# Patient Record
Sex: MALE | Race: WHITE | NOT HISPANIC OR LATINO | Employment: UNEMPLOYED | RURAL
[De-identification: names, ages, dates, MRNs, and addresses within clinical notes are randomized per-mention and may not be internally consistent; named-entity substitution may affect disease eponyms.]

---

## 2017-02-24 ENCOUNTER — TRANSCRIBE ORDERS (OUTPATIENT)
Dept: PHYSICAL THERAPY | Facility: HOSPITAL | Age: 72
End: 2017-02-24

## 2017-02-24 DIAGNOSIS — R27.0 ATAXIA: Primary | ICD-10-CM

## 2017-03-06 ENCOUNTER — HOSPITAL ENCOUNTER (OUTPATIENT)
Dept: PHYSICAL THERAPY | Facility: HOSPITAL | Age: 72
Setting detail: THERAPIES SERIES
Discharge: HOME | End: 2017-03-06

## 2017-03-06 DIAGNOSIS — R27.0 ATAXIA: Primary | ICD-10-CM

## 2017-03-06 PROCEDURE — G8978 MOBILITY CURRENT STATUS: HCPCS | Performed by: PHYSICAL THERAPIST

## 2017-03-06 PROCEDURE — 97162 PT EVAL MOD COMPLEX 30 MIN: CPT | Performed by: PHYSICAL THERAPIST

## 2017-03-06 PROCEDURE — 97110 THERAPEUTIC EXERCISES: CPT | Performed by: PHYSICAL THERAPIST

## 2017-03-06 PROCEDURE — G8979 MOBILITY GOAL STATUS: HCPCS | Performed by: PHYSICAL THERAPIST

## 2017-03-07 NOTE — PROGRESS NOTES
Outpatient Physical Therapy Ortho Initial Evaluation  [facility]  Marylou Malcolm, PT  17       Patient Name: Keshav Elias  : 1945  MRN: [id number]  Today's Date: 3/6/2017      Visit Date: 2017     Subjective Improvement: N/A       Attendance:   Approved: Medicare Guidlines MD follow up: Ask next visit          RC date: 3/27/17           There is no problem list on file for this patient.       Past Medical History   Diagnosis Date   • Benign tumor      Bottom R foot   • Carpal tunnel syndrome      Left   • Cataract    • Sleep apnea         Past Surgical History   Procedure Laterality Date   • Tonsillectomy     • Gastric bypass     • Coronary artery bypass graft     • Vena cava filter insertion     • Joint replacement       Knee   • Hernia repair     • Angioplasty     • Cardiac surgery       Stent placement x2   • Back surgery  2016     radical facetectomy L5-S1     Medications:  Famotidine tab  Ferrous Gluconate  Coumadin  Vitamin B12  Multi-vitamin  Prozac/Fluoxetine  Metoprolol/Toprol XL  Plavix  Calcium 600 +D  Crestor      Visit Dx:     ICD-10-CM ICD-9-CM   1. Ataxia R27.0 781.3             Patient History       17 1400          History    Chief Complaint Difficulty Walking;Balance Problems  -KG      Date Current Problem(s) Began --   Last spring; getting worse over time  -KG      Brief Description of Current Complaint Pt presents with c/o decrased balance. Pt states he also has back pain which he has had for many years. Pt states he gets lightheaded at times. Reports his lightheadedness comes and goes. States he has had vertigo before & this isn't it. Reports his main issue is his balance. States he walks with his hands on walls/furniture to keep balance. States he doesn't use an AD. Reports he has 3 steps to get into the house with no handrails; 11 steps leading downstairs in his home with a handrail. States he has fallen 3 times in the past  year due to his decreased balance.  -KG      Occupation/sports/leisure activities Pt retired. Enjoys fishing, woodworking/carving, painting  -KG      Pain     Pain Location Back  -KG      Pain at Present 4  -KG      Fall Risk Assessment    Any falls in the past year: Yes  -KG      Number of falls reported in the last 12 months 3  -KG      Factors that contributed to the fall: Lost balance   Dizziness  -KG        User Key  (r) = Recorded By, (t) = Taken By, (c) = Cosigned By    Initials Name Provider Type    KG Marylou Malcolm, PT Physical Therapist                PT Ortho       03/06/17 1400    Subjective Comments    Subjective Comments Pt states decreased balance is his main issue and it's not caused by dizziness/lightheadedness  -KG    Subjective Pain    Able to rate subjective pain? yes  -KG    Pre-Treatment Pain Level 4  -KG    Post-Treatment Pain Level 4  -KG    Posture/Observations    Posture- WNL Posture is WNL  -KG    Posture/Observations Comments Pt shows no signs of acute distress. Ambulates with a mild antalgic gait demonstrating decreased step length bilaterally, decreased heel strike. Pt holds onto the wall walking to the treatment room. Slow/guarded with sit<>stand transfers due to pain in low back. Decreased balance noted during turns with ambulation  -KG    Sensation    Sensation WNL? WNL  -KG    Light Touch No apparent deficits  -KG    ROM (Range of Motion)    General ROM no range of motion deficits identified  -KG    MMT (Manual Muscle Testing)    General MMT Assessment lower extremity strength deficits identified  -KG    General MMT Assessment Detail R side weaker than L, hip flex 4/8, knee flex 3+/5, ext 3+/5  -KG    Left Hip    Hip Flexion Gross Movement (4/5) good  -KG    Hip ABduction Gross Movement (4/5) good  -KG    Hip ADduction Gross Movement (4/5) good  -KG    Right Hip    Hip Flexion Gross Movement (4-/5) good minus  -KG    Hip ABduction Gross Movement (4/5) good  -KG    Hip ADduction  "Gross Movement (4/5) good  -KG    Lower Extremity    Lower Ext Manual Muscle Testing left hip strength deficit;right hip strength deficit;left knee strength deficit;right knee strength deficit;left ankle strength deficit;right ankle strength deficit  -KG    Left Knee    Knee Extension Gross Movement (3+/5) fair plus  -KG    Knee Flexion Gross Movement (3+/5) fair plus  -KG    Right Knee    Knee Extension Gross Movement (4+/5) good plus  -KG    Knee Flexion Gross Movement (4+/5) good plus  -KG    Left Ankle/Foot    Ankle PF Gross Movement (4+/5) good plus  -KG    Ankle Dorsiflexion Gross Movement (4+/5) good plus  -KG    Right Ankle/Foot    Ankle PF Gross Movement (4/5) good  -KG    Ankle Dorsiflexion Gross Movement (4/5) good  -KG    Balance Skills Training    Training Strategies (Balance Skills) EO/FA 45\", EO/FT 45\" with increased sway vs FA, Tandem R lead 17\", Tandem L lead 22\", EC/FA 45\", EC/FT 5\" & 20\"  -KG    Gait Assessment/Treatment    Gait, Comment TUG: 15.7\", 16.2\"  -KG      User Key  (r) = Recorded By, (t) = Taken By, (c) = Cosigned By    Initials Name Provider Type    RAFA Malcolm, PT Physical Therapist                            Therapy Education       03/06/17 1818          Therapy Education    Given HEP;Symptoms/condition management   HEP: EC & EO/FT, modified tandem standing balance, standing CR/TR, standing hip abd/ext  -KG      Program New  -KG      How Provided Verbal;Demonstration;Written  -KG      Provided to Patient  -KG      Level of Understanding Teach back education performed;Verbalized;Demonstrated  -KG        User Key  (r) = Recorded By, (t) = Taken By, (c) = Cosigned By    Initials Name Provider Type    RAFA Malcolm, PT Physical Therapist                PT OP Goals       03/06/17 1400       PT Short Term Goals    STG Date to Achieve 03/20/17  -KG     STG 1 Indep with HEP  -KG     STG 1 Progress New  -KG     STG 2 Perform standing balance eyes closed, feet together for 30\" or " "greater  -KG     STG 2 Progress New  -KG     STG 3 Demonstrate Tandem stance R/L leading to 30\" or greater  -KG     STG 3 Progress New  -KG     STG 4 Complete TUG test in 13 seconds or less.  -KG     STG 4 Progress New  -KG     Long Term Goals    LTG Date to Achieve 03/27/17  -KG     LTG 1 Subjectively report 60% improvement or greater  -KG     LTG 1 Progress New  -KG     LTG 2 Complete TUG test in 10 seconds or less  -KG     LTG 2 Progress New  -KG     LTG 3 Demonstrate bilateral hip flex strength to 4+/5 or greater  -KG     LTG 3 Progress New  -KG     LTG 4 Demonstrate bilateral hip abd strength to 4+/5 or greater  -KG     LTG 4 Progress New  -KG     LTG 5 Demonstrate R knee flex/ext to 4+/5 or greater  -KG     LTG 5 Progress New  -KG     LTG 6 Perform standing balance eyes closed, feet together for 45\" or greater  -KG     LTG 6 Progress New  -KG     LTG 7 Ascend/descend 4 steps or greater with reciprocal patter, no compensaton, no LOB  -KG     LTG 7 Progress New  -KG     Time Calculation    PT Goal Re-Cert Due Date 03/27/17   Visit 1/1, MD: ask next visit, Improvement N/A  -KG       User Key  (r) = Recorded By, (t) = Taken By, (c) = Cosigned By    Initials Name Provider Type    KG Marylou Malcolm, PT Physical Therapist                PT Assessment/Plan       03/06/17 1400       PT Assessment    Functional Limitations Decreased safety during functional activities;Impaired locomotion;Limitation in home management;Limitations in community activities;Limitations in functional capacity and performance;Performance in self-care ADL;Performance in leisure activities  -KG     Impairments Balance;Endurance;Locomotion;Motor function;Muscle strength;Pain  -KG     Assessment Comments Pt presents with decreased static/dynamic balance, decreased lower extremity hip strength/stability, & decreased core stability. Pt will benefit from skilled PT to improve overall balance & functional mobility  -KG     Rehab Potential Good  -KG  "    Patient/caregiver participated in establishment of treatment plan and goals Yes  -KG     Patient would benefit from skilled therapy intervention Yes  -KG     PT Plan    PT Frequency 2x/week  -KG     Predicted Duration of Therapy Intervention (days/wks) 4 weeks  -KG     Physical Therapy Interventions (Optional Details) balance training;gait training;home exercise program;lumbar stabilization;modalities;manual therapy techniques;neuromuscular re-education;stair training;strengthening;stretching  -KG     PT Plan Comments HEP, balance activities, CKC hip/knee stability, core stablization, modalities PRN for pain, gait training  -KG       User Key  (r) = Recorded By, (t) = Taken By, (c) = Cosigned By    Initials Name Provider Type    KG Marylou Malcolm, PT Physical Therapist                  Exercises       03/06/17 1400          Subjective Comments    Subjective Comments Pt states decreased balance is his main issue and it's not caused by dizziness/lightheadedness  -KG      Subjective Pain    Able to rate subjective pain? yes  -KG      Pre-Treatment Pain Level 4  -KG      Post-Treatment Pain Level 4  -KG      Aquatics    Aquatics performed? No  -KG      Exercise 1    Exercise Name 1 Standing CR/TR  -KG      Sets 1 2  -KG      Reps 1 10  -KG      Exercise 2    Exercise Name 2 Standing Hip abd/ext eladia  -KG      Sets 2 2  -KG      Reps 2 10  -KG      Exercise 3    Exercise Name 3 Standing balance: EC/FA  -KG      Reps 3 2  -KG      Time (Seconds) 3 30  -KG        User Key  (r) = Recorded By, (t) = Taken By, (c) = Cosigned By    Initials Name Provider Type    KG Marylou Malcolm, PT Physical Therapist                              Outcome Measures       03/06/17 1400          Timed Up and Go (TUG)    TUG Test 1 15.7 seconds  -KG      TUG Test 2 16.2 seconds  -KG      Functional Assessment    Outcome Measure Options Timed Up and Go (TUG)  -KG        User Key  (r) = Recorded By, (t) = Taken By, (c) = Cosigned By     Initials Name Provider Type    KG Marylou Malcolm, PT Physical Therapist            Time Calculation:   Start Time: 1402  Stop Time: 1438  Time Calculation (min): 36 min  Total Timed Code Minutes- PT: 20 minute(s)     Therapy Charges for Today     Code Description Service Date Service Provider Modifiers Qty    65292867174 HC PT MOBILITY CURRENT 3/6/2017 Marylou Malcolm, PT GP, CJ 1    53791839553 HC PT MOBILITY PROJECTED 3/6/2017 Marylou Malcolm, PT GP, CI 1    74802985814 HC PT EVAL MOD COMPLEXITY 1 3/6/2017 Marylou Malcolm, PT GP 1    91040159630 HC PT THER PROC EA 15 MIN 3/6/2017 Marylou Malcolm, PT GP 1          PT G-Codes  PT Professional Judgement Used?: Yes  Outcome Measure Options: Timed Up and Go (TUG)  Score: 15.7  Functional Limitation: Mobility: Walking and moving around  Mobility: Walking and Moving Around Current Status (): At least 20 percent but less than 40 percent impaired, limited or restricted  Mobility: Walking and Moving Around Goal Status (): At least 1 percent but less than 20 percent impaired, limited or restricted         Marylou Malcolm, PT  3/6/2017

## 2017-03-14 ENCOUNTER — HOSPITAL ENCOUNTER (OUTPATIENT)
Dept: PHYSICAL THERAPY | Facility: HOSPITAL | Age: 72
Setting detail: THERAPIES SERIES
Discharge: HOME | End: 2017-03-14

## 2017-03-14 DIAGNOSIS — R27.0 ATAXIA: Primary | ICD-10-CM

## 2017-03-14 PROCEDURE — 97110 THERAPEUTIC EXERCISES: CPT

## 2017-03-14 NOTE — PROGRESS NOTES
"    Outpatient Physical Therapy Ortho Treatment Note  [facility]  Macy Kendrick PTA  17  11:20 AM       Patient Name: Keshav lEias  : 1945  MRN: [id number]  Today's Date: 3/14/2017      Visit Date: 2017  Subjective Improvement:  \"none\"     Attendance:   Approved:    Medicare guidelines       MD follow up:         prn  RC date:  3/27/17       Visit Dx:    ICD-10-CM ICD-9-CM   1. Ataxia R27.0 781.3       There is no problem list on file for this patient.       Past Medical History   Diagnosis Date   • Benign tumor      Bottom R foot   • Carpal tunnel syndrome      Left   • Cataract    • Sleep apnea         Past Surgical History   Procedure Laterality Date   • Tonsillectomy     • Gastric bypass     • Coronary artery bypass graft     • Vena cava filter insertion     • Joint replacement       Knee   • Hernia repair     • Angioplasty     • Cardiac surgery       Stent placement x2   • Back surgery  2016     radical facetectomy L5-S1             PT Ortho       17 1000    Subjective Comments    Subjective Comments \"No particular dizziness this a.m. but no big change.\"  -PM    Subjective Pain    Able to rate subjective pain? yes  -PM    Pre-Treatment Pain Level 4  -PM    Post-Treatment Pain Level 5  -PM      User Key  (r) = Recorded By, (t) = Taken By, (c) = Cosigned By    Initials Name Provider Type    PM Macy Kendrick PTA Physical Therapy Assistant                            PT Assessment/Plan       17 1000       PT Assessment    Functional Limitations Decreased safety during functional activities;Impaired locomotion;Limitation in home management;Limitations in community activities;Limitations in functional capacity and performance;Performance in self-care ADL;Performance in leisure activities  -PM     Impairments Balance;Endurance;Locomotion;Motor function;Muscle strength;Pain  -PM     Assessment Comments Pt did very well with static balance exc " "although works him; he was quite challenged with dynamic balance activity; might benefit from use of cane.  -PM     Rehab Potential Good  -PM     Patient/caregiver participated in establishment of treatment plan and goals Yes  -PM     Patient would benefit from skilled therapy intervention Yes  -PM     PT Plan    PT Frequency 2x/week  -PM     Predicted Duration of Therapy Intervention (days/wks) 4 weeks  -PM     PT Plan Comments AirEx march/CR/TR/MS  -PM       User Key  (r) = Recorded By, (t) = Taken By, (c) = Cosigned By    Initials Name Provider Type    PM Macy Kendrick PTA Physical Therapy Assistant                    Exercises       03/14/17 1000          Subjective Comments    Subjective Comments \"No particular dizziness this a.m. but no big change.\"  -PM      Subjective Pain    Able to rate subjective pain? yes  -PM      Pre-Treatment Pain Level 4  -PM      Post-Treatment Pain Level 5  -PM      Aquatics    Aquatics performed? No  -PM      Exercise 1    Exercise Name 1 Standing CR/TR  -PM      Sets 1 2  -PM      Reps 1 10  -PM      Exercise 2    Exercise Name 2 Standing Hip abd/ext eladia with TA  -PM      Sets 2 2  -PM      Reps 2 10  -PM      Exercise 3    Exercise Name 3 Standing balance: EC/FA;FT  -PM      Reps 3 3  -PM      Time (Seconds) 3 30  -PM      Exercise 4    Exercise Name 4 Tandem stance R/L leads  -PM      Cueing 4 Verbal;Tactile  -PM      Reps 4 2  -PM      Time (Seconds) 4 30  -PM      Exercise 5    Exercise Name 5 standing march with TA  -PM      Cueing 5 Verbal  -PM      Sets 5 2  -PM      Reps 5 10  -PM      Exercise 6    Exercise Name 6 sidestepping  -PM      Cueing 6 Tactile  -PM      Equipment 6 --   handrail  -PM      Reps 6 3  -PM      Exercise 7    Exercise Name 7 F/B gait at HR  -PM      Cueing 7 Tactile  -PM      Reps 7 2  -PM      Exercise 8    Exercise Name 8 gait train   intermittent unsteadiness; decr heelstrike; step ht; decr UE  -PM      Cueing 8 Verbal  -PM      Time " "(Minutes) 8 --   hallway 1 trip  -PM        User Key  (r) = Recorded By, (t) = Taken By, (c) = Cosigned By    Initials Name Provider Type    PM Macy Kendrick PTA Physical Therapy Assistant                               PT OP Goals       03/14/17 1000       PT Short Term Goals    STG Date to Achieve 03/20/17  -PM     STG 1 Indep with HEP  -PM     STG 1 Progress Progressing  -PM     STG 2 Perform standing balance eyes closed, feet together for 30\" or greater  -PM     STG 2 Progress Progressing  -PM     STG 3 Demonstrate Tandem stance R/L leading to 30\" or greater  -PM     STG 3 Progress Progressing   had to touch 1 x; mild sway  -PM     STG 4 Complete TUG test in 13 seconds or less.  -PM     STG 4 Progress New  -PM     Long Term Goals    LTG Date to Achieve 03/27/17  -PM     LTG 1 Subjectively report 60% improvement or greater  -PM     LTG 1 Progress New  -PM     LTG 2 Complete TUG test in 10 seconds or less  -PM     LTG 2 Progress New  -PM     LTG 3 Demonstrate bilateral hip flex strength to 4+/5 or greater  -PM     LTG 3 Progress New  -PM     LTG 4 Demonstrate bilateral hip abd strength to 4+/5 or greater  -PM     LTG 4 Progress New  -PM     LTG 5 Demonstrate R knee flex/ext to 4+/5 or greater  -PM     LTG 5 Progress New  -PM     LTG 6 Perform standing balance eyes closed, feet together for 45\" or greater  -PM     LTG 6 Progress New  -PM     LTG 7 Ascend/descend 4 steps or greater with reciprocal patter, no compensaton, no LOB  -PM     LTG 7 Progress New  -PM     Time Calculation    PT Goal Re-Cert Due Date 03/27/17 2/2; no impr yet; prn;  guidelines  -PM       User Key  (r) = Recorded By, (t) = Taken By, (c) = Cosigned By    Initials Name Provider Type    PM Macy Kendrick PTA Physical Therapy Assistant                Therapy Education       03/14/17 1000          Therapy Education    Given HEP;Symptoms/condition management   HEP: EC & EO/FT, modified tandem standing balance, standing CR/TR, standing " hip abd/ext  -PM      Program Reinforced  -PM      How Provided Verbal;Demonstration;Written  -PM      Provided to Patient  -PM      Level of Understanding Teach back education performed;Verbalized;Demonstrated  -PM        User Key  (r) = Recorded By, (t) = Taken By, (c) = Cosigned By    Initials Name Provider Type    PM Macy Kendrick PTA Physical Therapy Assistant                Time Calculation:   Start Time: 1018  Stop Time: 1100  Time Calculation (min): 42 min  Total Timed Code Minutes- PT: 42 minute(s)    Therapy Charges for Today     Code Description Service Date Service Provider Modifiers Qty    26296912964 HC PT THER PROC EA 15 MIN 3/14/2017 Macy Kendrick PTA GP 3                    Macy Kendrick PTA  3/14/2017

## 2017-03-16 ENCOUNTER — HOSPITAL ENCOUNTER (OUTPATIENT)
Dept: PHYSICAL THERAPY | Facility: HOSPITAL | Age: 72
Setting detail: THERAPIES SERIES
Discharge: HOME | End: 2017-03-16

## 2017-03-16 DIAGNOSIS — R27.0 ATAXIA: Primary | ICD-10-CM

## 2017-03-16 PROCEDURE — 97110 THERAPEUTIC EXERCISES: CPT

## 2017-03-21 ENCOUNTER — APPOINTMENT (OUTPATIENT)
Dept: PHYSICAL THERAPY | Facility: HOSPITAL | Age: 72
End: 2017-03-21

## 2017-03-23 ENCOUNTER — HOSPITAL ENCOUNTER (OUTPATIENT)
Dept: PHYSICAL THERAPY | Facility: HOSPITAL | Age: 72
Setting detail: THERAPIES SERIES
Discharge: HOME | End: 2017-03-23

## 2017-03-23 DIAGNOSIS — R27.0 ATAXIA: Primary | ICD-10-CM

## 2017-03-23 PROCEDURE — 97110 THERAPEUTIC EXERCISES: CPT | Performed by: PHYSICAL THERAPIST

## 2017-03-23 NOTE — PROGRESS NOTES
Outpatient Physical Therapy Ortho Treatment Note  [facility]  Marylou Malcolm, PT  17       Patient Name: Keshav Elias  : 1945  MRN: [id number]  Today's Date: 3/23/2017      Visit Date: 2017     Subjective Improvement: 0%       Attendance:   Approved: Medicare guidelines          MD follow up:  TBD          date:  3/27/17         Visit Dx:    ICD-10-CM ICD-9-CM   1. Ataxia R27.0 781.3       There is no problem list on file for this patient.       Past Medical History:   Diagnosis Date   • Benign tumor     Bottom R foot   • Carpal tunnel syndrome     Left   • Cataract    • Sleep apnea         Past Surgical History:   Procedure Laterality Date   • ANGIOPLASTY     • BACK SURGERY  2016    radical facetectomy L5-S1   • CARDIAC SURGERY      Stent placement x2   • CORONARY ARTERY BYPASS GRAFT     • GASTRIC BYPASS     • HERNIA REPAIR     • JOINT REPLACEMENT      Knee   • TONSILLECTOMY     • VENA CAVA FILTER INSERTION               PT Ortho       17 1000    Subjective Comments    Subjective Comments Pt states he's having some back pain today. States he continues to have issues with his balance & is unable to tell much of a difference  -KG    Subjective Pain    Pre-Treatment Pain Level 3  -KG    Post-Treatment Pain Level 2  -KG    Subjective Pain Comment Lower back pain  -KG      User Key  (r) = Recorded By, (t) = Taken By, (c) = Cosigned By    Initials Name Provider Type    RAFA Malcolm, PT Physical Therapist                            PT Assessment/Plan       17 1000       PT Assessment    Functional Limitations Decreased safety during functional activities;Impaired locomotion;Limitation in home management;Limitations in community activities;Limitations in functional capacity and performance;Performance in self-care ADL;Performance in leisure activities  -KG     Impairments Balance;Endurance;Locomotion;Motor function;Muscle strength;Pain  -KG      "Assessment Comments Pt challenged with airex sidestepping without UE support but no LOB. Overall more challenged with dynamic balance activities this date.  -KG     Rehab Potential Good  -KG     Patient/caregiver participated in establishment of treatment plan and goals Yes  -KG     Patient would benefit from skilled therapy intervention Yes  -KG     PT Plan    PT Frequency 2x/week  -KG     Predicted Duration of Therapy Intervention (days/wks) 4 weeks  -KG     PT Plan Comments  Recheck next week; cont balance activities, hip/knee stability, core stability  -KG       User Key  (r) = Recorded By, (t) = Taken By, (c) = Cosigned By    Initials Name Provider Type    KG Marylou Malcolm, PT Physical Therapist                    Exercises       03/23/17 1000          Subjective Comments    Subjective Comments Pt states he's having some back pain today. States he continues to have issues with his balance & is unable to tell much of a difference  -KG      Subjective Pain    Able to rate subjective pain? yes  -KG      Pre-Treatment Pain Level 3  -KG      Post-Treatment Pain Level 2  -KG      Subjective Pain Comment Lower back pain  -KG      Aquatics    Aquatics performed? No  -KG      Exercise 1    Exercise Name 1 PRO II for ROM/end  -KG      Resistance 1 --   L3.0  -KG      Time (Minutes) 1 10 min  -KG      Exercise 2    Exercise Name 2 Airex: CR/TR  -KG      Sets 2 2  -KG      Reps 2 10  -KG      Exercise 3    Exercise Name 3 Airex: March  -KG      Sets 3 2  -KG      Reps 3 10  -KG      Exercise 4    Exercise Name 4 Hooklying clamshells  -KG      Equipment 4 Theraband  -KG      Resistance 4 Red  -KG      Sets 4 2  -KG      Reps 4 10  -KG      Exercise 5    Exercise Name 5 Sit<>stands   Used BUE first set; No BUE support second set  -KG      Sets 5 2  -KG      Reps 5 10  -KG      Exercise 6    Exercise Name 6 Fwd Step-Ups 4\"  -KG      Sets 6 2  -KG      Reps 6 10  -KG      Exercise 7    Exercise Name 7 Airexbeam: Lat " "Sidestepping  -KG      Sets 7 1  -KG      Reps 7 5  -KG      Exercise 8    Exercise Name 8 Airexbeam: Tandem Walk  -KG      Sets 8 1  -KG      Reps 8 5  -KG      Exercise 9    Exercise Name 9 Fwd/Bwd Gait at HR  -KG      Sets 9 1  -KG      Reps 9 5  -KG        User Key  (r) = Recorded By, (t) = Taken By, (c) = Cosigned By    Initials Name Provider Type    RAFA Malcolm, PT Physical Therapist                               PT OP Goals       03/23/17 1000       PT Short Term Goals    STG Date to Achieve 03/20/17  -KG     STG 1 Indep with HEP  -KG     STG 1 Progress Progressing  -KG     STG 2 Perform standing balance eyes closed, feet together for 30\" or greater  -KG     STG 2 Progress Met  -KG     STG 3 Demonstrate Tandem stance R/L leading to 30\" or greater  -KG     STG 3 Progress Met  -KG     STG 4 Complete TUG test in 13 seconds or less.  -KG     STG 4 Progress Progressing  -KG     Long Term Goals    LTG Date to Achieve 03/27/17  -KG     LTG 1 Subjectively report 60% improvement or greater  -KG     LTG 1 Progress Progressing  -KG     LTG 2 Complete TUG test in 10 seconds or less  -KG     LTG 2 Progress Progressing  -KG     LTG 3 Demonstrate bilateral hip flex strength to 4+/5 or greater  -KG     LTG 3 Progress Progressing  -KG     LTG 4 Demonstrate bilateral hip abd strength to 4+/5 or greater  -KG     LTG 4 Progress Progressing  -KG     LTG 5 Demonstrate R knee flex/ext to 4+/5 or greater  -KG     LTG 5 Progress Progressing  -KG     LTG 6 Perform standing balance eyes closed, feet together for 45\" or greater  -KG     LTG 6 Progress Progressing  -KG     LTG 7 Ascend/descend 4 steps or greater with reciprocal patter, no compensaton, no LOB  -KG     LTG 7 Progress Progressing  -KG     Time Calculation    PT Goal Re-Cert Due Date 03/27/17   Visit 4/4, MD MONTES, 0% improvement  -KG       User Key  (r) = Recorded By, (t) = Taken By, (c) = Cosigned By    Initials Name Provider Type    RAFA Malcolm PT Physical " Therapist                Therapy Education       03/23/17 1000          Therapy Education    Given HEP;Symptoms/condition management  -KG      Program Reinforced  -KG      How Provided Verbal  -KG      Provided to Patient  -KG      Level of Understanding Verbalized;Demonstrated  -KG        User Key  (r) = Recorded By, (t) = Taken By, (c) = Cosigned By    Initials Name Provider Type    KG Marylou Malcolm, PT Physical Therapist                Time Calculation:   Start Time: 1013  Stop Time: 1059  Time Calculation (min): 46 min  Total Timed Code Minutes- PT: 46 minute(s)    Therapy Charges for Today     Code Description Service Date Service Provider Modifiers Qty    14769897222  PT THER PROC EA 15 MIN 3/23/2017 Marylou Malcolm, PT GP 3                    Marylou Malcolm, PT  3/23/2017

## 2017-03-28 ENCOUNTER — HOSPITAL ENCOUNTER (OUTPATIENT)
Dept: PHYSICAL THERAPY | Facility: HOSPITAL | Age: 72
Setting detail: THERAPIES SERIES
Discharge: HOME | End: 2017-03-28

## 2017-03-28 DIAGNOSIS — R27.0 ATAXIA: Primary | ICD-10-CM

## 2017-03-28 PROCEDURE — 97110 THERAPEUTIC EXERCISES: CPT

## 2017-03-28 NOTE — PROGRESS NOTES
Outpatient Physical Therapy Ortho Treatment Note  [facility]  Abebe Alberts PTA  17  11:38 AM       Patient Name: Keshav Elias  : 1945  MRN: [id number]  Today's Date: 3/28/2017      Visit Date: 2017     Subjective Improvement: 0%      Attendance:    Approved: Medicare Guidelines          MD follow up: PRN          RC date: 3/27/17           Visit Dx:    ICD-10-CM ICD-9-CM   1. Ataxia R27.0 781.3       There is no problem list on file for this patient.       Past Medical History:   Diagnosis Date   • Benign tumor     Bottom R foot   • Carpal tunnel syndrome     Left   • Cataract    • Sleep apnea         Past Surgical History:   Procedure Laterality Date   • ANGIOPLASTY     • BACK SURGERY  2016    radical facetectomy L5-S1   • CARDIAC SURGERY      Stent placement x2   • CORONARY ARTERY BYPASS GRAFT     • GASTRIC BYPASS     • HERNIA REPAIR     • JOINT REPLACEMENT      Knee   • TONSILLECTOMY     • VENA CAVA FILTER INSERTION               PT Ortho       17 1000    Subjective Comments    Subjective Comments Pt reports he is having a pain in his low back today.   -MB    Subjective Pain    Able to rate subjective pain? yes  -MB    Pre-Treatment Pain Level 6  -MB    Subjective Pain Comment Low Back  -MB      User Key  (r) = Recorded By, (t) = Taken By, (c) = Cosigned By    Initials Name Provider Type    ENRIKE Alberts PTA Physical Therapy Assistant                            PT Assessment/Plan       17 1000       PT Assessment    Functional Limitations Decreased safety during functional activities;Impaired locomotion;Limitation in home management;Limitations in community activities;Limitations in functional capacity and performance;Performance in self-care ADL;Performance in leisure activities  -MB     Impairments Balance;Endurance;Locomotion;Motor function;Muscle strength;Pain  -MB     Assessment Comments Pt displays good tandum balance today, with  "no sway noted. Pt still has a little trouble with Lat airex beam stepping, and has one minor LOB, but is able to self correct.  -MB     Rehab Potential Good  -MB     Patient/caregiver participated in establishment of treatment plan and goals Yes  -MB     Patient would benefit from skilled therapy intervention Yes  -MB     PT Plan    PT Frequency 2x/week  -MB     Predicted Duration of Therapy Intervention (days/wks) 4 weeks  -MB     PT Plan Comments Recheck next visit.  -MB       User Key  (r) = Recorded By, (t) = Taken By, (c) = Cosigned By    Initials Name Provider Type    ENRIKE Alberts, PTA Physical Therapy Assistant                    Exercises       03/28/17 1000          Subjective Comments    Subjective Comments Pt reports he is having a pain in his low back today.   -MB      Subjective Pain    Able to rate subjective pain? yes  -MB      Pre-Treatment Pain Level 6  -MB      Subjective Pain Comment Low Back  -MB      Aquatics    Aquatics performed? No  -MB      Exercise 1    Exercise Name 1 PRO II for ROM/end  -MB      Resistance 1 --   3.0  -MB      Time (Minutes) 1 10  -MB      Exercise 2    Exercise Name 2 Step ups Fwd/Lat  -MB      Equipment 2 --   6\" step  -MB      Sets 2 2  -MB      Reps 2 10  -MB      Exercise 3    Exercise Name 3 Airex Marching  -MB      Sets 3 2  -MB      Reps 3 10  -MB      Exercise 4    Exercise Name 4 Airex CR/TR  -MB      Sets 4 2  -MB      Reps 4 10  -MB      Exercise 5    Exercise Name 5 Airex Minisquats  -MB      Sets 5 2  -MB      Reps 5 10  -MB      Exercise 6    Exercise Name 6 Airex beam Lat stepping  -MB      Sets 6 1  -MB      Reps 6 5  -MB      Exercise 7    Exercise Name 7 Airex beam Heel/toe forward  -MB      Sets 7 1  -MB      Reps 7 5  -MB      Exercise 8    Exercise Name 8 Airex FT EO  -MB      Sets 8 2  -MB      Time (Seconds) 8 30  -MB      Exercise 9    Exercise Name 9 Airex FT EC  -MB      Sets 9 2  -MB      Time (Seconds) 9 30  -MB      Exercise 10    " "Exercise Name 10 Tandum R/L  -MB      Sets 10 1  -MB      Time (Seconds) 10 30 sec each  -MB        User Key  (r) = Recorded By, (t) = Taken By, (c) = Cosigned By    Initials Name Provider Type    ENRIKE Alberts PTA Physical Therapy Assistant                               PT OP Goals       03/28/17 1000       PT Short Term Goals    STG Date to Achieve 03/20/17  -MB     STG 1 Indep with HEP  -MB     STG 1 Progress Progressing  -MB     STG 2 Perform standing balance eyes closed, feet together for 30\" or greater  -MB     STG 2 Progress Met  -MB     STG 3 Demonstrate Tandem stance R/L leading to 30\" or greater  -MB     STG 3 Progress Met  -MB     STG 4 Complete TUG test in 13 seconds or less.  -MB     STG 4 Progress Progressing  -MB     Long Term Goals    LTG Date to Achieve 03/27/17  -MB     LTG 1 Subjectively report 60% improvement or greater  -MB     LTG 1 Progress Progressing  -MB     LTG 2 Complete TUG test in 10 seconds or less  -MB     LTG 2 Progress Progressing  -MB     LTG 3 Demonstrate bilateral hip flex strength to 4+/5 or greater  -MB     LTG 3 Progress Progressing  -MB     LTG 4 Demonstrate bilateral hip abd strength to 4+/5 or greater  -MB     LTG 4 Progress Progressing  -MB     LTG 5 Demonstrate R knee flex/ext to 4+/5 or greater  -MB     LTG 5 Progress Progressing  -MB     LTG 6 Perform standing balance eyes closed, feet together for 45\" or greater  -MB     LTG 6 Progress Progressing  -MB     LTG 7 Ascend/descend 4 steps or greater with reciprocal patter, no compensaton, no LOB  -MB     LTG 7 Progress Progressing  -MB     Time Calculation    PT Goal Re-Cert Due Date 03/27/17   5/5, MD PRN, Improvement 0%  -MB       User Key  (r) = Recorded By, (t) = Taken By, (c) = Cosigned By    Initials Name Provider Type    ENRIKE Alberts PTA Physical Therapy Assistant                Therapy Education       03/28/17 1000          Therapy Education    Given HEP;Symptoms/condition management  -MB      " Program Reinforced  -MB      How Provided Verbal  -MB      Provided to Patient  -MB      Level of Understanding Verbalized;Demonstrated  -MB        User Key  (r) = Recorded By, (t) = Taken By, (c) = Cosigned By    Initials Name Provider Type    ENRIKE Alberts PTA Physical Therapy Assistant                Time Calculation:   Start Time: 1013  Stop Time: 1057  Time Calculation (min): 44 min  Total Timed Code Minutes- PT: 44 minute(s)    Therapy Charges for Today     Code Description Service Date Service Provider Modifiers Qty    43912269770 HC PT THER PROC EA 15 MIN 3/28/2017 Abebe Alberts PTA GP 3                    Abebe Alberts PTA  3/28/2017

## 2017-03-30 ENCOUNTER — HOSPITAL ENCOUNTER (OUTPATIENT)
Dept: PHYSICAL THERAPY | Facility: HOSPITAL | Age: 72
Setting detail: THERAPIES SERIES
Discharge: HOME | End: 2017-03-30

## 2017-03-30 DIAGNOSIS — R27.0 ATAXIA: Primary | ICD-10-CM

## 2017-03-30 PROCEDURE — G8980 MOBILITY D/C STATUS: HCPCS

## 2017-03-30 PROCEDURE — G8979 MOBILITY GOAL STATUS: HCPCS

## 2017-03-30 PROCEDURE — 97110 THERAPEUTIC EXERCISES: CPT

## 2017-03-30 NOTE — THERAPY DISCHARGE NOTE
"     Outpatient Physical Therapy Ortho Treatment Note/Discharge Summary  [facility]  Abebe CLEMENTS Jonny, PT  17  11:17 AM       Patient Name: Keshav Elias  : 1945  MRN: [id number]  Today's Date: 3/30/2017      Visit Date: 2017     Subjective Improvement: \"some\"      Attendance:   Approved: Medicare Cap           MD follow up: PRN           RC date: 17           Visit Dx:    ICD-10-CM ICD-9-CM   1. Ataxia R27.0 781.3       There is no problem list on file for this patient.       Past Medical History:   Diagnosis Date   • Benign tumor     Bottom R foot   • Carpal tunnel syndrome     Left   • Cataract    • Sleep apnea         Past Surgical History:   Procedure Laterality Date   • ANGIOPLASTY     • BACK SURGERY      radical facetectomy L5-S1   • CARDIAC SURGERY      Stent placement x2   • CORONARY ARTERY BYPASS GRAFT     • GASTRIC BYPASS     • HERNIA REPAIR     • JOINT REPLACEMENT      Knee   • TONSILLECTOMY     • VENA CAVA FILTER INSERTION               PT Ortho       17 0900    Left Hip    Hip Flexion Gross Movement (4+/5) good plus  -MD    Hip ABduction Gross Movement (4+/5) good plus  -MD    Right Hip    Hip Flexion Gross Movement (4+/5) good plus  -MD    Hip ABduction Gross Movement (4+/5) good plus  -MD    Left Knee    Knee Extension Gross Movement (4+/5) good plus  -MD    Knee Flexion Gross Movement (4+/5) good plus  -MD    Right Knee    Knee Extension Gross Movement (4+/5) good plus  -MD    Knee Flexion Gross Movement (4+/5) good plus  -MD    Left Ankle/Foot    Ankle PF Gross Movement (4+/5) good plus  -MD    Ankle Dorsiflexion Gross Movement (4+/5) good plus  -MD    Right Ankle/Foot    Ankle PF Gross Movement (4+/5) good plus  -MD    Ankle Dorsiflexion Gross Movement (4+/5) good plus  -MD    Balance Skills Training    Training Strategies (Balance Skills) Tandem B 1 min, EC/ft apart 1 min, EC/ft tog 1 min  -MD      17 1000    Subjective " Comments    Subjective Comments Pt reports he is having a pain in his low back today.   -MB    Subjective Pain    Able to rate subjective pain? yes  -MB    Pre-Treatment Pain Level 6  -MB    Subjective Pain Comment Low Back  -MB      User Key  (r) = Recorded By, (t) = Taken By, (c) = Cosigned By    Initials Name Provider Type    ENRIKE Alberts, PTA Physical Therapy Assistant    MD Abebe Fuller, PT Physical Therapist                            PT Assessment/Plan       03/30/17 1000       PT Assessment    Functional Limitations Decreased safety during functional activities;Impaired gait;Limitation in home management;Limitations in community activities;Performance in leisure activities;Limitations in functional capacity and performance;Performance in self-care ADL  -MD     Impairments Balance;Endurance;Gait;Range of motion;Pain;Muscle strength  -MD     Assessment Comments Pt I with HEP at this time, no complaints on this date.   -MD     Rehab Potential Good  -MD     Patient/caregiver participated in establishment of treatment plan and goals Yes  -MD     Patient would benefit from skilled therapy intervention No  -MD     PT Plan    Predicted Duration of Therapy Intervention (days/wks) DC to I management.  -MD     PT Plan Comments DC at this time per patient request.   -MD       User Key  (r) = Recorded By, (t) = Taken By, (c) = Cosigned By    Initials Name Provider Type    MD Abebe Fuller, PT Physical Therapist                    Exercises       03/30/17 0900          Subjective Comments    Subjective Comments Reports that his back is hurting and is always hurting. Reports that he thinks that he can do the therapy at home at this time.   -MD      Subjective Pain    Able to rate subjective pain? yes  -MD      Pre-Treatment Pain Level 6  -MD      Post-Treatment Pain Level 6  -MD      Subjective Pain Comment Low back pain  -MD      Aquatics    Aquatics performed? No  -MD      Exercise 1    Exercise Name 1 PRO  "II for ROM/Endurance  -MD      Resistance 1 --   L3.5  -MD      Time (Minutes) 1 10  -MD      Exercise 2    Exercise Name 2 Step Ups FWD/LAT 6 in  -MD      Sets 2 2  -MD      Reps 2 10  -MD      Exercise 3    Exercise Name 3 Airex CR/TR  -MD      Sets 3 2  -MD      Reps 3 15  -MD      Exercise 4    Exercise Name 4 Airex MS  -MD      Sets 4 2  -MD      Reps 4 10  -MD      Exercise 5    Exercise Name 5 CC: Resisted Gt/4 way  -MD      Weights/Plates 5 3 Plates  -MD      Reps 5 5  -MD      Exercise 6    Exercise Name 6 Tandem Stance  -MD      Reps 6 --   3 trials Each Leg Leading  -MD      Exercise 7    Exercise Name 7 Ft Apart/EC  -MD      Reps 7 --   3 trials  -MD      Exercise 8    Exercise Name 8 Ft tog/EC  -MD      Reps 8 --   3 trials  -MD        User Key  (r) = Recorded By, (t) = Taken By, (c) = Cosigned By    Initials Name Provider Type    MD Abebe Fuller, PT Physical Therapist                               PT OP Goals       03/30/17 0900       PT Short Term Goals    STG Date to Achieve 03/20/17  -MD     STG 1 Indep with HEP  -MD     STG 1 Progress Met  -MD     STG 2 Perform standing balance eyes closed, feet together for 30\" or greater  -MD     STG 2 Progress Met  -MD     STG 3 Demonstrate Tandem stance R/L leading to 30\" or greater  -MD     STG 3 Progress Met  -MD     STG 4 Complete TUG test in 13 seconds or less.  -MD     STG 4 Progress Not Met  -MD     Long Term Goals    LTG Date to Achieve 03/27/17  -MD     LTG 1 Subjectively report 60% improvement or greater  -MD     LTG 1 Progress Not Met  -MD     LTG 2 Complete TUG test in 10 seconds or less  -MD     LTG 2 Progress Not Met  -MD     LTG 3 Demonstrate bilateral hip flex strength to 4+/5 or greater  -MD     LTG 3 Progress Met  -MD     LTG 4 Demonstrate bilateral hip abd strength to 4+/5 or greater  -MD     LTG 4 Progress Met  -MD     LTG 5 Demonstrate R knee flex/ext to 4+/5 or greater  -MD     LTG 5 Progress Met  -MD     LTG 6 Perform standing balance " "eyes closed, feet together for 45\" or greater  -MD     LTG 6 Progress Met  -MD     LTG 7 Ascend/descend 4 steps or greater with reciprocal patter, no compensaton, no LOB  -MD     LTG 7 Progress Not Met  -MD     Time Calculation    PT Goal Re-Cert Due Date 04/20/17   Visits 6/6, MD COOKN, \"some\"  -MD       User Key  (r) = Recorded By, (t) = Taken By, (c) = Cosigned By    Initials Name Provider Type    MD Abebe Fuller, PT Physical Therapist                Therapy Education       03/30/17 1000          Therapy Education    Given HEP;Symptoms/condition management;Pain management;Posture/body mechanics  -MD      Program Reinforced  -MD      How Provided Verbal;Demonstration  -MD      Provided to Patient  -MD      Level of Understanding Teach back education performed;Verbalized;Demonstrated  -MD        User Key  (r) = Recorded By, (t) = Taken By, (c) = Cosigned By    Initials Name Provider Type    MD Abebe Fuller, PT Physical Therapist                Time Calculation:   Start Time: 0930  Stop Time: 1013  Time Calculation (min): 43 min    Therapy Charges for Today     Code Description Service Date Service Provider Modifiers Qty    72882806395 HC PT MOBILITY PROJECTED 3/30/2017 Abebe Fuller, PT GP, CI 1    59611992938 HC PT MOBILITY DISCHARGE 3/30/2017 Abebe Fuller, PT GP, CI 1    24520903237 HC PT THER PROC EA 15 MIN 3/30/2017 Abebe Fuller, PT GP 3          PT G-Codes  Functional Limitation: Mobility: Walking and moving around  Mobility: Walking and Moving Around Goal Status (): At least 1 percent but less than 20 percent impaired, limited or restricted  Mobility: Walking and Moving Around Discharge Status (): At least 1 percent but less than 20 percent impaired, limited or restricted     OP PT Discharge Summary  Date of Discharge: 03/30/17  Reason for Discharge: Maximum functional potential achieved  Outcomes Achieved: Patient able to partially acheive established goals  Discharge Destination: " Home with home program  Discharge Instructions: Patient ready to be DC'ed today, believes he can work on the exercises at home on his own.       Abebe Fuller, PT  3/30/2017

## 2017-04-04 ENCOUNTER — APPOINTMENT (OUTPATIENT)
Dept: PHYSICAL THERAPY | Facility: HOSPITAL | Age: 72
End: 2017-04-04

## 2017-04-06 ENCOUNTER — APPOINTMENT (OUTPATIENT)
Dept: PHYSICAL THERAPY | Facility: HOSPITAL | Age: 72
End: 2017-04-06

## 2017-12-11 ENCOUNTER — OFFICE VISIT (OUTPATIENT)
Dept: NEUROLOGY | Age: 72
End: 2017-12-11
Payer: MEDICARE

## 2017-12-11 VITALS
WEIGHT: 276 LBS | SYSTOLIC BLOOD PRESSURE: 116 MMHG | HEIGHT: 68 IN | DIASTOLIC BLOOD PRESSURE: 68 MMHG | BODY MASS INDEX: 41.83 KG/M2

## 2017-12-11 DIAGNOSIS — R41.3 MEMORY LOSS: ICD-10-CM

## 2017-12-11 DIAGNOSIS — F41.9 ANXIETY: ICD-10-CM

## 2017-12-11 DIAGNOSIS — R41.3 MEMORY LOSS: Primary | ICD-10-CM

## 2017-12-11 LAB
T4 FREE: 0.9 NG/DL (ref 0.9–1.7)
TSH SERPL DL<=0.05 MIU/L-ACNC: 2.67 UIU/ML (ref 0.27–4.2)
VITAMIN B-12: 1271 PG/ML (ref 211–946)

## 2017-12-11 PROCEDURE — G8484 FLU IMMUNIZE NO ADMIN: HCPCS | Performed by: PSYCHIATRY & NEUROLOGY

## 2017-12-11 PROCEDURE — 99204 OFFICE O/P NEW MOD 45 MIN: CPT | Performed by: PSYCHIATRY & NEUROLOGY

## 2017-12-11 PROCEDURE — 1036F TOBACCO NON-USER: CPT | Performed by: PSYCHIATRY & NEUROLOGY

## 2017-12-11 PROCEDURE — 96111 PR DEVELOPMENTAL TESTING W/INTERP & REPORT: CPT | Performed by: PSYCHIATRY & NEUROLOGY

## 2017-12-11 PROCEDURE — 1123F ACP DISCUSS/DSCN MKR DOCD: CPT | Performed by: PSYCHIATRY & NEUROLOGY

## 2017-12-11 PROCEDURE — 3017F COLORECTAL CA SCREEN DOC REV: CPT | Performed by: PSYCHIATRY & NEUROLOGY

## 2017-12-11 PROCEDURE — 4040F PNEUMOC VAC/ADMIN/RCVD: CPT | Performed by: PSYCHIATRY & NEUROLOGY

## 2017-12-11 PROCEDURE — G8427 DOCREV CUR MEDS BY ELIG CLIN: HCPCS | Performed by: PSYCHIATRY & NEUROLOGY

## 2017-12-11 PROCEDURE — G8417 CALC BMI ABV UP PARAM F/U: HCPCS | Performed by: PSYCHIATRY & NEUROLOGY

## 2017-12-11 RX ORDER — QUINIDINE GLUCONATE 324 MG
240 TABLET, EXTENDED RELEASE ORAL DAILY
COMMUNITY
End: 2019-02-27 | Stop reason: ALTCHOICE

## 2017-12-11 RX ORDER — WARFARIN SODIUM 5 MG/1
5 TABLET ORAL DAILY
COMMUNITY
End: 2019-01-07

## 2017-12-11 NOTE — PROGRESS NOTES
WRIST SURGERY         History reviewed. No pertinent family history. Allergies   Allergen Reactions    Iodine     Morphine     Shellfish-Derived Products        Social History     Social History    Marital status:      Spouse name: N/A    Number of children: N/A    Years of education: N/A     Occupational History    Not on file. Social History Main Topics    Smoking status: Never Smoker    Smokeless tobacco: Never Used    Alcohol use Yes      Comment: rarely    Drug use: No    Sexual activity: Not on file     Other Topics Concern    Not on file     Social History Narrative    No narrative on file       Review of Systems     Constitutional  No fever or chills. No diaphoresis or significant fatigue. HENT   No tinnitus or significant hearing loss. Eyes  no sudden vision change or eye pain  Respiratory  no significant shortness of breath or cough  Cardiovascular  no chest pain No palpitations or significant leg swelling  Gastrointestinal  no abdominal swelling or pain. Genitourinary  No difficulty urinating, dysuria  Musculoskeletal  no back pain or myalgia. Skin  no color change or rash  Neurologic  No seizures. No lateralizing weakness. Hematologic  no easy bruising or excessive bleeding. Psychiatric  no severe anxiety or nervousness. All other review of systems are negative. Current Outpatient Prescriptions   Medication Sig Dispense Refill    ferrous gluconate (FERGON) 240 (27 Fe) MG tablet Take 240 mg by mouth daily      warfarin (COUMADIN) 5 MG tablet Take 5 mg by mouth daily      Multiple Vitamins-Minerals (MULTIVITAMIN ADULT PO) Take by mouth      famotidine (PEPCID) 20 MG tablet Take 1 tablet by mouth 2 times daily. 180 tablet 3    FLUoxetine (PROZAC) 20 MG capsule Take 3 capsules by mouth daily. (Patient taking differently: Take 20 mg by mouth daily ) 270 capsule 3    clopidogrel (PLAVIX) 75 MG tablet Take 1 tablet by mouth daily.  90 tablet 3    (around 1/8/2018).

## 2017-12-12 ENCOUNTER — TELEPHONE (OUTPATIENT)
Dept: NEUROSURGERY | Age: 72
End: 2017-12-12

## 2017-12-13 LAB — METHYLMALONIC ACID: 0.19 UMOL/L (ref 0–0.4)

## 2017-12-29 ENCOUNTER — HOSPITAL ENCOUNTER (OUTPATIENT)
Dept: MRI IMAGING | Age: 72
Discharge: HOME | End: 2017-12-29
Payer: MEDICARE

## 2017-12-29 DIAGNOSIS — R41.3 MEMORY LOSS: ICD-10-CM

## 2017-12-29 DIAGNOSIS — F41.9 ANXIETY: ICD-10-CM

## 2017-12-29 PROCEDURE — 70551 MRI BRAIN STEM W/O DYE: CPT

## 2018-01-09 ENCOUNTER — TELEPHONE (OUTPATIENT)
Dept: NEUROSURGERY | Age: 73
End: 2018-01-09

## 2018-01-10 ENCOUNTER — TELEPHONE (OUTPATIENT)
Dept: NEUROLOGY | Age: 73
End: 2018-01-10

## 2018-01-19 ENCOUNTER — OFFICE VISIT (OUTPATIENT)
Dept: NEUROLOGY | Age: 73
End: 2018-01-19
Payer: MEDICARE

## 2018-01-19 VITALS
WEIGHT: 275.13 LBS | HEIGHT: 68 IN | HEART RATE: 62 BPM | SYSTOLIC BLOOD PRESSURE: 122 MMHG | BODY MASS INDEX: 41.7 KG/M2 | DIASTOLIC BLOOD PRESSURE: 71 MMHG

## 2018-01-19 DIAGNOSIS — F41.9 ANXIETY: ICD-10-CM

## 2018-01-19 DIAGNOSIS — F32.A ANXIETY AND DEPRESSION: ICD-10-CM

## 2018-01-19 DIAGNOSIS — R41.3 MEMORY LOSS: Primary | ICD-10-CM

## 2018-01-19 DIAGNOSIS — F41.9 ANXIETY AND DEPRESSION: ICD-10-CM

## 2018-01-19 PROCEDURE — 3017F COLORECTAL CA SCREEN DOC REV: CPT | Performed by: PSYCHIATRY & NEUROLOGY

## 2018-01-19 PROCEDURE — G8484 FLU IMMUNIZE NO ADMIN: HCPCS | Performed by: PSYCHIATRY & NEUROLOGY

## 2018-01-19 PROCEDURE — 1036F TOBACCO NON-USER: CPT | Performed by: PSYCHIATRY & NEUROLOGY

## 2018-01-19 PROCEDURE — 4040F PNEUMOC VAC/ADMIN/RCVD: CPT | Performed by: PSYCHIATRY & NEUROLOGY

## 2018-01-19 PROCEDURE — 99214 OFFICE O/P EST MOD 30 MIN: CPT | Performed by: PSYCHIATRY & NEUROLOGY

## 2018-01-19 PROCEDURE — 1123F ACP DISCUSS/DSCN MKR DOCD: CPT | Performed by: PSYCHIATRY & NEUROLOGY

## 2018-01-19 PROCEDURE — G8427 DOCREV CUR MEDS BY ELIG CLIN: HCPCS | Performed by: PSYCHIATRY & NEUROLOGY

## 2018-01-19 PROCEDURE — G8417 CALC BMI ABV UP PARAM F/U: HCPCS | Performed by: PSYCHIATRY & NEUROLOGY

## 2018-01-19 NOTE — PROGRESS NOTES
Review of Systems    Constitutional  No fever or chills. yes diaphoresis or significant fatigue. HENT   No tinnitus or significant hearing loss. Eyes  no sudden vision change or eye pain  Respiratory  no significant shortness of breath or cough  Cardiovascular  no chest pain No palpitations or significant leg swelling  Gastrointestinal  no abdominal swelling or pain. Genitourinary  No difficulty urinating, dysuria  Musculoskeletal  yes back pain or myalgia. Skin  no color change or rash  Neurologic  No seizures. No lateralizing weakness. Hematologic  yes easy bruising or excessive bleeding. Psychiatric  no severe anxiety or nervousness. All other review of systems are negative.

## 2018-01-19 NOTE — PROGRESS NOTES
Chief Complaint   Patient presents with    Memory Loss     1 month follow up, test results        Alfred Cotto is a 81y.o. year old male who is seen for evaluation of Memory loss over the last 1 year or so. The patient is unsure if this memory issues are normal or they are pathological. He has had problems sleeping since the summer of 2017. He could not remember the name of one of his grandkids for 2 weeks. He is able to do all of his activities of daily living. He has had some issues with paying the bills. He may get disoriented when driving at times according to his wife. He denies any hallucinations. He has had irritability at times. There is no family history of dementia. He may get some occasional dizziness. He has some chronic back issues following back surgery. He has a blood clotting disorder for which he is on chronic Coumadin. No new issues.     Active Ambulatory Problems     Diagnosis Date Noted    Osteoarthritis of lumbar spine 06/15/2012    B12 deficiency 06/15/2012    Sleep apnea 08/30/2012    Vertigo 02/18/2013    Memory loss 12/11/2017    Anxiety 12/11/2017    Anxiety and depression 01/19/2018     Resolved Ambulatory Problems     Diagnosis Date Noted    No Resolved Ambulatory Problems     Past Medical History:   Diagnosis Date    Anxiety 12/11/2017    CAD (coronary artery disease)     Diverticular disease     Erectile dysfunction     GERD (gastroesophageal reflux disease)     Hyperlipidemia     Hypertension     Obesity     Unspecified sleep apnea        Past Surgical History:   Procedure Laterality Date    BYPASS GRAFT      CARDIAC SURGERY  2001 and 2004    stents    CARPAL TUNNEL RELEASE      CATARACT REMOVAL  2005    CORONARY ANGIOPLASTY WITH STENT PLACEMENT      EYE SURGERY      GASTRIC BYPASS SURGERY  2002    HERNIA REPAIR      JOINT REPLACEMENT  2006    left total knee    PROSTATE SURGERY      [id number] and 1997    lumbar x 2   [facility] neck ?    TONSILLECTOMY      WRIST SURGERY         History reviewed. No pertinent family history. Allergies   Allergen Reactions    Iodine     Morphine     Shellfish-Derived Products        Social History     Social History    Marital status:      Spouse name: N/A    Number of children: N/A    Years of education: N/A     Occupational History    Not on file. Social History Main Topics    Smoking status: Never Smoker    Smokeless tobacco: Never Used    Alcohol use Yes      Comment: rarely    Drug use: No    Sexual activity: Not on file     Other Topics Concern    Not on file     Social History Narrative    No narrative on file       Review of Systems     Constitutional  No fever or chills. yes diaphoresis or significant fatigue. HENT   No tinnitus or significant hearing loss. Eyes  no sudden vision change or eye pain  Respiratory  no significant shortness of breath or cough  Cardiovascular  no chest pain No palpitations or significant leg swelling  Gastrointestinal  no abdominal swelling or pain. Genitourinary  No difficulty urinating, dysuria  Musculoskeletal  yes back pain or myalgia. Skin  no color change or rash  Neurologic  No seizures. No lateralizing weakness. Hematologic  yes easy bruising or excessive bleeding. Psychiatric  no severe anxiety or nervousness. All other review of systems are negative. Current Outpatient Prescriptions   Medication Sig Dispense Refill    ferrous gluconate (FERGON) 240 (27 Fe) MG tablet Take 240 mg by mouth daily      warfarin (COUMADIN) 5 MG tablet Take 5 mg by mouth daily      Multiple Vitamins-Minerals (MULTIVITAMIN ADULT PO) Take by mouth      famotidine (PEPCID) 20 MG tablet Take 1 tablet by mouth 2 times daily. 180 tablet 3    FLUoxetine (PROZAC) 20 MG capsule Take 3 capsules by mouth daily.  (Patient taking differently: Take 20 mg by mouth daily ) 270 capsule 3    clopidogrel (PLAVIX) 75 MG tablet Take 1 tablet by CREATININE, GLUCOSE, CALCIUM, PROT, LABALBU, BILITOT, ALKPHOS, AST, ALT, LABGLOM, GFRAA, AGRATIO, GLOB    Impression   No acute intracranial abnormality, no evidence of   hydrocephalus. There is diffuse atrophy and mild/moderate chronic   small vessel white matter ischemic change. Signed by Dr Avis Harper. Kacey on 12/29/2017 10:52 AM         Assessment    ICD-10-CM ICD-9-CM    1. Memory loss R41.3 780.93    2. Anxiety F41.9 300.00    3. Anxiety and depression F41.8 300.00      311        His neurological examination today revealed some mild cognitive impairment. He scored a 28 out of 30 on his Mini-Mental status examination losing 1 point for delayed recall 1 point for facial spatial task. He had some mild problems with similarities. Based upon his history and examination, it is unclear if he has an early dementia or simply mild cognitive impairment related to aging and mood issues. His MRI of the brain had some chronic changes of aging. His blood work was unremarkable. His neurocognitive testing revealed some non specific mild cognitive impairment. The patient indicated understanding of the management plan. He is to follow-up with me on a PRN basis and call with any further problems. Plan    No orders of the defined types were placed in this encounter. No orders of the defined types were placed in this encounter. Return if symptoms worsen or fail to improve.

## 2018-05-24 ENCOUNTER — ANTI-COAG VISIT (OUTPATIENT)
Dept: CARDIOLOGY | Age: 73
End: 2018-05-24

## 2018-06-21 ENCOUNTER — ANTI-COAG VISIT (OUTPATIENT)
Dept: CARDIOLOGY | Age: 73
End: 2018-06-21

## 2018-06-21 LAB — INR BLD: 1.6

## 2018-06-22 ENCOUNTER — HOSPITAL ENCOUNTER (OUTPATIENT)
Dept: CT IMAGING | Age: 73
Discharge: HOME | End: 2018-06-22
Payer: MEDICARE

## 2018-06-22 ENCOUNTER — HOSPITAL ENCOUNTER (OUTPATIENT)
Dept: MRI IMAGING | Age: 73
Discharge: HOME | End: 2018-06-22
Payer: MEDICARE

## 2018-06-22 DIAGNOSIS — M54.16 LUMBAR RADICULOPATHY: ICD-10-CM

## 2018-06-22 DIAGNOSIS — M54.16 RADICULOPATHY, LUMBAR REGION: ICD-10-CM

## 2018-06-22 DIAGNOSIS — Z98.1 ARTHRODESIS STATUS: ICD-10-CM

## 2018-06-22 PROCEDURE — 72148 MRI LUMBAR SPINE W/O DYE: CPT

## 2018-06-22 PROCEDURE — 72131 CT LUMBAR SPINE W/O DYE: CPT

## 2018-07-04 LAB — INR BLD: 1.7

## 2018-07-05 ENCOUNTER — ANTI-COAG VISIT (OUTPATIENT)
Dept: CARDIOLOGY | Age: 73
End: 2018-07-05

## 2018-07-24 LAB — INR BLD: 1.7

## 2018-07-26 ENCOUNTER — ANTI-COAG VISIT (OUTPATIENT)
Dept: CARDIOLOGY | Age: 73
End: 2018-07-26

## 2018-07-27 ENCOUNTER — ANTI-COAG VISIT (OUTPATIENT)
Dept: CARDIOLOGY | Age: 73
End: 2018-07-27

## 2018-08-24 ENCOUNTER — ANTI-COAG VISIT (OUTPATIENT)
Dept: CARDIOLOGY | Age: 73
End: 2018-08-24

## 2018-08-24 LAB — INR BLD: 1.8

## 2018-09-20 ENCOUNTER — ANTI-COAG VISIT (OUTPATIENT)
Dept: CARDIOLOGY | Age: 73
End: 2018-09-20

## 2018-09-20 LAB — INR BLD: 2.4

## 2018-09-20 PROCEDURE — 99999 PR OFFICE/OUTPT VISIT,PROCEDURE ONLY: CPT | Performed by: CLINICAL NURSE SPECIALIST

## 2018-09-24 ENCOUNTER — TELEPHONE (OUTPATIENT)
Dept: CARDIOLOGY | Age: 73
End: 2018-09-24

## 2018-09-25 ENCOUNTER — TELEPHONE (OUTPATIENT)
Dept: CARDIOLOGY | Age: 73
End: 2018-09-25

## 2018-09-25 NOTE — TELEPHONE ENCOUNTER
Patient called to say he is having a procedure with Dr Jasper Soriano on Monday and wanted to be sure we did a clearance for him. I let him know we have not received a request for clearance or med hold from Dr Jasper Soriano office. He states they had him stop his coumadin today and that they told him they had permission from Dr Pedro Hayes to do so. I do not see any communication r/t this in the chart. They wanted it known that they wanted to see Dr Pedro Hayes in Sept. But they were told there were no openings until later and that Dr Pedro Hayes wouldn't like that. I explained that he only has so many visits on his schedule and we can not control it being full but will do the first available. I was sorry but that is really all we can do.

## 2018-11-02 ENCOUNTER — TELEPHONE (OUTPATIENT)
Dept: CARDIOLOGY | Age: 73
End: 2018-11-02

## 2018-11-06 ENCOUNTER — ANTI-COAG VISIT (OUTPATIENT)
Dept: CARDIOLOGY | Age: 73
End: 2018-11-06

## 2018-11-06 LAB — INR BLD: 1.2

## 2018-11-09 ENCOUNTER — ANTI-COAG VISIT (OUTPATIENT)
Dept: CARDIOLOGY | Age: 73
End: 2018-11-09

## 2018-11-09 LAB — INR BLD: 2.4

## 2018-12-07 ENCOUNTER — ANTI-COAG VISIT (OUTPATIENT)
Dept: CARDIOLOGY | Age: 73
End: 2018-12-07

## 2018-12-07 LAB — INR BLD: 2.9

## 2018-12-21 LAB — INR BLD: 2.7

## 2018-12-26 ENCOUNTER — ANTI-COAG VISIT (OUTPATIENT)
Dept: CARDIOLOGY | Age: 73
End: 2018-12-26

## 2018-12-26 RX ORDER — WARFARIN SODIUM 7.5 MG/1
7.5 TABLET ORAL DAILY
Qty: 90 TABLET | Refills: 3 | Status: ON HOLD | OUTPATIENT
Start: 2018-12-26 | End: 2019-04-03 | Stop reason: HOSPADM

## 2019-01-07 ENCOUNTER — HOSPITAL ENCOUNTER (OUTPATIENT)
Age: 74
Setting detail: OBSERVATION
Discharge: HOME | End: 2019-01-09
Attending: EMERGENCY MEDICINE | Admitting: HOSPITALIST
Payer: MEDICARE

## 2019-01-07 ENCOUNTER — APPOINTMENT (OUTPATIENT)
Dept: GENERAL RADIOLOGY | Age: 74
End: 2019-01-07
Payer: MEDICARE

## 2019-01-07 DIAGNOSIS — R00.2 PALPITATIONS: Primary | ICD-10-CM

## 2019-01-07 DIAGNOSIS — I50.9 CONGESTIVE HEART FAILURE, UNSPECIFIED HF CHRONICITY, UNSPECIFIED HEART FAILURE TYPE (HCC): ICD-10-CM

## 2019-01-07 PROBLEM — R07.9 CHEST PAIN: Status: ACTIVE | Noted: 2019-01-07

## 2019-01-07 LAB
ALBUMIN SERPL-MCNC: 3.9 G/DL (ref 3.5–5.2)
ALP BLD-CCNC: 68 U/L (ref 40–130)
ALT SERPL-CCNC: 30 U/L (ref 5–41)
ANION GAP SERPL CALCULATED.3IONS-SCNC: 9 MMOL/L (ref 7–19)
APTT: 25.7 SEC (ref 26–36.2)
AST SERPL-CCNC: 31 U/L (ref 5–40)
BASOPHILS ABSOLUTE: 0 K/UL (ref 0–0.2)
BASOPHILS RELATIVE PERCENT: 0.6 % (ref 0–1)
BILIRUB SERPL-MCNC: 0.5 MG/DL (ref 0.2–1.2)
BUN BLDV-MCNC: 17 MG/DL (ref 8–23)
CALCIUM SERPL-MCNC: 8.7 MG/DL (ref 8.8–10.2)
CHLORIDE BLD-SCNC: 105 MMOL/L (ref 98–111)
CO2: 27 MMOL/L (ref 22–29)
CREAT SERPL-MCNC: 0.9 MG/DL (ref 0.5–1.2)
D DIMER: 0.37 UG/ML FEU (ref 0–0.48)
EOSINOPHILS ABSOLUTE: 0.1 K/UL (ref 0–0.6)
EOSINOPHILS RELATIVE PERCENT: 1.7 % (ref 0–5)
GFR NON-AFRICAN AMERICAN: >60
GLUCOSE BLD-MCNC: 109 MG/DL (ref 74–109)
HCT VFR BLD CALC: 39.2 % (ref 42–52)
HEMOGLOBIN: 12.9 G/DL (ref 14–18)
INR BLD: 1.13 (ref 0.88–1.18)
LYMPHOCYTES ABSOLUTE: 1.6 K/UL (ref 1.1–4.5)
LYMPHOCYTES RELATIVE PERCENT: 25.6 % (ref 20–40)
MAGNESIUM: 2 MG/DL (ref 1.6–2.4)
MCH RBC QN AUTO: 32.8 PG (ref 27–31)
MCHC RBC AUTO-ENTMCNC: 32.9 G/DL (ref 33–37)
MCV RBC AUTO: 99.7 FL (ref 80–94)
MONOCYTES ABSOLUTE: 0.9 K/UL (ref 0–0.9)
MONOCYTES RELATIVE PERCENT: 14.4 % (ref 0–10)
NEUTROPHILS ABSOLUTE: 3.7 K/UL (ref 1.5–7.5)
NEUTROPHILS RELATIVE PERCENT: 57.4 % (ref 50–65)
PDW BLD-RTO: 12.1 % (ref 11.5–14.5)
PLATELET # BLD: 146 K/UL (ref 130–400)
PMV BLD AUTO: 9.2 FL (ref 9.4–12.4)
POTASSIUM SERPL-SCNC: 4.2 MMOL/L (ref 3.5–5)
PRO-BNP: 1120 PG/ML (ref 0–900)
PROTHROMBIN TIME: 13.9 SEC (ref 12–14.6)
RBC # BLD: 3.93 M/UL (ref 4.7–6.1)
SODIUM BLD-SCNC: 141 MMOL/L (ref 136–145)
TOTAL PROTEIN: 6.6 G/DL (ref 6.6–8.7)
TROPONIN: <0.01 NG/ML (ref 0–0.03)
TSH SERPL DL<=0.05 MIU/L-ACNC: 2.28 UIU/ML (ref 0.27–4.2)
WBC # BLD: 6.4 K/UL (ref 4.8–10.8)

## 2019-01-07 PROCEDURE — 99219 PR INITIAL OBSERVATION CARE/DAY 50 MINUTES: CPT | Performed by: INTERNAL MEDICINE

## 2019-01-07 PROCEDURE — 85025 COMPLETE CBC W/AUTO DIFF WBC: CPT

## 2019-01-07 PROCEDURE — 85730 THROMBOPLASTIN TIME PARTIAL: CPT

## 2019-01-07 PROCEDURE — 96372 THER/PROPH/DIAG INJ SC/IM: CPT

## 2019-01-07 PROCEDURE — 85379 FIBRIN DEGRADATION QUANT: CPT

## 2019-01-07 PROCEDURE — 93005 ELECTROCARDIOGRAM TRACING: CPT

## 2019-01-07 PROCEDURE — 84443 ASSAY THYROID STIM HORMONE: CPT

## 2019-01-07 PROCEDURE — 83880 ASSAY OF NATRIURETIC PEPTIDE: CPT

## 2019-01-07 PROCEDURE — G0378 HOSPITAL OBSERVATION PER HR: HCPCS

## 2019-01-07 PROCEDURE — 99285 EMERGENCY DEPT VISIT HI MDM: CPT

## 2019-01-07 PROCEDURE — 99285 EMERGENCY DEPT VISIT HI MDM: CPT | Performed by: EMERGENCY MEDICINE

## 2019-01-07 PROCEDURE — 83735 ASSAY OF MAGNESIUM: CPT

## 2019-01-07 PROCEDURE — 71045 X-RAY EXAM CHEST 1 VIEW: CPT

## 2019-01-07 PROCEDURE — 2580000003 HC RX 258: Performed by: INTERNAL MEDICINE

## 2019-01-07 PROCEDURE — 80053 COMPREHEN METABOLIC PANEL: CPT

## 2019-01-07 PROCEDURE — 85610 PROTHROMBIN TIME: CPT

## 2019-01-07 PROCEDURE — 36415 COLL VENOUS BLD VENIPUNCTURE: CPT

## 2019-01-07 PROCEDURE — 84484 ASSAY OF TROPONIN QUANT: CPT

## 2019-01-07 PROCEDURE — 6360000002 HC RX W HCPCS: Performed by: INTERNAL MEDICINE

## 2019-01-07 RX ORDER — FLUOXETINE HYDROCHLORIDE 20 MG/1
60 CAPSULE ORAL DAILY
Status: DISCONTINUED | OUTPATIENT
Start: 2019-01-08 | End: 2019-01-09 | Stop reason: HOSPADM

## 2019-01-07 RX ORDER — ATORVASTATIN CALCIUM 40 MG/1
40 TABLET, FILM COATED ORAL NIGHTLY
Status: DISCONTINUED | OUTPATIENT
Start: 2019-01-07 | End: 2019-01-07 | Stop reason: SDUPTHER

## 2019-01-07 RX ORDER — FAMOTIDINE 20 MG/1
20 TABLET, FILM COATED ORAL 2 TIMES DAILY
Status: DISCONTINUED | OUTPATIENT
Start: 2019-01-07 | End: 2019-01-09 | Stop reason: HOSPADM

## 2019-01-07 RX ORDER — SODIUM CHLORIDE 0.9 % (FLUSH) 0.9 %
10 SYRINGE (ML) INJECTION PRN
Status: DISCONTINUED | OUTPATIENT
Start: 2019-01-07 | End: 2019-01-09 | Stop reason: HOSPADM

## 2019-01-07 RX ORDER — FLUOXETINE HYDROCHLORIDE 20 MG/1
20 CAPSULE ORAL DAILY
Status: DISCONTINUED | OUTPATIENT
Start: 2019-01-07 | End: 2019-01-07

## 2019-01-07 RX ORDER — METOPROLOL SUCCINATE 25 MG/1
25 TABLET, EXTENDED RELEASE ORAL DAILY
COMMUNITY

## 2019-01-07 RX ORDER — ASPIRIN 81 MG/1
81 TABLET, CHEWABLE ORAL DAILY
Status: DISCONTINUED | OUTPATIENT
Start: 2019-01-08 | End: 2019-01-09 | Stop reason: HOSPADM

## 2019-01-07 RX ORDER — ROSUVASTATIN CALCIUM 10 MG/1
10 TABLET, COATED ORAL DAILY
Status: DISCONTINUED | OUTPATIENT
Start: 2019-01-07 | End: 2019-01-09 | Stop reason: HOSPADM

## 2019-01-07 RX ORDER — SODIUM CHLORIDE 0.9 % (FLUSH) 0.9 %
10 SYRINGE (ML) INJECTION EVERY 12 HOURS SCHEDULED
Status: DISCONTINUED | OUTPATIENT
Start: 2019-01-07 | End: 2019-01-09 | Stop reason: HOSPADM

## 2019-01-07 RX ORDER — ONDANSETRON 2 MG/ML
4 INJECTION INTRAMUSCULAR; INTRAVENOUS EVERY 6 HOURS PRN
Status: DISCONTINUED | OUTPATIENT
Start: 2019-01-07 | End: 2019-01-09 | Stop reason: HOSPADM

## 2019-01-07 RX ORDER — METOPROLOL SUCCINATE 25 MG/1
25 TABLET, EXTENDED RELEASE ORAL DAILY
Status: DISCONTINUED | OUTPATIENT
Start: 2019-01-07 | End: 2019-01-07

## 2019-01-07 RX ADMIN — Medication 10 ML: at 23:17

## 2019-01-07 RX ADMIN — ENOXAPARIN SODIUM 120 MG: 120 INJECTION SUBCUTANEOUS at 23:17

## 2019-01-07 ASSESSMENT — ENCOUNTER SYMPTOMS
COUGH: 0
NAUSEA: 0
VOMITING: 0
RHINORRHEA: 0
ABDOMINAL PAIN: 0
DIARRHEA: 0
SORE THROAT: 0
BACK PAIN: 0
SHORTNESS OF BREATH: 1

## 2019-01-08 LAB
ANION GAP SERPL CALCULATED.3IONS-SCNC: 12 MMOL/L (ref 7–19)
BUN BLDV-MCNC: 16 MG/DL (ref 8–23)
CALCIUM SERPL-MCNC: 8.6 MG/DL (ref 8.8–10.2)
CHLORIDE BLD-SCNC: 105 MMOL/L (ref 98–111)
CHOLESTEROL, TOTAL: 125 MG/DL (ref 160–199)
CO2: 25 MMOL/L (ref 22–29)
CREAT SERPL-MCNC: 0.9 MG/DL (ref 0.5–1.2)
EKG P AXIS: 33 DEGREES
EKG P AXIS: 58 DEGREES
EKG P AXIS: 66 DEGREES
EKG P-R INTERVAL: 132 MS
EKG P-R INTERVAL: 194 MS
EKG P-R INTERVAL: 198 MS
EKG Q-T INTERVAL: 456 MS
EKG Q-T INTERVAL: 458 MS
EKG Q-T INTERVAL: 474 MS
EKG QRS DURATION: 96 MS
EKG QTC CALCULATION (BAZETT): 448 MS
EKG QTC CALCULATION (BAZETT): 453 MS
EKG QTC CALCULATION (BAZETT): 462 MS
EKG T AXIS: 40 DEGREES
EKG T AXIS: 44 DEGREES
EKG T AXIS: 60 DEGREES
GFR NON-AFRICAN AMERICAN: >60
GLUCOSE BLD-MCNC: 98 MG/DL (ref 74–109)
HCT VFR BLD CALC: 38.8 % (ref 42–52)
HDLC SERPL-MCNC: 38 MG/DL (ref 55–121)
HEMOGLOBIN: 12.5 G/DL (ref 14–18)
LDL CHOLESTEROL CALCULATED: 67 MG/DL
LV EF: 58 %
LVEF MODALITY: NORMAL
MAGNESIUM: 2.1 MG/DL (ref 1.6–2.4)
MCH RBC QN AUTO: 32.6 PG (ref 27–31)
MCHC RBC AUTO-ENTMCNC: 32.2 G/DL (ref 33–37)
MCV RBC AUTO: 101 FL (ref 80–94)
PDW BLD-RTO: 12.2 % (ref 11.5–14.5)
PLATELET # BLD: 143 K/UL (ref 130–400)
PMV BLD AUTO: 9.5 FL (ref 9.4–12.4)
POTASSIUM REFLEX MAGNESIUM: 4.5 MMOL/L (ref 3.5–5)
RBC # BLD: 3.84 M/UL (ref 4.7–6.1)
SODIUM BLD-SCNC: 142 MMOL/L (ref 136–145)
TRIGL SERPL-MCNC: 101 MG/DL (ref 0–149)
WBC # BLD: 7 K/UL (ref 4.8–10.8)

## 2019-01-08 PROCEDURE — 80048 BASIC METABOLIC PNL TOTAL CA: CPT

## 2019-01-08 PROCEDURE — 2580000003 HC RX 258: Performed by: INTERNAL MEDICINE

## 2019-01-08 PROCEDURE — G0378 HOSPITAL OBSERVATION PER HR: HCPCS

## 2019-01-08 PROCEDURE — 83735 ASSAY OF MAGNESIUM: CPT

## 2019-01-08 PROCEDURE — 99220 PR INITIAL OBSERVATION CARE/DAY 70 MINUTES: CPT | Performed by: HOSPITALIST

## 2019-01-08 PROCEDURE — 6360000002 HC RX W HCPCS: Performed by: INTERNAL MEDICINE

## 2019-01-08 PROCEDURE — 6360000004 HC RX CONTRAST MEDICATION: Performed by: INTERNAL MEDICINE

## 2019-01-08 PROCEDURE — 96372 THER/PROPH/DIAG INJ SC/IM: CPT

## 2019-01-08 PROCEDURE — 6370000000 HC RX 637 (ALT 250 FOR IP): Performed by: INTERNAL MEDICINE

## 2019-01-08 PROCEDURE — 85027 COMPLETE CBC AUTOMATED: CPT

## 2019-01-08 PROCEDURE — 99223 1ST HOSP IP/OBS HIGH 75: CPT | Performed by: INTERNAL MEDICINE

## 2019-01-08 PROCEDURE — 93005 ELECTROCARDIOGRAM TRACING: CPT

## 2019-01-08 PROCEDURE — C8929 TTE W OR WO FOL WCON,DOPPLER: HCPCS

## 2019-01-08 PROCEDURE — 80061 LIPID PANEL: CPT

## 2019-01-08 PROCEDURE — 36415 COLL VENOUS BLD VENIPUNCTURE: CPT

## 2019-01-08 RX ADMIN — PERFLUTREN 2.2 MG: 6.52 INJECTION, SUSPENSION INTRAVENOUS at 16:45

## 2019-01-08 RX ADMIN — ROSUVASTATIN CALCIUM 10 MG: 10 TABLET, COATED ORAL at 21:44

## 2019-01-08 RX ADMIN — FAMOTIDINE 20 MG: 20 TABLET, FILM COATED ORAL at 21:35

## 2019-01-08 RX ADMIN — FAMOTIDINE 20 MG: 20 TABLET, FILM COATED ORAL at 09:36

## 2019-01-08 RX ADMIN — FLUOXETINE HYDROCHLORIDE 60 MG: 20 CAPSULE ORAL at 09:36

## 2019-01-08 RX ADMIN — ASPIRIN 81 MG CHEWABLE TABLET 81 MG: 81 TABLET CHEWABLE at 09:47

## 2019-01-08 RX ADMIN — Medication 10 ML: at 09:37

## 2019-01-08 RX ADMIN — Medication 10 ML: at 21:35

## 2019-01-08 RX ADMIN — ENOXAPARIN SODIUM 120 MG: 120 INJECTION SUBCUTANEOUS at 21:35

## 2019-01-08 ASSESSMENT — ENCOUNTER SYMPTOMS
SHORTNESS OF BREATH: 0
VOMITING: 0
EYES NEGATIVE: 1
NAUSEA: 0
GASTROINTESTINAL NEGATIVE: 1
RESPIRATORY NEGATIVE: 1
DIARRHEA: 0

## 2019-01-08 ASSESSMENT — PAIN SCALES - GENERAL
PAINLEVEL_OUTOF10: 0

## 2019-01-09 ENCOUNTER — APPOINTMENT (OUTPATIENT)
Dept: NUCLEAR MEDICINE | Age: 74
End: 2019-01-09
Payer: MEDICARE

## 2019-01-09 VITALS
RESPIRATION RATE: 16 BRPM | DIASTOLIC BLOOD PRESSURE: 74 MMHG | WEIGHT: 261.8 LBS | HEIGHT: 68 IN | TEMPERATURE: 96.8 F | OXYGEN SATURATION: 92 % | HEART RATE: 58 BPM | SYSTOLIC BLOOD PRESSURE: 123 MMHG | BODY MASS INDEX: 39.68 KG/M2

## 2019-01-09 LAB
ANION GAP SERPL CALCULATED.3IONS-SCNC: 8 MMOL/L (ref 7–19)
BUN BLDV-MCNC: 15 MG/DL (ref 8–23)
CALCIUM SERPL-MCNC: 8.6 MG/DL (ref 8.8–10.2)
CHLORIDE BLD-SCNC: 105 MMOL/L (ref 98–111)
CO2: 29 MMOL/L (ref 22–29)
CREAT SERPL-MCNC: 0.9 MG/DL (ref 0.5–1.2)
GFR NON-AFRICAN AMERICAN: >60
GLUCOSE BLD-MCNC: 100 MG/DL (ref 74–109)
HCT VFR BLD CALC: 36 % (ref 42–52)
HEMOGLOBIN: 12.5 G/DL (ref 14–18)
INR BLD: 1.18 (ref 0.88–1.18)
MCH RBC QN AUTO: 34.1 PG (ref 27–31)
MCHC RBC AUTO-ENTMCNC: 34.7 G/DL (ref 33–37)
MCV RBC AUTO: 98.1 FL (ref 80–94)
PDW BLD-RTO: 12.1 % (ref 11.5–14.5)
PLATELET # BLD: 141 K/UL (ref 130–400)
PMV BLD AUTO: 9.5 FL (ref 9.4–12.4)
POTASSIUM SERPL-SCNC: 4.2 MMOL/L (ref 3.5–5)
PROTHROMBIN TIME: 14.4 SEC (ref 12–14.6)
RBC # BLD: 3.67 M/UL (ref 4.7–6.1)
SODIUM BLD-SCNC: 142 MMOL/L (ref 136–145)
WBC # BLD: 6.3 K/UL (ref 4.8–10.8)

## 2019-01-09 PROCEDURE — 6360000002 HC RX W HCPCS: Performed by: INTERNAL MEDICINE

## 2019-01-09 PROCEDURE — 6370000000 HC RX 637 (ALT 250 FOR IP): Performed by: INTERNAL MEDICINE

## 2019-01-09 PROCEDURE — G0378 HOSPITAL OBSERVATION PER HR: HCPCS

## 2019-01-09 PROCEDURE — 85027 COMPLETE CBC AUTOMATED: CPT

## 2019-01-09 PROCEDURE — 96372 THER/PROPH/DIAG INJ SC/IM: CPT

## 2019-01-09 PROCEDURE — 3430000000 HC RX DIAGNOSTIC RADIOPHARMACEUTICAL: Performed by: INTERNAL MEDICINE

## 2019-01-09 PROCEDURE — A9500 TC99M SESTAMIBI: HCPCS | Performed by: INTERNAL MEDICINE

## 2019-01-09 PROCEDURE — 93017 CV STRESS TEST TRACING ONLY: CPT

## 2019-01-09 PROCEDURE — 93225 XTRNL ECG REC<48 HRS REC: CPT

## 2019-01-09 PROCEDURE — 78452 HT MUSCLE IMAGE SPECT MULT: CPT

## 2019-01-09 PROCEDURE — 36415 COLL VENOUS BLD VENIPUNCTURE: CPT

## 2019-01-09 PROCEDURE — 2580000003 HC RX 258: Performed by: INTERNAL MEDICINE

## 2019-01-09 PROCEDURE — 80048 BASIC METABOLIC PNL TOTAL CA: CPT

## 2019-01-09 PROCEDURE — 85610 PROTHROMBIN TIME: CPT

## 2019-01-09 PROCEDURE — 93226 XTRNL ECG REC<48 HR SCAN A/R: CPT

## 2019-01-09 PROCEDURE — 99213 OFFICE O/P EST LOW 20 MIN: CPT | Performed by: INTERNAL MEDICINE

## 2019-01-09 RX ADMIN — FAMOTIDINE 20 MG: 20 TABLET, FILM COATED ORAL at 09:56

## 2019-01-09 RX ADMIN — ASPIRIN 81 MG CHEWABLE TABLET 81 MG: 81 TABLET CHEWABLE at 09:55

## 2019-01-09 RX ADMIN — TETRAKIS(2-METHOXYISOBUTYLISOCYANIDE)COPPER(I) TETRAFLUOROBORATE 10 MILLICURIE: 1 INJECTION, POWDER, LYOPHILIZED, FOR SOLUTION INTRAVENOUS at 09:16

## 2019-01-09 RX ADMIN — TETRAKIS(2-METHOXYISOBUTYLISOCYANIDE)COPPER(I) TETRAFLUOROBORATE 30 MILLICURIE: 1 INJECTION, POWDER, LYOPHILIZED, FOR SOLUTION INTRAVENOUS at 09:17

## 2019-01-09 RX ADMIN — REGADENOSON 0.4 MG: 0.08 INJECTION, SOLUTION INTRAVENOUS at 09:16

## 2019-01-09 RX ADMIN — ENOXAPARIN SODIUM 120 MG: 120 INJECTION SUBCUTANEOUS at 11:47

## 2019-01-09 RX ADMIN — FLUOXETINE HYDROCHLORIDE 60 MG: 20 CAPSULE ORAL at 09:56

## 2019-01-09 RX ADMIN — Medication 10 ML: at 09:56

## 2019-01-09 RX ADMIN — Medication 25 MG: at 09:56

## 2019-01-09 ASSESSMENT — PAIN SCALES - GENERAL
PAINLEVEL_OUTOF10: 0
PAINLEVEL_OUTOF10: 0

## 2019-01-10 LAB
LV EF: 52 %
LVEF MODALITY: NORMAL

## 2019-01-14 ENCOUNTER — ANTI-COAG VISIT (OUTPATIENT)
Dept: CARDIOLOGY | Age: 74
End: 2019-01-14

## 2019-01-14 LAB — INR BLD: 1.2

## 2019-01-17 ENCOUNTER — OFFICE VISIT (OUTPATIENT)
Dept: CARDIOLOGY | Age: 74
End: 2019-01-17
Payer: MEDICARE

## 2019-01-17 ENCOUNTER — ANTI-COAG VISIT (OUTPATIENT)
Dept: CARDIOLOGY | Age: 74
End: 2019-01-17

## 2019-01-17 VITALS
HEIGHT: 68 IN | BODY MASS INDEX: 40.62 KG/M2 | WEIGHT: 268 LBS | DIASTOLIC BLOOD PRESSURE: 62 MMHG | SYSTOLIC BLOOD PRESSURE: 104 MMHG | HEART RATE: 60 BPM

## 2019-01-17 DIAGNOSIS — Z79.01 CHRONIC ANTICOAGULATION: ICD-10-CM

## 2019-01-17 DIAGNOSIS — Z95.5 H/O HEART ARTERY STENT: ICD-10-CM

## 2019-01-17 DIAGNOSIS — R00.2 PALPITATIONS: Primary | ICD-10-CM

## 2019-01-17 DIAGNOSIS — I10 ESSENTIAL HYPERTENSION: ICD-10-CM

## 2019-01-17 DIAGNOSIS — R94.39 ABNORMAL STRESS TEST: ICD-10-CM

## 2019-01-17 DIAGNOSIS — I25.10 CORONARY ARTERY DISEASE INVOLVING NATIVE CORONARY ARTERY OF NATIVE HEART WITHOUT ANGINA PECTORIS: ICD-10-CM

## 2019-01-17 DIAGNOSIS — Z95.1 HX OF CABG: ICD-10-CM

## 2019-01-17 LAB — INR BLD: 1.8

## 2019-01-17 PROCEDURE — 1123F ACP DISCUSS/DSCN MKR DOCD: CPT | Performed by: INTERNAL MEDICINE

## 2019-01-17 PROCEDURE — G8484 FLU IMMUNIZE NO ADMIN: HCPCS | Performed by: INTERNAL MEDICINE

## 2019-01-17 PROCEDURE — 99214 OFFICE O/P EST MOD 30 MIN: CPT | Performed by: INTERNAL MEDICINE

## 2019-01-17 PROCEDURE — G8417 CALC BMI ABV UP PARAM F/U: HCPCS | Performed by: INTERNAL MEDICINE

## 2019-01-17 PROCEDURE — 1036F TOBACCO NON-USER: CPT | Performed by: INTERNAL MEDICINE

## 2019-01-17 PROCEDURE — 4040F PNEUMOC VAC/ADMIN/RCVD: CPT | Performed by: INTERNAL MEDICINE

## 2019-01-17 PROCEDURE — 1101F PT FALLS ASSESS-DOCD LE1/YR: CPT | Performed by: INTERNAL MEDICINE

## 2019-01-17 PROCEDURE — G8427 DOCREV CUR MEDS BY ELIG CLIN: HCPCS | Performed by: INTERNAL MEDICINE

## 2019-01-17 PROCEDURE — 3017F COLORECTAL CA SCREEN DOC REV: CPT | Performed by: INTERNAL MEDICINE

## 2019-01-17 PROCEDURE — G8598 ASA/ANTIPLAT THER USED: HCPCS | Performed by: INTERNAL MEDICINE

## 2019-01-17 ASSESSMENT — ENCOUNTER SYMPTOMS
GASTROINTESTINAL NEGATIVE: 1
NAUSEA: 0
SHORTNESS OF BREATH: 0
VOMITING: 0
RESPIRATORY NEGATIVE: 1
DIARRHEA: 0
EYES NEGATIVE: 1

## 2019-01-30 ENCOUNTER — ANTI-COAG VISIT (OUTPATIENT)
Dept: CARDIOLOGY | Age: 74
End: 2019-01-30

## 2019-01-30 LAB
INR BLD: 2
INR BLD: 2
INR BLD: 2.3

## 2019-02-05 ENCOUNTER — OFFICE VISIT (OUTPATIENT)
Dept: CARDIOLOGY | Age: 74
End: 2019-02-05
Payer: MEDICARE

## 2019-02-05 VITALS
SYSTOLIC BLOOD PRESSURE: 138 MMHG | HEART RATE: 54 BPM | DIASTOLIC BLOOD PRESSURE: 72 MMHG | HEIGHT: 68 IN | BODY MASS INDEX: 39.86 KG/M2 | WEIGHT: 263 LBS

## 2019-02-05 DIAGNOSIS — I10 ESSENTIAL HYPERTENSION: ICD-10-CM

## 2019-02-05 DIAGNOSIS — I25.10 CORONARY ARTERY DISEASE INVOLVING NATIVE CORONARY ARTERY OF NATIVE HEART WITHOUT ANGINA PECTORIS: Primary | ICD-10-CM

## 2019-02-05 DIAGNOSIS — Z79.01 CHRONIC ANTICOAGULATION: ICD-10-CM

## 2019-02-05 DIAGNOSIS — I49.3 PVC (PREMATURE VENTRICULAR CONTRACTION): ICD-10-CM

## 2019-02-05 DIAGNOSIS — R00.2 PALPITATIONS: ICD-10-CM

## 2019-02-05 DIAGNOSIS — Z95.1 HX OF CABG: ICD-10-CM

## 2019-02-05 DIAGNOSIS — G47.33 OBSTRUCTIVE SLEEP APNEA SYNDROME: ICD-10-CM

## 2019-02-05 PROCEDURE — G8598 ASA/ANTIPLAT THER USED: HCPCS | Performed by: NURSE PRACTITIONER

## 2019-02-05 PROCEDURE — 99214 OFFICE O/P EST MOD 30 MIN: CPT | Performed by: NURSE PRACTITIONER

## 2019-02-05 PROCEDURE — G8484 FLU IMMUNIZE NO ADMIN: HCPCS | Performed by: NURSE PRACTITIONER

## 2019-02-05 PROCEDURE — G8427 DOCREV CUR MEDS BY ELIG CLIN: HCPCS | Performed by: NURSE PRACTITIONER

## 2019-02-05 PROCEDURE — 1101F PT FALLS ASSESS-DOCD LE1/YR: CPT | Performed by: NURSE PRACTITIONER

## 2019-02-05 PROCEDURE — 1036F TOBACCO NON-USER: CPT | Performed by: NURSE PRACTITIONER

## 2019-02-05 PROCEDURE — 3017F COLORECTAL CA SCREEN DOC REV: CPT | Performed by: NURSE PRACTITIONER

## 2019-02-05 PROCEDURE — G8417 CALC BMI ABV UP PARAM F/U: HCPCS | Performed by: NURSE PRACTITIONER

## 2019-02-05 PROCEDURE — 1123F ACP DISCUSS/DSCN MKR DOCD: CPT | Performed by: NURSE PRACTITIONER

## 2019-02-05 PROCEDURE — 93000 ELECTROCARDIOGRAM COMPLETE: CPT | Performed by: NURSE PRACTITIONER

## 2019-02-05 PROCEDURE — 4040F PNEUMOC VAC/ADMIN/RCVD: CPT | Performed by: NURSE PRACTITIONER

## 2019-02-05 RX ORDER — HYDROCODONE BITARTRATE AND ACETAMINOPHEN 7.5; 325 MG/1; MG/1
1 TABLET ORAL EVERY 6 HOURS PRN
Status: ON HOLD | COMMUNITY
End: 2019-03-20 | Stop reason: HOSPADM

## 2019-02-05 RX ORDER — NITROGLYCERIN 0.4 MG/1
0.4 TABLET SUBLINGUAL EVERY 5 MIN PRN
Qty: 25 TABLET | Refills: 3 | Status: SHIPPED | OUTPATIENT
Start: 2019-02-05 | End: 2019-11-11 | Stop reason: SDUPTHER

## 2019-02-07 ENCOUNTER — TELEPHONE (OUTPATIENT)
Dept: CARDIOLOGY | Age: 74
End: 2019-02-07

## 2019-02-27 ENCOUNTER — HOSPITAL ENCOUNTER (OUTPATIENT)
Dept: PREADMISSION TESTING | Age: 74
Discharge: HOME | End: 2019-03-03
Payer: MEDICARE

## 2019-02-27 ENCOUNTER — HOSPITAL ENCOUNTER (OUTPATIENT)
Dept: GENERAL RADIOLOGY | Age: 74
Discharge: HOME | End: 2019-02-27
Payer: MEDICARE

## 2019-02-27 VITALS — WEIGHT: 263 LBS | HEIGHT: 68 IN | BODY MASS INDEX: 39.86 KG/M2

## 2019-02-27 LAB
ALBUMIN SERPL-MCNC: 3.9 G/DL (ref 3.5–5.2)
ALP BLD-CCNC: 71 U/L (ref 40–130)
ALT SERPL-CCNC: 26 U/L (ref 5–41)
ANION GAP SERPL CALCULATED.3IONS-SCNC: 12 MMOL/L (ref 7–19)
APTT: 51.8 SEC (ref 26–36.2)
AST SERPL-CCNC: 27 U/L (ref 5–40)
BACTERIA: NEGATIVE /HPF
BASOPHILS ABSOLUTE: 0 K/UL (ref 0–0.2)
BASOPHILS RELATIVE PERCENT: 0.4 % (ref 0–1)
BILIRUB SERPL-MCNC: 0.4 MG/DL (ref 0.2–1.2)
BILIRUBIN URINE: NEGATIVE
BLOOD, URINE: ABNORMAL
BUN BLDV-MCNC: 11 MG/DL (ref 8–23)
CALCIUM SERPL-MCNC: 8.8 MG/DL (ref 8.8–10.2)
CHLORIDE BLD-SCNC: 103 MMOL/L (ref 98–111)
CLARITY: CLEAR
CO2: 25 MMOL/L (ref 22–29)
COLOR: YELLOW
CREAT SERPL-MCNC: 1 MG/DL (ref 0.5–1.2)
EOSINOPHILS ABSOLUTE: 0.1 K/UL (ref 0–0.6)
EOSINOPHILS RELATIVE PERCENT: 1.6 % (ref 0–5)
EPITHELIAL CELLS, UA: 1 /HPF (ref 0–5)
GFR NON-AFRICAN AMERICAN: >60
GLUCOSE BLD-MCNC: 94 MG/DL (ref 74–109)
GLUCOSE URINE: NEGATIVE MG/DL
HCT VFR BLD CALC: 41.6 % (ref 42–52)
HEMOGLOBIN: 13.5 G/DL (ref 14–18)
HYALINE CASTS: 1 /HPF (ref 0–8)
INR BLD: 3.31 (ref 0.88–1.18)
KETONES, URINE: NEGATIVE MG/DL
LEUKOCYTE ESTERASE, URINE: NEGATIVE
LYMPHOCYTES ABSOLUTE: 1.1 K/UL (ref 1.1–4.5)
LYMPHOCYTES RELATIVE PERCENT: 15.9 % (ref 20–40)
MCH RBC QN AUTO: 32.8 PG (ref 27–31)
MCHC RBC AUTO-ENTMCNC: 32.5 G/DL (ref 33–37)
MCV RBC AUTO: 101 FL (ref 80–94)
MONOCYTES ABSOLUTE: 1.2 K/UL (ref 0–0.9)
MONOCYTES RELATIVE PERCENT: 17.9 % (ref 0–10)
NEUTROPHILS ABSOLUTE: 4.4 K/UL (ref 1.5–7.5)
NEUTROPHILS RELATIVE PERCENT: 63.9 % (ref 50–65)
NITRITE, URINE: NEGATIVE
PDW BLD-RTO: 12.3 % (ref 11.5–14.5)
PH UA: 6
PLATELET # BLD: 153 K/UL (ref 130–400)
PMV BLD AUTO: 9.3 FL (ref 9.4–12.4)
POTASSIUM SERPL-SCNC: 4.7 MMOL/L (ref 3.5–5)
PROTEIN UA: NEGATIVE MG/DL
PROTHROMBIN TIME: 32.8 SEC (ref 12–14.6)
RBC # BLD: 4.12 M/UL (ref 4.7–6.1)
RBC UA: 0 /HPF (ref 0–4)
SODIUM BLD-SCNC: 140 MMOL/L (ref 136–145)
SPECIFIC GRAVITY UA: 1.01
TOTAL PROTEIN: 6.8 G/DL (ref 6.6–8.7)
URINE REFLEX TO CULTURE: ABNORMAL
UROBILINOGEN, URINE: 0.2 E.U./DL
WBC # BLD: 6.9 K/UL (ref 4.8–10.8)
WBC UA: 0 /HPF (ref 0–5)

## 2019-02-27 PROCEDURE — 81001 URINALYSIS AUTO W/SCOPE: CPT

## 2019-02-27 PROCEDURE — 85025 COMPLETE CBC W/AUTO DIFF WBC: CPT

## 2019-02-27 PROCEDURE — 80053 COMPREHEN METABOLIC PANEL: CPT

## 2019-02-27 PROCEDURE — 85730 THROMBOPLASTIN TIME PARTIAL: CPT

## 2019-02-27 PROCEDURE — 85610 PROTHROMBIN TIME: CPT

## 2019-02-27 PROCEDURE — 71046 X-RAY EXAM CHEST 2 VIEWS: CPT

## 2019-02-27 RX ORDER — CETIRIZINE HYDROCHLORIDE 10 MG/1
10 TABLET ORAL DAILY
COMMUNITY

## 2019-03-01 ENCOUNTER — ANTI-COAG VISIT (OUTPATIENT)
Dept: CARDIOLOGY | Age: 74
End: 2019-03-01

## 2019-03-04 ENCOUNTER — TELEPHONE (OUTPATIENT)
Dept: CARDIOLOGY | Age: 74
End: 2019-03-04

## 2019-03-08 ENCOUNTER — ANTI-COAG VISIT (OUTPATIENT)
Dept: CARDIOLOGY | Age: 74
End: 2019-03-08

## 2019-03-08 LAB — INR BLD: 2.8

## 2019-03-19 ENCOUNTER — HOSPITAL ENCOUNTER (OUTPATIENT)
Age: 74
Setting detail: OUTPATIENT SURGERY
Discharge: HOME | End: 2019-03-19
Payer: MEDICARE

## 2019-03-19 ENCOUNTER — ANESTHESIA (OUTPATIENT)
Dept: OPERATING ROOM | Age: 74
End: 2019-03-19
Payer: MEDICARE

## 2019-03-19 ENCOUNTER — ANESTHESIA EVENT (OUTPATIENT)
Dept: OPERATING ROOM | Age: 74
End: 2019-03-19
Payer: MEDICARE

## 2019-03-19 VITALS
OXYGEN SATURATION: 94 % | HEIGHT: 68 IN | TEMPERATURE: 96.9 F | DIASTOLIC BLOOD PRESSURE: 79 MMHG | WEIGHT: 263 LBS | RESPIRATION RATE: 12 BRPM | HEART RATE: 55 BPM | BODY MASS INDEX: 39.86 KG/M2 | SYSTOLIC BLOOD PRESSURE: 154 MMHG

## 2019-03-19 LAB
ABO/RH: NORMAL
ANTIBODY SCREEN: NORMAL
APTT: 41.1 SEC (ref 26–36.2)
INR BLD: 1.11 (ref 0.88–1.18)
PROTHROMBIN TIME: 13.7 SEC (ref 12–14.6)

## 2019-03-19 PROCEDURE — 85610 PROTHROMBIN TIME: CPT

## 2019-03-19 PROCEDURE — 86850 RBC ANTIBODY SCREEN: CPT

## 2019-03-19 PROCEDURE — 2580000003 HC RX 258

## 2019-03-19 PROCEDURE — 86901 BLOOD TYPING SEROLOGIC RH(D): CPT

## 2019-03-19 PROCEDURE — 85730 THROMBOPLASTIN TIME PARTIAL: CPT

## 2019-03-19 PROCEDURE — 86900 BLOOD TYPING SEROLOGIC ABO: CPT

## 2019-03-19 PROCEDURE — 36415 COLL VENOUS BLD VENIPUNCTURE: CPT

## 2019-03-19 RX ORDER — LIDOCAINE HYDROCHLORIDE 10 MG/ML
1 INJECTION, SOLUTION EPIDURAL; INFILTRATION; INTRACAUDAL; PERINEURAL
Status: DISCONTINUED | OUTPATIENT
Start: 2019-03-19 | End: 2019-03-19 | Stop reason: HOSPADM

## 2019-03-19 RX ORDER — MIDAZOLAM HYDROCHLORIDE 1 MG/ML
2 INJECTION INTRAMUSCULAR; INTRAVENOUS
Status: DISCONTINUED | OUTPATIENT
Start: 2019-03-19 | End: 2019-03-19 | Stop reason: HOSPADM

## 2019-03-19 RX ORDER — LIDOCAINE HYDROCHLORIDE 10 MG/ML
1 INJECTION, SOLUTION EPIDURAL; INFILTRATION; INTRACAUDAL; PERINEURAL ONCE
Status: DISCONTINUED | OUTPATIENT
Start: 2019-03-19 | End: 2019-03-19 | Stop reason: HOSPADM

## 2019-03-19 RX ORDER — FENTANYL CITRATE 50 UG/ML
25 INJECTION, SOLUTION INTRAMUSCULAR; INTRAVENOUS
Status: DISCONTINUED | OUTPATIENT
Start: 2019-03-19 | End: 2019-03-19 | Stop reason: HOSPADM

## 2019-03-19 RX ORDER — SODIUM CHLORIDE, SODIUM LACTATE, POTASSIUM CHLORIDE, CALCIUM CHLORIDE 600; 310; 30; 20 MG/100ML; MG/100ML; MG/100ML; MG/100ML
INJECTION, SOLUTION INTRAVENOUS CONTINUOUS
Status: DISCONTINUED | OUTPATIENT
Start: 2019-03-19 | End: 2019-03-19 | Stop reason: HOSPADM

## 2019-03-19 RX ORDER — FENTANYL CITRATE 50 UG/ML
50 INJECTION, SOLUTION INTRAMUSCULAR; INTRAVENOUS
Status: DISCONTINUED | OUTPATIENT
Start: 2019-03-19 | End: 2019-03-19 | Stop reason: HOSPADM

## 2019-03-19 RX ORDER — SODIUM CHLORIDE 0.9 % (FLUSH) 0.9 %
10 SYRINGE (ML) INJECTION EVERY 12 HOURS SCHEDULED
Status: DISCONTINUED | OUTPATIENT
Start: 2019-03-19 | End: 2019-03-19 | Stop reason: HOSPADM

## 2019-03-19 RX ORDER — SODIUM CHLORIDE 0.9 % (FLUSH) 0.9 %
10 SYRINGE (ML) INJECTION PRN
Status: DISCONTINUED | OUTPATIENT
Start: 2019-03-19 | End: 2019-03-19 | Stop reason: HOSPADM

## 2019-03-19 RX ADMIN — SODIUM CHLORIDE, POTASSIUM CHLORIDE, SODIUM LACTATE AND CALCIUM CHLORIDE: 600; 310; 30; 20 INJECTION, SOLUTION INTRAVENOUS at 06:53

## 2019-03-19 ASSESSMENT — LIFESTYLE VARIABLES: SMOKING_STATUS: 0

## 2019-03-19 ASSESSMENT — PAIN - FUNCTIONAL ASSESSMENT: PAIN_FUNCTIONAL_ASSESSMENT: 0-10

## 2019-03-20 ENCOUNTER — HOSPITAL ENCOUNTER (OUTPATIENT)
Age: 74
Setting detail: OUTPATIENT SURGERY
Discharge: HOME | End: 2019-03-20
Attending: PAIN MEDICINE | Admitting: PAIN MEDICINE
Payer: MEDICARE

## 2019-03-20 ENCOUNTER — ANESTHESIA EVENT (OUTPATIENT)
Dept: OPERATING ROOM | Age: 74
End: 2019-03-20
Payer: MEDICARE

## 2019-03-20 ENCOUNTER — APPOINTMENT (OUTPATIENT)
Dept: GENERAL RADIOLOGY | Age: 74
End: 2019-03-20
Attending: PAIN MEDICINE
Payer: MEDICARE

## 2019-03-20 ENCOUNTER — ANESTHESIA (OUTPATIENT)
Dept: OPERATING ROOM | Age: 74
End: 2019-03-20
Payer: MEDICARE

## 2019-03-20 VITALS
TEMPERATURE: 96 F | OXYGEN SATURATION: 97 % | DIASTOLIC BLOOD PRESSURE: 74 MMHG | RESPIRATION RATE: 21 BRPM | SYSTOLIC BLOOD PRESSURE: 119 MMHG

## 2019-03-20 VITALS
DIASTOLIC BLOOD PRESSURE: 71 MMHG | OXYGEN SATURATION: 97 % | TEMPERATURE: 98.4 F | SYSTOLIC BLOOD PRESSURE: 152 MMHG | RESPIRATION RATE: 16 BRPM | HEART RATE: 64 BPM

## 2019-03-20 DIAGNOSIS — M54.50 CHRONIC BILATERAL LOW BACK PAIN WITHOUT SCIATICA: Primary | ICD-10-CM

## 2019-03-20 DIAGNOSIS — G89.29 CHRONIC BILATERAL LOW BACK PAIN WITHOUT SCIATICA: Primary | ICD-10-CM

## 2019-03-20 LAB — APTT: 26.2 SEC (ref 26–36.2)

## 2019-03-20 PROCEDURE — 6360000002 HC RX W HCPCS: Performed by: PAIN MEDICINE

## 2019-03-20 PROCEDURE — 3700000001 HC ADD 15 MINUTES (ANESTHESIA): Performed by: PAIN MEDICINE

## 2019-03-20 PROCEDURE — 2780000010 HC IMPLANT OTHER: Performed by: PAIN MEDICINE

## 2019-03-20 PROCEDURE — 2580000003 HC RX 258: Performed by: ANESTHESIOLOGY

## 2019-03-20 PROCEDURE — 36415 COLL VENOUS BLD VENIPUNCTURE: CPT

## 2019-03-20 PROCEDURE — 2580000003 HC RX 258: Performed by: PAIN MEDICINE

## 2019-03-20 PROCEDURE — C1820 GENERATOR NEURO RECHG BAT SY: HCPCS | Performed by: PAIN MEDICINE

## 2019-03-20 PROCEDURE — 7100000010 HC PHASE II RECOVERY - FIRST 15 MIN: Performed by: PAIN MEDICINE

## 2019-03-20 PROCEDURE — 2500000003 HC RX 250 WO HCPCS: Performed by: PAIN MEDICINE

## 2019-03-20 PROCEDURE — 3600000014 HC SURGERY LEVEL 4 ADDTL 15MIN: Performed by: PAIN MEDICINE

## 2019-03-20 PROCEDURE — 3700000000 HC ANESTHESIA ATTENDED CARE: Performed by: PAIN MEDICINE

## 2019-03-20 PROCEDURE — 2500000003 HC RX 250 WO HCPCS: Performed by: NURSE ANESTHETIST, CERTIFIED REGISTERED

## 2019-03-20 PROCEDURE — 7100000001 HC PACU RECOVERY - ADDTL 15 MIN: Performed by: PAIN MEDICINE

## 2019-03-20 PROCEDURE — 72100 X-RAY EXAM L-S SPINE 2/3 VWS: CPT

## 2019-03-20 PROCEDURE — 2500000003 HC RX 250 WO HCPCS: Performed by: ANESTHESIOLOGY

## 2019-03-20 PROCEDURE — 7100000011 HC PHASE II RECOVERY - ADDTL 15 MIN: Performed by: PAIN MEDICINE

## 2019-03-20 PROCEDURE — 3209999900 FLUORO FOR SURGICAL PROCEDURES

## 2019-03-20 PROCEDURE — 3600000004 HC SURGERY LEVEL 4 BASE: Performed by: PAIN MEDICINE

## 2019-03-20 PROCEDURE — 2709999900 HC NON-CHARGEABLE SUPPLY: Performed by: PAIN MEDICINE

## 2019-03-20 PROCEDURE — C1778 LEAD, NEUROSTIMULATOR: HCPCS | Performed by: PAIN MEDICINE

## 2019-03-20 PROCEDURE — 7100000000 HC PACU RECOVERY - FIRST 15 MIN: Performed by: PAIN MEDICINE

## 2019-03-20 PROCEDURE — C1787 PATIENT PROGR, NEUROSTIM: HCPCS | Performed by: PAIN MEDICINE

## 2019-03-20 PROCEDURE — 85730 THROMBOPLASTIN TIME PARTIAL: CPT

## 2019-03-20 PROCEDURE — 6360000002 HC RX W HCPCS: Performed by: ANESTHESIOLOGY

## 2019-03-20 PROCEDURE — 2720000010 HC SURG SUPPLY STERILE: Performed by: PAIN MEDICINE

## 2019-03-20 PROCEDURE — 6360000002 HC RX W HCPCS: Performed by: NURSE ANESTHETIST, CERTIFIED REGISTERED

## 2019-03-20 DEVICE — NERVE STIMULATOR KIT IMPL PULSE KT: Type: IMPLANTABLE DEVICE | Site: SPINE THORACIC | Status: FUNCTIONAL

## 2019-03-20 DEVICE — 50CM 8 CONTACT LEAD KIT
Type: IMPLANTABLE DEVICE | Site: SPINE THORACIC | Status: FUNCTIONAL
Brand: LINEAR™ ST

## 2019-03-20 DEVICE — ANCHOR
Type: IMPLANTABLE DEVICE | Site: SPINE THORACIC | Status: FUNCTIONAL
Brand: CLIK™ X

## 2019-03-20 RX ORDER — MEPERIDINE HYDROCHLORIDE 50 MG/ML
12.5 INJECTION INTRAMUSCULAR; INTRAVENOUS; SUBCUTANEOUS EVERY 5 MIN PRN
Status: DISCONTINUED | OUTPATIENT
Start: 2019-03-20 | End: 2019-03-20 | Stop reason: HOSPADM

## 2019-03-20 RX ORDER — BUPIVACAINE HYDROCHLORIDE AND EPINEPHRINE 2.5; 5 MG/ML; UG/ML
INJECTION, SOLUTION EPIDURAL; INFILTRATION; INTRACAUDAL; PERINEURAL PRN
Status: DISCONTINUED | OUTPATIENT
Start: 2019-03-20 | End: 2019-03-20 | Stop reason: ALTCHOICE

## 2019-03-20 RX ORDER — BACITRACIN 50000 [USP'U]/1
INJECTION, POWDER, LYOPHILIZED, FOR SOLUTION INTRAMUSCULAR PRN
Status: DISCONTINUED | OUTPATIENT
Start: 2019-03-20 | End: 2019-03-20 | Stop reason: ALTCHOICE

## 2019-03-20 RX ORDER — SODIUM CHLORIDE 0.9 % (FLUSH) 0.9 %
10 SYRINGE (ML) INJECTION PRN
Status: DISCONTINUED | OUTPATIENT
Start: 2019-03-20 | End: 2019-03-20 | Stop reason: HOSPADM

## 2019-03-20 RX ORDER — OXYCODONE AND ACETAMINOPHEN 7.5; 325 MG/1; MG/1
1 TABLET ORAL EVERY 6 HOURS PRN
Qty: 40 TABLET | Refills: 0 | Status: SHIPPED | OUTPATIENT
Start: 2019-03-20 | End: 2019-04-03

## 2019-03-20 RX ORDER — LABETALOL HYDROCHLORIDE 5 MG/ML
5 INJECTION, SOLUTION INTRAVENOUS EVERY 10 MIN PRN
Status: DISCONTINUED | OUTPATIENT
Start: 2019-03-20 | End: 2019-03-20 | Stop reason: HOSPADM

## 2019-03-20 RX ORDER — OXYCODONE HYDROCHLORIDE 5 MG/1
10 TABLET ORAL PRN
Status: DISCONTINUED | OUTPATIENT
Start: 2019-03-20 | End: 2019-03-20 | Stop reason: HOSPADM

## 2019-03-20 RX ORDER — MIDAZOLAM HYDROCHLORIDE 1 MG/ML
2 INJECTION INTRAMUSCULAR; INTRAVENOUS
Status: COMPLETED | OUTPATIENT
Start: 2019-03-20 | End: 2019-03-20

## 2019-03-20 RX ORDER — DIPHENHYDRAMINE HYDROCHLORIDE 50 MG/ML
12.5 INJECTION INTRAMUSCULAR; INTRAVENOUS
Status: DISCONTINUED | OUTPATIENT
Start: 2019-03-20 | End: 2019-03-20 | Stop reason: HOSPADM

## 2019-03-20 RX ORDER — SCOLOPAMINE TRANSDERMAL SYSTEM 1 MG/1
1 PATCH, EXTENDED RELEASE TRANSDERMAL ONCE
Status: DISCONTINUED | OUTPATIENT
Start: 2019-03-20 | End: 2019-03-20 | Stop reason: HOSPADM

## 2019-03-20 RX ORDER — METOCLOPRAMIDE HYDROCHLORIDE 5 MG/ML
10 INJECTION INTRAMUSCULAR; INTRAVENOUS
Status: DISCONTINUED | OUTPATIENT
Start: 2019-03-20 | End: 2019-03-20 | Stop reason: HOSPADM

## 2019-03-20 RX ORDER — SODIUM CHLORIDE 0.9 % (FLUSH) 0.9 %
10 SYRINGE (ML) INJECTION EVERY 12 HOURS SCHEDULED
Status: DISCONTINUED | OUTPATIENT
Start: 2019-03-20 | End: 2019-03-20 | Stop reason: HOSPADM

## 2019-03-20 RX ORDER — HYDRALAZINE HYDROCHLORIDE 20 MG/ML
5 INJECTION INTRAMUSCULAR; INTRAVENOUS EVERY 10 MIN PRN
Status: DISCONTINUED | OUTPATIENT
Start: 2019-03-20 | End: 2019-03-20 | Stop reason: HOSPADM

## 2019-03-20 RX ORDER — ROCURONIUM BROMIDE 10 MG/ML
INJECTION, SOLUTION INTRAVENOUS PRN
Status: DISCONTINUED | OUTPATIENT
Start: 2019-03-20 | End: 2019-03-20 | Stop reason: SDUPTHER

## 2019-03-20 RX ORDER — PROPOFOL 10 MG/ML
INJECTION, EMULSION INTRAVENOUS PRN
Status: DISCONTINUED | OUTPATIENT
Start: 2019-03-20 | End: 2019-03-20 | Stop reason: SDUPTHER

## 2019-03-20 RX ORDER — VANCOMYCIN HYDROCHLORIDE 1 G/20ML
INJECTION, POWDER, LYOPHILIZED, FOR SOLUTION INTRAVENOUS PRN
Status: DISCONTINUED | OUTPATIENT
Start: 2019-03-20 | End: 2019-03-20 | Stop reason: ALTCHOICE

## 2019-03-20 RX ORDER — FENTANYL CITRATE 50 UG/ML
50 INJECTION, SOLUTION INTRAMUSCULAR; INTRAVENOUS
Status: DISCONTINUED | OUTPATIENT
Start: 2019-03-20 | End: 2019-03-20 | Stop reason: HOSPADM

## 2019-03-20 RX ORDER — OXYCODONE HYDROCHLORIDE 5 MG/1
5 TABLET ORAL PRN
Status: DISCONTINUED | OUTPATIENT
Start: 2019-03-20 | End: 2019-03-20 | Stop reason: HOSPADM

## 2019-03-20 RX ORDER — DEXAMETHASONE SODIUM PHOSPHATE 10 MG/ML
INJECTION INTRAMUSCULAR; INTRAVENOUS PRN
Status: DISCONTINUED | OUTPATIENT
Start: 2019-03-20 | End: 2019-03-20 | Stop reason: SDUPTHER

## 2019-03-20 RX ORDER — FENTANYL CITRATE 50 UG/ML
INJECTION, SOLUTION INTRAMUSCULAR; INTRAVENOUS PRN
Status: DISCONTINUED | OUTPATIENT
Start: 2019-03-20 | End: 2019-03-20 | Stop reason: SDUPTHER

## 2019-03-20 RX ORDER — ONDANSETRON 2 MG/ML
INJECTION INTRAMUSCULAR; INTRAVENOUS PRN
Status: DISCONTINUED | OUTPATIENT
Start: 2019-03-20 | End: 2019-03-20 | Stop reason: SDUPTHER

## 2019-03-20 RX ORDER — SODIUM CHLORIDE, SODIUM LACTATE, POTASSIUM CHLORIDE, CALCIUM CHLORIDE 600; 310; 30; 20 MG/100ML; MG/100ML; MG/100ML; MG/100ML
INJECTION, SOLUTION INTRAVENOUS CONTINUOUS
Status: DISCONTINUED | OUTPATIENT
Start: 2019-03-20 | End: 2019-03-20 | Stop reason: HOSPADM

## 2019-03-20 RX ORDER — LIDOCAINE HYDROCHLORIDE 10 MG/ML
INJECTION, SOLUTION EPIDURAL; INFILTRATION; INTRACAUDAL; PERINEURAL PRN
Status: DISCONTINUED | OUTPATIENT
Start: 2019-03-20 | End: 2019-03-20 | Stop reason: ALTCHOICE

## 2019-03-20 RX ORDER — PROMETHAZINE HYDROCHLORIDE 25 MG/ML
6.25 INJECTION, SOLUTION INTRAMUSCULAR; INTRAVENOUS
Status: DISCONTINUED | OUTPATIENT
Start: 2019-03-20 | End: 2019-03-20 | Stop reason: HOSPADM

## 2019-03-20 RX ORDER — LIDOCAINE HYDROCHLORIDE 10 MG/ML
1 INJECTION, SOLUTION EPIDURAL; INFILTRATION; INTRACAUDAL; PERINEURAL
Status: COMPLETED | OUTPATIENT
Start: 2019-03-20 | End: 2019-03-20

## 2019-03-20 RX ORDER — ENALAPRILAT 2.5 MG/2ML
1.25 INJECTION INTRAVENOUS
Status: DISCONTINUED | OUTPATIENT
Start: 2019-03-20 | End: 2019-03-20 | Stop reason: HOSPADM

## 2019-03-20 RX ADMIN — SODIUM CHLORIDE, SODIUM LACTATE, POTASSIUM CHLORIDE, AND CALCIUM CHLORIDE: 600; 310; 30; 20 INJECTION, SOLUTION INTRAVENOUS at 16:52

## 2019-03-20 RX ADMIN — SODIUM CHLORIDE, SODIUM LACTATE, POTASSIUM CHLORIDE, AND CALCIUM CHLORIDE: 600; 310; 30; 20 INJECTION, SOLUTION INTRAVENOUS at 15:52

## 2019-03-20 RX ADMIN — ONDANSETRON HYDROCHLORIDE 4 MG: 2 INJECTION, SOLUTION INTRAMUSCULAR; INTRAVENOUS at 17:19

## 2019-03-20 RX ADMIN — SUGAMMADEX 220 MG: 100 INJECTION, SOLUTION INTRAVENOUS at 17:22

## 2019-03-20 RX ADMIN — ROCURONIUM BROMIDE 20 MG: 10 INJECTION INTRAVENOUS at 16:58

## 2019-03-20 RX ADMIN — LIDOCAINE HYDROCHLORIDE 5 ML: 10 INJECTION, SOLUTION EPIDURAL; INFILTRATION; INTRACAUDAL; PERINEURAL at 15:52

## 2019-03-20 RX ADMIN — MIDAZOLAM 2 MG: 1 INJECTION INTRAMUSCULAR; INTRAVENOUS at 14:55

## 2019-03-20 RX ADMIN — FENTANYL CITRATE 100 MCG: 50 INJECTION INTRAMUSCULAR; INTRAVENOUS at 15:54

## 2019-03-20 RX ADMIN — HYDROMORPHONE HYDROCHLORIDE 0.5 MG: 1 INJECTION, SOLUTION INTRAMUSCULAR; INTRAVENOUS; SUBCUTANEOUS at 17:25

## 2019-03-20 RX ADMIN — WATER 2 G: 1 INJECTION INTRAMUSCULAR; INTRAVENOUS; SUBCUTANEOUS at 16:07

## 2019-03-20 RX ADMIN — DEXAMETHASONE SODIUM PHOSPHATE 10 MG: 10 INJECTION INTRAMUSCULAR; INTRAVENOUS at 16:31

## 2019-03-20 RX ADMIN — ROCURONIUM BROMIDE 50 MG: 10 INJECTION INTRAVENOUS at 15:56

## 2019-03-20 RX ADMIN — PROPOFOL 160 MG: 10 INJECTION, EMULSION INTRAVENOUS at 15:54

## 2019-03-20 ASSESSMENT — PAIN SCALES - GENERAL
PAINLEVEL_OUTOF10: 0

## 2019-03-20 ASSESSMENT — LIFESTYLE VARIABLES: SMOKING_STATUS: 0

## 2019-03-22 ENCOUNTER — ANTI-COAG VISIT (OUTPATIENT)
Dept: CARDIOLOGY | Age: 74
End: 2019-03-22

## 2019-03-22 LAB — INR BLD: 1

## 2019-03-25 ENCOUNTER — ANTI-COAG VISIT (OUTPATIENT)
Dept: CARDIOLOGY | Age: 74
End: 2019-03-25

## 2019-03-25 LAB — INR BLD: 1.3

## 2019-03-28 ENCOUNTER — ANTI-COAG VISIT (OUTPATIENT)
Dept: CARDIOLOGY | Age: 74
End: 2019-03-28

## 2019-03-28 ENCOUNTER — HOSPITAL ENCOUNTER (INPATIENT)
Age: 74
LOS: 5 days | Discharge: HOME | DRG: 537 | End: 2019-04-03
Attending: EMERGENCY MEDICINE | Admitting: FAMILY MEDICINE
Payer: MEDICARE

## 2019-03-28 DIAGNOSIS — M79.604 ACUTE PAIN OF RIGHT LOWER EXTREMITY: ICD-10-CM

## 2019-03-28 DIAGNOSIS — Z79.01 CHRONIC ANTICOAGULATION: ICD-10-CM

## 2019-03-28 DIAGNOSIS — I48.91 ATRIAL FIBRILLATION WITH RVR (HCC): Primary | ICD-10-CM

## 2019-03-28 LAB
ALBUMIN SERPL-MCNC: 3.8 G/DL (ref 3.5–5.2)
ALP BLD-CCNC: 82 U/L (ref 40–130)
ALT SERPL-CCNC: 74 U/L (ref 5–41)
ANION GAP SERPL CALCULATED.3IONS-SCNC: 11 MMOL/L (ref 7–19)
APTT: 67.6 SEC (ref 26–36.2)
AST SERPL-CCNC: 42 U/L (ref 5–40)
BASOPHILS ABSOLUTE: 0 K/UL (ref 0–0.2)
BASOPHILS RELATIVE PERCENT: 0.2 % (ref 0–1)
BILIRUB SERPL-MCNC: 0.5 MG/DL (ref 0.2–1.2)
BUN BLDV-MCNC: 18 MG/DL (ref 8–23)
CALCIUM SERPL-MCNC: 8.7 MG/DL (ref 8.8–10.2)
CHLORIDE BLD-SCNC: 102 MMOL/L (ref 98–111)
CO2: 23 MMOL/L (ref 22–29)
CREAT SERPL-MCNC: 0.9 MG/DL (ref 0.5–1.2)
EOSINOPHILS ABSOLUTE: 0.1 K/UL (ref 0–0.6)
EOSINOPHILS RELATIVE PERCENT: 1.5 % (ref 0–5)
GFR NON-AFRICAN AMERICAN: >60
GLUCOSE BLD-MCNC: 128 MG/DL (ref 74–109)
HCT VFR BLD CALC: 37 % (ref 42–52)
HEMOGLOBIN: 11.9 G/DL (ref 14–18)
INR BLD: 2.4
INR BLD: 2.76 (ref 0.88–1.18)
LYMPHOCYTES ABSOLUTE: 1.6 K/UL (ref 1.1–4.5)
LYMPHOCYTES RELATIVE PERCENT: 16.2 % (ref 20–40)
MCH RBC QN AUTO: 32.7 PG (ref 27–31)
MCHC RBC AUTO-ENTMCNC: 32.2 G/DL (ref 33–37)
MCV RBC AUTO: 101.6 FL (ref 80–94)
MONOCYTES ABSOLUTE: 1.2 K/UL (ref 0–0.9)
MONOCYTES RELATIVE PERCENT: 12.8 % (ref 0–10)
NEUTROPHILS ABSOLUTE: 6.6 K/UL (ref 1.5–7.5)
NEUTROPHILS RELATIVE PERCENT: 68.7 % (ref 50–65)
PDW BLD-RTO: 12.7 % (ref 11.5–14.5)
PLATELET # BLD: 154 K/UL (ref 130–400)
PMV BLD AUTO: 9.3 FL (ref 9.4–12.4)
POTASSIUM SERPL-SCNC: 4.5 MMOL/L (ref 3.5–5)
PROTHROMBIN TIME: 28.4 SEC (ref 12–14.6)
RBC # BLD: 3.64 M/UL (ref 4.7–6.1)
SODIUM BLD-SCNC: 136 MMOL/L (ref 136–145)
TOTAL PROTEIN: 6.7 G/DL (ref 6.6–8.7)
WBC # BLD: 9.7 K/UL (ref 4.8–10.8)

## 2019-03-28 PROCEDURE — 85730 THROMBOPLASTIN TIME PARTIAL: CPT

## 2019-03-28 PROCEDURE — 6360000002 HC RX W HCPCS: Performed by: EMERGENCY MEDICINE

## 2019-03-28 PROCEDURE — 85610 PROTHROMBIN TIME: CPT

## 2019-03-28 PROCEDURE — 80053 COMPREHEN METABOLIC PANEL: CPT

## 2019-03-28 PROCEDURE — 84484 ASSAY OF TROPONIN QUANT: CPT

## 2019-03-28 PROCEDURE — 96374 THER/PROPH/DIAG INJ IV PUSH: CPT

## 2019-03-28 PROCEDURE — 99285 EMERGENCY DEPT VISIT HI MDM: CPT

## 2019-03-28 PROCEDURE — 36415 COLL VENOUS BLD VENIPUNCTURE: CPT

## 2019-03-28 PROCEDURE — 85025 COMPLETE CBC W/AUTO DIFF WBC: CPT

## 2019-03-28 PROCEDURE — 93005 ELECTROCARDIOGRAM TRACING: CPT

## 2019-03-28 PROCEDURE — 93971 EXTREMITY STUDY: CPT

## 2019-03-28 PROCEDURE — 82550 ASSAY OF CK (CPK): CPT

## 2019-03-28 RX ADMIN — HYDROMORPHONE HYDROCHLORIDE 1 MG: 1 INJECTION, SOLUTION INTRAMUSCULAR; INTRAVENOUS; SUBCUTANEOUS at 21:40

## 2019-03-28 ASSESSMENT — PAIN SCALES - GENERAL
PAINLEVEL_OUTOF10: 9
PAINLEVEL_OUTOF10: 8
PAINLEVEL_OUTOF10: 8

## 2019-03-28 NOTE — LETTER
Beneficiary Notification Letter     This [facility] Provider is Participating in an Innovative Payment and [address] from Macy Sofia:   JAVAD ALFONSO is participating in a Medicare initiative called the [facility] for [facility]. You are receiving this letter because your health care provider has identified you as a patient who is receiving care through this initiative. Health care providers participating in the [facility], including JAVAD ALFONSO, will work with Medicare to improve care for patients. Your Medicare rights have not been changed. You still have all the same Medicare rights and protections, including the right to choose which hospital, doctor, or other health care provider you see. However, because JAVAD ALFONSO chose to participate in the [address] [place] [address], all Medicare beneficiaries who meet the eligibility criteria of this initiative will receive care under the initiative. If you do not wish to receive care under the Bundled Payments [facility], you must choose a health care provider that does not participate in this initiative for your care. Regardless of which health care provider you see, Medicare will continue to cover all of your medically necessary services. Bundled Payments for Care Improvement Advanced aims to help improve your care     The Bundled Payments [facility] is an innovative Medicare initiative that encourages your doctors, hospitals, and other health care providers to work more closely together so you get better care during and following certain hospital stays.  In this initiative, doctors and hospitals may work closely with certain health care providers and suppliers that help patients recover after discharge from the hospital, including skilled nursing facilities, home health agencies, inpatient rehabilitation facilities, and long term care hospitals. [facility] is working closely with the doctors and other health care providers that care for you during and following your hospital stay and for a period of time after you leave the hospital. By working together, the health care providers are trying to more efficiently provide well-managed, high quality, patient-centered care as you undergo treatment. Hospitals, doctors, and other health care providers that care for you following a hospital stay may receive an additional payment for providing better, more coordinated health care. Medicare will monitor your care to make sure you and others get high quality care. Your feedback is important     Medicare may also ask you to answer a survey about the services and care you received from [address] will be mailed to you. Your feedback will improve care for all people with Medicare who receive care from [facility]. Completion of this survey is optional.     Get more information     For more information about the Bundled Payments for [facility], you can:    · Visit the CMS BPCI Advanced Website at http://lopez-rice.net/ initiatives/bpci-advanced   · Call the [facility] team at (698) 241-3929. · Call 1-800-MEDICARE (4-215.848.2466). TTY users can call 5-902.196.7930     If you have concerns or complaints about your care, talk to your health care provider, or contact your Beneficiary and Family Centered Quality Improvement Organization [facility]). To get your [facility]'s phone number, visit Medicare.gov/contacts or call 1-800-MEDICARE. · To find a different hospital, visit www. hospitalcompare.[facility].gov or call 1-800Workfolio MEDICARE (1-952.405.8376). TTY users should call 8-461.588.9268. · To find a different doctor, visit Medicare's Physician Compare website, HDTapes.co.nz, or call 1-800-MEDICARE (297 0060). TTY users should call 8-258.157.2095. · To find a different skilled nursing facility, visit [facility] [address] website, https://www.[employer]/, or call 1-800-MEDICARE (1- 313.409.8407). TTY users should call 1-278.864.7206. · To find a different long term care hospital, visit [facility] Compare website, SmFreshDigitalGrouplog6sicuro.it.be, or call 1-800- MEDICARE (667 7929). TTY users should call 7-518.990.7697. · To find a different inpatient rehabilitation facility, visit [facility] Compare website, www.medicare.gov/ inpatientrehabilitation facilitycompare, or call 1-800-MEDICARE (3-681.979.1644). TTY users should call 9- 593.243.5697. · To find a different home health agency, visit [facility] website, www.medicare.gov/homehealthcompare, or call 1-800-MEDICARE (5-454- 183-7549). TTY users should call 1-142.302.1542.

## 2019-03-29 ENCOUNTER — APPOINTMENT (OUTPATIENT)
Dept: CT IMAGING | Age: 74
DRG: 537 | End: 2019-03-29
Payer: MEDICARE

## 2019-03-29 PROBLEM — I48.91 ATRIAL FIBRILLATION WITH RVR (HCC): Status: ACTIVE | Noted: 2019-03-29

## 2019-03-29 LAB
ANION GAP SERPL CALCULATED.3IONS-SCNC: 15 MMOL/L (ref 7–19)
BUN BLDV-MCNC: 19 MG/DL (ref 8–23)
CALCIUM SERPL-MCNC: 8.2 MG/DL (ref 8.8–10.2)
CHLORIDE BLD-SCNC: 102 MMOL/L (ref 98–111)
CO2: 19 MMOL/L (ref 22–29)
CREAT SERPL-MCNC: 0.9 MG/DL (ref 0.5–1.2)
EKG P AXIS: NORMAL DEGREES
EKG P-R INTERVAL: NORMAL MS
EKG Q-T INTERVAL: 298 MS
EKG QRS DURATION: 100 MS
EKG QTC CALCULATION (BAZETT): 422 MS
EKG T AXIS: 139 DEGREES
GFR NON-AFRICAN AMERICAN: >60
GLUCOSE BLD-MCNC: 178 MG/DL (ref 74–109)
HCT VFR BLD CALC: 33 % (ref 42–52)
HEMOGLOBIN: 10.9 G/DL (ref 14–18)
INR BLD: 2.49 (ref 0.88–1.18)
MAGNESIUM: 2.2 MG/DL (ref 1.6–2.4)
MCH RBC QN AUTO: 33.6 PG (ref 27–31)
MCHC RBC AUTO-ENTMCNC: 33 G/DL (ref 33–37)
MCV RBC AUTO: 101.9 FL (ref 80–94)
PDW BLD-RTO: 12.8 % (ref 11.5–14.5)
PLATELET # BLD: 162 K/UL (ref 130–400)
PMV BLD AUTO: 9.3 FL (ref 9.4–12.4)
POTASSIUM SERPL-SCNC: 4.4 MMOL/L (ref 3.5–5)
PROTHROMBIN TIME: 26.1 SEC (ref 12–14.6)
RBC # BLD: 3.24 M/UL (ref 4.7–6.1)
SODIUM BLD-SCNC: 136 MMOL/L (ref 136–145)
TOTAL CK: 67 U/L (ref 39–308)
TROPONIN: <0.01 NG/ML (ref 0–0.03)
WBC # BLD: 10.7 K/UL (ref 4.8–10.8)

## 2019-03-29 PROCEDURE — 80048 BASIC METABOLIC PNL TOTAL CA: CPT

## 2019-03-29 PROCEDURE — 2580000003 HC RX 258: Performed by: INTERNAL MEDICINE

## 2019-03-29 PROCEDURE — 6360000002 HC RX W HCPCS: Performed by: EMERGENCY MEDICINE

## 2019-03-29 PROCEDURE — 96376 TX/PRO/DX INJ SAME DRUG ADON: CPT

## 2019-03-29 PROCEDURE — 83735 ASSAY OF MAGNESIUM: CPT

## 2019-03-29 PROCEDURE — 84484 ASSAY OF TROPONIN QUANT: CPT

## 2019-03-29 PROCEDURE — 85027 COMPLETE CBC AUTOMATED: CPT

## 2019-03-29 PROCEDURE — 2140000000 HC CCU INTERMEDIATE R&B

## 2019-03-29 PROCEDURE — 85610 PROTHROMBIN TIME: CPT

## 2019-03-29 PROCEDURE — 6370000000 HC RX 637 (ALT 250 FOR IP): Performed by: INTERNAL MEDICINE

## 2019-03-29 PROCEDURE — 6370000000 HC RX 637 (ALT 250 FOR IP): Performed by: SURGERY

## 2019-03-29 PROCEDURE — 74176 CT ABD & PELVIS W/O CONTRAST: CPT

## 2019-03-29 PROCEDURE — 99221 1ST HOSP IP/OBS SF/LOW 40: CPT | Performed by: SURGERY

## 2019-03-29 PROCEDURE — 96375 TX/PRO/DX INJ NEW DRUG ADDON: CPT

## 2019-03-29 PROCEDURE — 6360000002 HC RX W HCPCS: Performed by: HOSPITALIST

## 2019-03-29 PROCEDURE — 2500000003 HC RX 250 WO HCPCS: Performed by: EMERGENCY MEDICINE

## 2019-03-29 PROCEDURE — 73700 CT LOWER EXTREMITY W/O DYE: CPT

## 2019-03-29 PROCEDURE — 6370000000 HC RX 637 (ALT 250 FOR IP): Performed by: PHYSICIAN ASSISTANT

## 2019-03-29 PROCEDURE — 99285 EMERGENCY DEPT VISIT HI MDM: CPT | Performed by: EMERGENCY MEDICINE

## 2019-03-29 PROCEDURE — 36415 COLL VENOUS BLD VENIPUNCTURE: CPT

## 2019-03-29 PROCEDURE — 2580000003 HC RX 258: Performed by: EMERGENCY MEDICINE

## 2019-03-29 PROCEDURE — 99222 1ST HOSP IP/OBS MODERATE 55: CPT | Performed by: INTERNAL MEDICINE

## 2019-03-29 PROCEDURE — 2700000000 HC OXYGEN THERAPY PER DAY

## 2019-03-29 RX ORDER — ROSUVASTATIN CALCIUM 10 MG/1
10 TABLET, COATED ORAL NIGHTLY
Status: DISCONTINUED | OUTPATIENT
Start: 2019-03-29 | End: 2019-04-03 | Stop reason: HOSPADM

## 2019-03-29 RX ORDER — ONDANSETRON 2 MG/ML
4 INJECTION INTRAMUSCULAR; INTRAVENOUS EVERY 6 HOURS PRN
Status: DISCONTINUED | OUTPATIENT
Start: 2019-03-29 | End: 2019-04-03 | Stop reason: HOSPADM

## 2019-03-29 RX ORDER — PREDNISONE 10 MG/1
20 TABLET ORAL 2 TIMES DAILY
Status: DISCONTINUED | OUTPATIENT
Start: 2019-03-29 | End: 2019-03-30

## 2019-03-29 RX ORDER — FLUOXETINE HYDROCHLORIDE 20 MG/1
60 CAPSULE ORAL DAILY
Status: DISCONTINUED | OUTPATIENT
Start: 2019-03-29 | End: 2019-04-03 | Stop reason: HOSPADM

## 2019-03-29 RX ORDER — METOPROLOL SUCCINATE 25 MG/1
25 TABLET, EXTENDED RELEASE ORAL DAILY
Status: DISCONTINUED | OUTPATIENT
Start: 2019-03-29 | End: 2019-04-03 | Stop reason: HOSPADM

## 2019-03-29 RX ORDER — SODIUM CHLORIDE 0.9 % (FLUSH) 0.9 %
10 SYRINGE (ML) INJECTION EVERY 12 HOURS SCHEDULED
Status: DISCONTINUED | OUTPATIENT
Start: 2019-03-29 | End: 2019-04-03 | Stop reason: HOSPADM

## 2019-03-29 RX ORDER — ACETAMINOPHEN 325 MG/1
650 TABLET ORAL EVERY 8 HOURS PRN
Status: DISCONTINUED | OUTPATIENT
Start: 2019-03-29 | End: 2019-04-03 | Stop reason: HOSPADM

## 2019-03-29 RX ORDER — OXYCODONE AND ACETAMINOPHEN 7.5; 325 MG/1; MG/1
1 TABLET ORAL EVERY 6 HOURS PRN
Status: DISCONTINUED | OUTPATIENT
Start: 2019-03-29 | End: 2019-04-03 | Stop reason: HOSPADM

## 2019-03-29 RX ORDER — SODIUM CHLORIDE 0.9 % (FLUSH) 0.9 %
10 SYRINGE (ML) INJECTION PRN
Status: DISCONTINUED | OUTPATIENT
Start: 2019-03-29 | End: 2019-04-03 | Stop reason: HOSPADM

## 2019-03-29 RX ORDER — DILTIAZEM HYDROCHLORIDE 5 MG/ML
5 INJECTION INTRAVENOUS ONCE
Status: COMPLETED | OUTPATIENT
Start: 2019-03-29 | End: 2019-03-29

## 2019-03-29 RX ORDER — PREGABALIN 50 MG/1
100 CAPSULE ORAL 2 TIMES DAILY
Status: DISCONTINUED | OUTPATIENT
Start: 2019-03-29 | End: 2019-04-03 | Stop reason: HOSPADM

## 2019-03-29 RX ORDER — DIPHENHYDRAMINE HCL 25 MG
25 TABLET ORAL 2 TIMES DAILY
Status: DISCONTINUED | OUTPATIENT
Start: 2019-03-29 | End: 2019-03-30

## 2019-03-29 RX ORDER — FAMOTIDINE 20 MG/1
20 TABLET, FILM COATED ORAL 2 TIMES DAILY
Status: COMPLETED | OUTPATIENT
Start: 2019-03-29 | End: 2019-03-31

## 2019-03-29 RX ADMIN — Medication 10 ML: at 20:05

## 2019-03-29 RX ADMIN — ROSUVASTATIN CALCIUM 10 MG: 10 TABLET, FILM COATED ORAL at 20:03

## 2019-03-29 RX ADMIN — HYDROMORPHONE HYDROCHLORIDE 0.5 MG: 1 INJECTION, SOLUTION INTRAMUSCULAR; INTRAVENOUS; SUBCUTANEOUS at 17:26

## 2019-03-29 RX ADMIN — FLUOXETINE 60 MG: 20 CAPSULE ORAL at 08:10

## 2019-03-29 RX ADMIN — DILTIAZEM HYDROCHLORIDE 5 MG: 5 INJECTION INTRAVENOUS at 00:25

## 2019-03-29 RX ADMIN — FAMOTIDINE 20 MG: 20 TABLET ORAL at 17:26

## 2019-03-29 RX ADMIN — DIPHENHYDRAMINE HCL 25 MG: 25 TABLET ORAL at 17:26

## 2019-03-29 RX ADMIN — PREGABALIN 100 MG: 50 CAPSULE ORAL at 20:03

## 2019-03-29 RX ADMIN — Medication 10 ML: at 08:11

## 2019-03-29 RX ADMIN — PREGABALIN 100 MG: 50 CAPSULE ORAL at 11:45

## 2019-03-29 RX ADMIN — OXYCODONE HYDROCHLORIDE AND ACETAMINOPHEN 1 TABLET: 7.5; 325 TABLET ORAL at 20:04

## 2019-03-29 RX ADMIN — HYDROMORPHONE HYDROCHLORIDE 0.5 MG: 1 INJECTION, SOLUTION INTRAMUSCULAR; INTRAVENOUS; SUBCUTANEOUS at 11:39

## 2019-03-29 RX ADMIN — METOPROLOL SUCCINATE 25 MG: 25 TABLET, EXTENDED RELEASE ORAL at 08:53

## 2019-03-29 RX ADMIN — DILTIAZEM HYDROCHLORIDE 5 MG/HR: 5 INJECTION INTRAVENOUS at 00:26

## 2019-03-29 RX ADMIN — OXYCODONE HYDROCHLORIDE AND ACETAMINOPHEN 1 TABLET: 7.5; 325 TABLET ORAL at 13:03

## 2019-03-29 RX ADMIN — PREDNISONE 20 MG: 10 TABLET ORAL at 17:26

## 2019-03-29 RX ADMIN — HYDROMORPHONE HYDROCHLORIDE 1 MG: 1 INJECTION, SOLUTION INTRAMUSCULAR; INTRAVENOUS; SUBCUTANEOUS at 00:25

## 2019-03-29 RX ADMIN — OXYCODONE HYDROCHLORIDE AND ACETAMINOPHEN 1 TABLET: 7.5; 325 TABLET ORAL at 06:11

## 2019-03-29 ASSESSMENT — ENCOUNTER SYMPTOMS
ABDOMINAL PAIN: 0
BLURRED VISION: 0
SPUTUM PRODUCTION: 0
CONSTIPATION: 0
PHOTOPHOBIA: 0
VOMITING: 0
DIARRHEA: 0
NAUSEA: 0
WHEEZING: 0
COUGH: 0
DOUBLE VISION: 0
EYE PAIN: 0
SHORTNESS OF BREATH: 0
ORTHOPNEA: 0
HEMOPTYSIS: 0
HEARTBURN: 0
CHEST TIGHTNESS: 0

## 2019-03-29 ASSESSMENT — PAIN SCALES - GENERAL
PAINLEVEL_OUTOF10: 10
PAINLEVEL_OUTOF10: 8
PAINLEVEL_OUTOF10: 0
PAINLEVEL_OUTOF10: 0
PAINLEVEL_OUTOF10: 2
PAINLEVEL_OUTOF10: 6
PAINLEVEL_OUTOF10: 8

## 2019-03-29 NOTE — ED PROVIDER NOTES
[facility]  eMERGENCY dEPARTMENT eNCOUnter      Pt Name: Mp Rushing  MRN: [id number]  [place] 1945  Date of evaluation: 3/28/2019  Provider: Angelo Champion MD    [address] [place] [address]       Chief Complaint   Patient presents with    Post-op Problem     had pain stimulator placed one week ago, here, developed right thigh pain yesterday, unable to walk  on right leg, painful, thinks he may have a blood cloot. has hx PE, DVT'S,  has green field filter         HISTORY OF PRESENT ILLNESS   (Location/Symptom, Timing/Onset,Context/Setting, Quality, Duration, Modifying Factors, Severity)  Note limiting factors. Mp Rushing is a 72 y.o. male who presents to the emergency department for evaluation of right lower extremity pain. Patient reports that he recently underwent placement of a pain pump/stimulator by Dr. Joy Smith. States that about 2 days ago he noticed increasing pain in his right lower extremity, this seemed to be worse over the back of his thigh area. With some radiation into his calf. He does report he has a prior history of DVTs and pulmonary embolism with known clotting disorder. He is currently maintained on Coumadin therapy. In preparation for the surgery he was changed over to Lovenox and then postoperatively started his Coumadin back. States that his INR checked performed yesterday revealed an INR of 2.6, Lovenox was subsequently discontinued. He also reports that he has a Deepak filter in place. States that he is having some pain in his low back area that is in mild-to-moderate severity. HPI    NursingNotes were reviewed. REVIEW OF SYSTEMS    (2-9 systems for level 4, 10 or more for level 5)     Review of Systems   Constitutional: Negative for chills and fever. Respiratory: Negative for chest tightness, shortness of breath and wheezing. Cardiovascular: Negative for chest pain and palpitations. Gastrointestinal: Negative for abdominal pain, nausea and vomiting. TABLET    Take 1 tablet by mouth daily. ENOXAPARIN (LOVENOX) 120 MG/0.8ML INJECTION    Inject 0.8 mLs into the skin every 12 hours    FAMOTIDINE (PEPCID) 20 MG TABLET    Take 1 tablet by mouth 2 times daily. FLUOXETINE (PROZAC) 20 MG CAPSULE    Take 3 capsules by mouth daily. METOPROLOL SUCCINATE (TOPROL XL) 25 MG EXTENDED RELEASE TABLET    Take 25 mg by mouth daily    MULTIPLE VITAMINS-MINERALS (MULTIVITAMIN ADULT PO)    Take 1 tablet by mouth daily     NITROGLYCERIN (NITROSTAT) 0.4 MG SL TABLET    Place 1 tablet under the tongue every 5 minutes as needed for Chest pain    OXYCODONE-ACETAMINOPHEN (PERCOCET) 7.5-325 MG PER TABLET    Take 1 tablet by mouth every 6 hours as needed for Pain for up to 14 days. ROSUVASTATIN (CRESTOR) 10 MG TABLET    Take 1 tablet by mouth daily. VITAMIN B-12 (CYANOCOBALAMIN) 1000 MCG TABLET    Take 1,000 mcg by mouth daily.       WARFARIN (COUMADIN) 7.5 MG TABLET    Take 1 tablet by mouth daily       ALLERGIES     Iodine; Morphine; and Shellfish-derived products    FAMILY HISTORY       Family History   Problem Relation Age of Onset    Cancer Mother     Cancer Father           SOCIAL HISTORY       Social History     Socioeconomic History    Marital status:      Spouse name: None    Number of children: None    Years of education: None    Highest education level: None   Occupational History    None   Social Needs    Financial resource strain: None    Food insecurity:     Worry: None     Inability: None    Transportation needs:     Medical: None     Non-medical: None   Tobacco Use    Smoking status: Never Smoker    Smokeless tobacco: Never Used   Substance and Sexual Activity    Alcohol use: Yes     Comment: rarely    Drug use: No    Sexual activity: None   Lifestyle    Physical activity:     Days per week: None     Minutes per session: None    Stress: None   Relationships    Social connections:     Talks on phone: None     Gets together: None components:    aPTT 67.6 (*)     All other components within normal limits    Narrative:     CALL  Monae  KLEGIANCARLO tel. ,  Coag results called to and read back by herman galaviz, 03/28/2019 22:00, by  AXISW   TROPONIN       All other labs were within normal range or not returned as of this dictation. EMERGENCY DEPARTMENT COURSE and DIFFERENTIAL DIAGNOSIS/MDM:   Vitals:    Vitals:    03/28/19 2301 03/28/19 2359 03/29/19 0010 03/29/19 0032   BP: 129/82 92/71 98/71 116/79   Pulse: 138 142 157 147   Resp: 18 18 20 20   Temp:       TempSrc:       SpO2: 92% 92% 91% 92%   Weight:       Height:           MDM  Number of Diagnoses or Management Options  Acute pain of right lower extremity: new and requires workup  Atrial fibrillation with RVR ([address]): new and requires workup     Amount and/or Complexity of Data Reviewed  Clinical lab tests: ordered and reviewed  Tests in the radiology section of CPT®: ordered and reviewed  Tests in the medicine section of CPT®: ordered and reviewed  Discuss the patient with other providers: yes  Independent visualization of images, tracings, or specimens: yes    Risk of Complications, Morbidity, and/or Mortality  Presenting problems: high  Diagnostic procedures: high  Management options: high      MDM:  Patient with ongoing pain and subjective swelling involving the right LE. We will plan to obtain a venous ultrasound to exclude DVT. We will also evaluated with laboratory studies and check his INR. Clinical course: Patient developed atrial fibrillation with RVR in the 130s while here in the emergency department. He does have a prior history of atrial fibrillation. Currently running with a ventricular rate in the 160s. We will plan to start him on Cardizem at this time. Patient's lower extremity venous ultrasound is negative for an acute DVT. His INR is therapeutic at 2.7.     CONSULTS:    Case is been reviewed with Dr. Barbara Drake regarding inpatient admission to the hospitalist

## 2019-03-29 NOTE — H&P
Hospital Medicine    History & Physical      CC: pain RLE    History Obtained From:  patient, electronic medical record    HISTORY OF PRESENT ILLNESS:    The patient is a 72 y.o. male who presents to er with 48 hours history of RLE pain, thigh, RLE, pain and decreased mobility, pain is severe constant, he had chronic dvt of rle, has been on warfarin, and recently on lovenox perioperatively he recently had placement spine stimulator. Also found to have a-fib with rvr, he denies cp, or palpitation, started on cardizem gtt. Will admit to pcu.     Past Medical History:        Diagnosis Date    Anxiety 12/11/2017    Atherosclerotic coronary vascular disease     CAD (coronary artery disease)     sees dr. combs    Cervical radiculopathy     Chronic bilateral low back pain without sciatica 3/20/2019    Depression     Diverticular disease     DVT (deep venous thrombosis) ([address]     Erectile dysfunction     GERD (gastroesophageal reflux disease)     Hyperlipidemia     Hypertension     Morbid obesity ([address]     Obesity     OCD (obsessive compulsive disorder)     Paroxysmal atrial fibrillation ([address]     Unspecified sleep apnea     bpap    Unstable angina pectoris ([address]        Past Surgical History:        Procedure Laterality Date    BACK SURGERY      total x 5    CARDIAC SURGERY  2001 and 2004    stents    CARPAL TUNNEL RELEASE      CATARACT REMOVAL  2005    CORONARY ANGIOPLASTY WITH STENT PLACEMENT  12/07/2004    [facility]    CORONARY ARTERY BYPASS GRAFT  2011    [facility] [place]    EYE SURGERY      GASTRIC BYPASS SURGERY  2002    HERNIA REPAIR      IMPLANTATION VAGAL NERVE STIMULATOR N/A 3/20/2019    SPINAL CORD STIMULATOR PERMANENT PLACEMENT performed by Cheyanne Rose DO at [facility]  2006    left total knee    KNEE SURGERY Left 1969    meniscus repair in [address] in   [facility]    lumbar x 2   [facility] cough, hemoptysis, sputum production and shortness of breath. Cardiovascular: Negative for chest pain, palpitations, orthopnea, claudication and leg swelling. Gastrointestinal: Negative for abdominal pain, constipation, diarrhea, heartburn, nausea and vomiting. Genitourinary: Negative for dysuria, frequency and urgency. Musculoskeletal: Positive for joint pain and myalgias. Skin: Negative for itching and rash. Bruises lower abdomen   Neurological: Negative for dizziness, tremors, sensory change, speech change, focal weakness and headaches. Endo/Heme/Allergies: Does not bruise/bleed easily. Psychiatric/Behavioral: Negative for depression and suicidal ideas. PHYSICAL EXAM:  /74   Pulse 72   Temp 97 °F (36.1 °C)   Resp 20   Ht 5' 8\" (1.727 m)   Wt 264 lb 11.2 oz (120.1 kg)   SpO2 98%   BMI 40.25 kg/m²   Physical Exam   Constitutional: He appears well-developed and well-nourished. He is cooperative. Non-toxic appearance. He does not have a sickly appearance. He appears distressed. Distress due to pain   HENT:   Head: Normocephalic and atraumatic. Eyes: Pupils are equal, round, and reactive to light. Conjunctivae and lids are normal. No scleral icterus. Neck: Trachea normal. Neck supple. No JVD present. Carotid bruit is not present. No thyromegaly present. Cardiovascular: An irregular rhythm present. Tachycardia present. Exam reveals no S3. Pulses:       Carotid pulses are 2+ on the right side, and 2+ on the left side. Radial pulses are 2+ on the right side, and 2+ on the left side. Dorsalis pedis pulses are 2+ on the right side, and 2+ on the left side. Pulmonary/Chest: No stridor. No apnea. No respiratory distress. He has no decreased breath sounds. He has no wheezes. He has no rhonchi. He has no rales. Abdominal: Soft. Normal appearance and bowel sounds are normal. He exhibits no distension and no ascites. There is no hepatosplenomegaly.  There is no tenderness. Bruises rl abd   Musculoskeletal:        Left upper leg: He exhibits tenderness, swelling and edema. Right lower leg: He exhibits no swelling. Left lower leg: He exhibits edema. Neurological: He is alert. Skin: Skin is warm and dry. Bruising noted. Bruises rl abdomen   Psychiatric: He has a normal mood and affect. His speech is normal.     DATA:  EKG:  I have reviewed EKG with the following interpretation:  Imaging:    VL Extremity Venous Right                    Labs Reviewed   COMPREHENSIVE METABOLIC PANEL - Abnormal; Notable for the following components:       Result Value    Glucose 128 (*)     Calcium 8.7 (*)     ALT 74 (*)     AST 42 (*)     All other components within normal limits   CBC WITH AUTO DIFFERENTIAL - Abnormal; Notable for the following components:    RBC 3.64 (*)     Hemoglobin 11.9 (*)     Hematocrit 37.0 (*)     .6 (*)     MCH 32.7 (*)     MCHC 32.2 (*)     MPV 9.3 (*)     Neutrophils % 68.7 (*)     Lymphocytes % 16.2 (*)     Monocytes % 12.8 (*)     Monocytes # 1.20 (*)     All other components within normal limits   PROTIME-INR - Abnormal; Notable for the following components:    Protime 28.4 (*)     INR 2.76 (*)     All other components within normal limits    Narrative:     CALL  Monae  KLED tel. ,  Coag results called to and read back by herman galaviz, 03/28/2019 22:00, by  LACEY   APTT - Abnormal; Notable for the following components:    aPTT 67.6 (*)     All other components within normal limits    Narrative:     CALL  Monae  KLED tel. ,  Coag results called to and read back by herman galaviz, 03/28/2019 22:00, by  JUAN DANIEL   TROPONIN   CK          IMPRESSION:    1.  A-fib rvr  2. rle pain and swelling    Patient Active Problem List   Diagnosis    Osteoarthritis of lumbar spine    B12 deficiency    Sleep apnea    Vertigo    Memory loss    Anxiety    Anxiety and depression    Chest pain    Palpitations    Abnormal stress test   

## 2019-03-29 NOTE — PROGRESS NOTES
Rob Lowe arrived to room # 721-2. Presented with: A Fib w/RVR  Mental Status: Patient is oriented, alert, coherent, logical, thought processes intact and able to concentrate and follow conversation. Vitals:    03/29/19 0131   BP: 122/63   Pulse: 83   Resp: 16   Temp: 97.5 °F (36.4 °C)   SpO2: 97%     Patient safety contract and falls prevention contract reviewed with patient Yes. Oriented Patient and Family to room. Call light within reach. Yes.   Needs, issues or concerns expressed at this time: no.      Electronically signed by Sarabjit Fitzpatrick RN on 3/29/2019 at 2:17 AM

## 2019-03-29 NOTE — PROGRESS NOTES
Clinical Pharmacy Note    Army Acevedo is a 72 y.o. male for whom pharmacy has been asked to manage warfarin therapy. Reason for Admission: RLE pain/swelling    Consulting Physician: Dr Jason Chen  Warfarin dose prior to admission: 7.5 mg po daily  Warfarin indication: A-fib  Target INR range: 2-3   Outpatient warfarin provider: Aubree Groves    Past Medical History:   Diagnosis Date    Anxiety 12/11/2017    Atherosclerotic coronary vascular disease     CAD (coronary artery disease)     sees dr. combs    Cervical radiculopathy     Chronic bilateral low back pain without sciatica 3/20/2019    Depression     Diverticular disease     DVT (deep venous thrombosis) (HCC)     Erectile dysfunction     GERD (gastroesophageal reflux disease)     Hyperlipidemia     Hypertension     Morbid obesity ([facility] [address]     Obesity     OCD (obsessive compulsive disorder)     Paroxysmal atrial fibrillation ([facility] [address]     Unspecified sleep apnea     bpap    Unstable angina pectoris ([facility] [address]                 Recent Labs     03/28/19 2113   INR 2.76*     Recent Labs     03/28/19 2113   HGB 11.9*   HCT 37.0*          Current warfarin drug-drug interactions: none        Date INR Warfarin Dose   03/28/19 2.76 7.5 mg                                   Give warfarin 7.5 mg po x 1 dose today. Daily PT/INR until stable within therapeutic range. Thank you for the consult.      Electronically signed by Tita Gonzáles, [facility] on 3/29/2019 at 8:02 AM

## 2019-03-29 NOTE — ED NOTES
Patient placed on 2L o2 via NC after administration of dilaudid  Informed by patient family that he wears cpap at night to sleep.       Omar Hobbs RN  03/29/19 5243

## 2019-03-29 NOTE — PROGRESS NOTES
Vascular Lab Prelim  Duplex venous scan of RLE shows CHRONIC DVT in the distal SFV, and CHRONIC SVT in the SSV. No reflux noted at this time. Final report pending.

## 2019-03-29 NOTE — CONSULTS
Inpatient consult to Orthopedic Surgery  Consult performed by: Chao Terry PA-C  Consult ordered by: Jennifer Eric MD  Reason for consult: Status post spinal cord stimulator placement       [address]: Chao Terry PA-C      Negrita Cruz (1945)  3/29/2019    Reason for Consult: Sciatic pain after spinal cord stimulator implant  Requesting Physician: Elio Banda MD    CHIEF COMPLAINT:  Right leg pain    History Obtained From:  patient, spouse, electronic medical record    HISTORY OF PRESENT ILLNESS:                The patient is a 72 y.o. male who presents with above chief complaint. He underwent percutaneous implantation of a spinal cord stimulator on 3/20/19. The patient tolerated the procedure well but due to his numerous comorbidities he status has been guarded. He reports worsened RLE sciatic pain in the last 72 hours. He has been worked up for prior DVT but this has yielded only chronic findings. He notably denies new onset numbness to his lower extremities, bowel or bladder incontinence, or saddle anesthesia. He has been ambulatory but has increased tenderness with walking.      Past Medical History:        Diagnosis Date    Anxiety 12/11/2017    Atherosclerotic coronary vascular disease     CAD (coronary artery disease)     sees dr. combs    Cervical radiculopathy     Chronic bilateral low back pain without sciatica 3/20/2019    Depression     Diverticular disease     [facility]     Erectile dysfunction     GERD (gastroesophageal reflux disease)     Hyperlipidemia     Hypertension     Morbid obesity ([address]     Obesity     OCD (obsessive compulsive disorder)     [facility]oxysmal [facility] fibrillation ([facility] [address]     Unspecified sleep apnea     bpap    Unstable angina pectoris [facility])        Past Surgical History:        Procedure Laterality Date    BACK SURGERY      total x 5    CARDIAC SURGERY  2001 and 2004    stents  CARPAL TUNNEL RELEASE      CATARACT REMOVAL  2005    CORONARY ANGIOPLASTY WITH STENT PLACEMENT  12/07/2004    in [facility]    CORONARY ARTERY [facility]PASS GRAFT  2011    [facility]    EYE SURGERY      GASTRIC BYPASS SURGERY  2002    HERNIA REPAIR      IMPLANTATION VAGAL NERVE STIMULATOR N/A 3/20/2019    SPINAL CORD STIMULATOR PERMANENT PLACEMENT performed by Paulett Dancer, DO at [address]  2006    left total knee    KNEE SURGERY Left 1969    meniscus repair in [address] in   [facility] and 1997    lumbar x 2   [address]    neck ?  TONSILLECTOMY      TUMOR REMOVAL Right 1973    foot    VENA CAVA FILTER PLACEMENT      WRIST SURGERY         Current Medications:   Current Facility-Administered Medications: [COMPLETED] diltiazem injection 5 mg, 5 mg, Intravenous, Once **FOLLOWED BY** diltiazem 125 mg in dextrose 5 % 125 mL infusion, 5 mg/hr, Intravenous, Continuous  sodium chloride flush 0.9 % injection 10 mL, 10 mL, Intravenous, 2 times per day  sodium chloride flush 0.9 % injection 10 mL, 10 mL, Intravenous, PRN  ondansetron (ZOFRAN) injection 4 mg, 4 mg, Intravenous, Q6H PRN  acetaminophen (TYLENOL) tablet 650 mg, 650 mg, Oral, Q8H PRN  FLUoxetine (PROZAC) capsule 60 mg, 60 mg, Oral, Daily  metoprolol succinate (TOPROL XL) extended release tablet 25 mg, 25 mg, Oral, Daily  oxyCODONE-acetaminophen (PERCOCET) 7.5-325 MG per tablet 1 tablet, 1 tablet, Oral, Q6H PRN  rosuvastatin (CRESTOR) tablet 10 mg, 10 mg, Oral, Nightly  HYDROmorphone (DILAUDID) injection 0.5 mg, 0.5 mg, Intravenous, Q4H PRN  warfarin (COUMADIN) tablet 7.5 mg, 7.5 mg, Oral, Once  warfarin (COUMADIN) daily dosing (placeholder), , Other, RX Placeholder  HYDROmorphone (DILAUDID) injection 0.5 mg, 0.5 mg, Intravenous, Q3H PRN    Allergies:  Iodine; Morphine; and Shellfish-derived products    Social History:   TOBACCO:   reports that he has never smoked.  He has never used smokeless tobacco.  ETOH:   reports that he drinks alcohol. DRUGS:   reports that he does not use drugs. Family History:       Problem Relation Age of Onset   Hadley Dave Cancer Mother     Cancer Father        REVIEW OF SYSTEMS:    14 points completed by myself at time of consultation, notable per the HPI.     PHYSICAL EXAM:      CONSTITUTIONAL:  awake, alert, cooperative and mild distress  EYES:  extra-ocular muscles intact and vision intact  ENT:  normocepalic, without obvious abnormality, atraumatic  NECK:  supple, symmetrical, trachea midline, skin normal and no stridor  BACK:  symmetric and no curvature  LUNGS:  no increased work of breathing, good air exchange and no retractions  CARDIOVASCULAR:  no edema and pulses 2 plus all extermities bilaterally  ABDOMEN:  soft, non-distended and non-tender  MUSCULOSKELETAL:  there is no redness, warmth, or swelling of the joints  full range of motion noted  motor strength is 5 out of 5 all extremities bilaterally  tone is normal  NEUROLOGIC:  Sensate to light touch throughout the upper and lower extremities, no numbness groin region  SKIN:  Ecchymosis present consistent with anticoagulation status, ecchymosis present in incisional areas, light drainage from thoracic incisional area     DATA:    CBC:   Lab Results   Component Value Date    WBC 10.7 03/29/2019    RBC 3.24 03/29/2019    HGB 10.9 03/29/2019    HCT 33.0 03/29/2019    .9 03/29/2019    MCH 33.6 03/29/2019    MCHC 33.0 03/29/2019    RDW 12.8 03/29/2019     03/29/2019    MPV 9.3 03/29/2019     CMP:    Lab Results   Component Value Date     03/29/2019    K 4.4 03/29/2019    K 4.5 01/08/2019     03/29/2019    CO2 19 03/29/2019    BUN 19 03/29/2019    CREATININE 0.9 03/29/2019    LABGLOM >60 03/29/2019    GLUCOSE 178 03/29/2019    PROT 6.7 03/28/2019    LABALBU 3.8 03/28/2019    CALCIUM 8.2 03/29/2019    BILITOT 0.5 03/28/2019    ALKPHOS 82 03/28/2019    AST 42 03/28/2019    ALT 74 03/28/2019       IMPRESSION/RECOMMENDATIONS:    Assessment: Acute sciatica status post spinal cord stimulator placement    Plan: The patient's pain symptoms seem to correlate well with acute sciatica. They are unaware of how to operate the implantable device. I will request that device representative visit patient and instruct on usage. Today, I have redressed the posterior incision. We will start Lyrica for neuropathic pain. There is no evidence of acute neurologic compromise at this time. Plan discussed with patient, family, and day nursing staff.     Electronically signed by Magdi Camarena PA-C on 3/29/19 at 11:17 AM

## 2019-03-29 NOTE — PLAN OF CARE
Vascular:    Patient seen and examined at bedside with . Orders entered for CTA abdomen with runoff to follow Venous phase for Saturday. Orders entered for contrast prep. Agree with above. Two problems:  1. Right posterior thigh hematoma secondary to coumadin/lovenox. Hold coumadin and lovenox   Monitor H/H  2. Swelling right leg likely secondary to hematoma. Chronic partially occlusive DVT and SVT causes baseline chronic edema/postphlebitic syndrome. He has non retrievable IVC filter placed in [place]. Will get CTV abdomen/pelvis to make sure iliac vein and IVC patent. Contrast allergy prep tomorrow, CT sunday    I discussed at length with the patient and his family.

## 2019-03-29 NOTE — ED NOTES
HR noted to be elevated to 125-130.  MD informed, EKG obtained       Kevan Akhtar, RN  03/28/19 0169

## 2019-03-29 NOTE — PROGRESS NOTES
4 Eyes Skin Assessment    Stephanie Dyson is being assessed upon: Admission    I agree that I, Bryanna Mireles, along with Ivette Dumont RN  have performed a thorough Head to Toe Skin Assessment on the patient. ALL assessment sites listed below have been assessed. Areas assessed by both nurses:     [x]   Head, Face, and Ears   [x]   Shoulders, Back, and Chest  [x]   Arms, Elbows, and Hands   [x]   Coccyx, Sacrum, and Ischium  [x]   Legs, Feet, and Heels    Does the Patient have Skin Breakdown? Yes, wound(s) noted upon assessment. It is the responsibility of the Primary Nurse to assure that the following documentation, preventions, orders, and consults are complete on the above noted wound(s): Wound LDA initiated. LDA Flowsheet Documentation includes the Daisy-wound, Wound Assessment, Measurements, Dressing Treatment, Drainage, and Color. Mat Prevention initiated: Yes  Wound Care Orders initiated: No    WOC nurse consulted for Pressure Injury (Stage 3,4, Unstageable, DTI, NWPT, and Complex wounds) and New or Established Ostomies: NA        Primary Nurse eSignature:  Bryanna Mireles RN on 3/29/2019 at 2:18 AM      Co-Signer eSignature: Electronically signed by Janet Maciel RN on 3/29/19 at 2:22 AM

## 2019-03-29 NOTE — ED NOTES
Patient placed on cardiac monitor, continuous pulse oximeter, and NIBP monitor. Monitor alarms on.        Michelle Cabrera RN  03/28/19 5205  Patient placed in a gown     Michelle Cabrera RN  03/28/19 6101

## 2019-03-30 LAB
ANION GAP SERPL CALCULATED.3IONS-SCNC: 13 MMOL/L (ref 7–19)
BUN BLDV-MCNC: 24 MG/DL (ref 8–23)
CALCIUM SERPL-MCNC: 8.4 MG/DL (ref 8.8–10.2)
CHLORIDE BLD-SCNC: 100 MMOL/L (ref 98–111)
CO2: 23 MMOL/L (ref 22–29)
CREAT SERPL-MCNC: 0.8 MG/DL (ref 0.5–1.2)
FERRITIN: 108.5 NG/ML (ref 30–400)
FOLATE: 14.7 NG/ML (ref 4.5–32.2)
GFR NON-AFRICAN AMERICAN: >60
GLUCOSE BLD-MCNC: 141 MG/DL (ref 74–109)
HCT VFR BLD CALC: 25.9 % (ref 42–52)
HCT VFR BLD CALC: 26 % (ref 42–52)
HCT VFR BLD CALC: 27.1 % (ref 42–52)
HEMOGLOBIN: 8.3 G/DL (ref 14–18)
HEMOGLOBIN: 8.5 G/DL (ref 14–18)
INR BLD: 2.38 (ref 0.88–1.18)
IRON SATURATION: 29 % (ref 14–50)
IRON: 87 UG/DL (ref 59–158)
MCH RBC QN AUTO: 33.3 PG (ref 27–31)
MCH RBC QN AUTO: 34 PG (ref 27–31)
MCHC RBC AUTO-ENTMCNC: 31.9 G/DL (ref 33–37)
MCHC RBC AUTO-ENTMCNC: 32.8 G/DL (ref 33–37)
MCV RBC AUTO: 101.6 FL (ref 80–94)
MCV RBC AUTO: 106.6 FL (ref 80–94)
PDW BLD-RTO: 12.8 % (ref 11.5–14.5)
PDW BLD-RTO: 12.9 % (ref 11.5–14.5)
PLATELET # BLD: 154 K/UL (ref 130–400)
PLATELET # BLD: 177 K/UL (ref 130–400)
PMV BLD AUTO: 9.5 FL (ref 9.4–12.4)
PMV BLD AUTO: 9.7 FL (ref 9.4–12.4)
POTASSIUM SERPL-SCNC: 4.1 MMOL/L (ref 3.5–5)
PROTHROMBIN TIME: 25.2 SEC (ref 12–14.6)
RBC # BLD: 2.44 M/UL (ref 4.7–6.1)
RBC # BLD: 2.55 M/UL (ref 4.7–6.1)
RETICULOCYTE ABSOLUTE COUNT: 0.1 M/UL (ref 0.03–0.12)
RETICULOCYTE COUNT PCT: 3.87 % (ref 0.5–1.5)
SODIUM BLD-SCNC: 136 MMOL/L (ref 136–145)
TOTAL IRON BINDING CAPACITY: 296 UG/DL (ref 250–400)
VITAMIN B-12: 795 PG/ML (ref 211–946)
WBC # BLD: 10.4 K/UL (ref 4.8–10.8)
WBC # BLD: 11.5 K/UL (ref 4.8–10.8)

## 2019-03-30 PROCEDURE — 6370000000 HC RX 637 (ALT 250 FOR IP): Performed by: INTERNAL MEDICINE

## 2019-03-30 PROCEDURE — 2140000000 HC CCU INTERMEDIATE R&B

## 2019-03-30 PROCEDURE — 6370000000 HC RX 637 (ALT 250 FOR IP): Performed by: SURGERY

## 2019-03-30 PROCEDURE — 85045 AUTOMATED RETICULOCYTE COUNT: CPT

## 2019-03-30 PROCEDURE — 6370000000 HC RX 637 (ALT 250 FOR IP): Performed by: HOSPITALIST

## 2019-03-30 PROCEDURE — 82728 ASSAY OF FERRITIN: CPT

## 2019-03-30 PROCEDURE — 85610 PROTHROMBIN TIME: CPT

## 2019-03-30 PROCEDURE — 99232 SBSQ HOSP IP/OBS MODERATE 35: CPT | Performed by: HOSPITALIST

## 2019-03-30 PROCEDURE — 2580000003 HC RX 258: Performed by: INTERNAL MEDICINE

## 2019-03-30 PROCEDURE — 83550 IRON BINDING TEST: CPT

## 2019-03-30 PROCEDURE — 36415 COLL VENOUS BLD VENIPUNCTURE: CPT

## 2019-03-30 PROCEDURE — 6370000000 HC RX 637 (ALT 250 FOR IP): Performed by: PHYSICIAN ASSISTANT

## 2019-03-30 PROCEDURE — 83540 ASSAY OF IRON: CPT

## 2019-03-30 PROCEDURE — 82607 VITAMIN B-12: CPT

## 2019-03-30 PROCEDURE — 82746 ASSAY OF FOLIC ACID SERUM: CPT

## 2019-03-30 PROCEDURE — 85027 COMPLETE CBC AUTOMATED: CPT

## 2019-03-30 PROCEDURE — 80048 BASIC METABOLIC PNL TOTAL CA: CPT

## 2019-03-30 PROCEDURE — 6360000002 HC RX W HCPCS: Performed by: HOSPITALIST

## 2019-03-30 RX ORDER — DIPHENHYDRAMINE HCL 25 MG
50 TABLET ORAL ONCE
Status: COMPLETED | OUTPATIENT
Start: 2019-03-31 | End: 2019-03-31

## 2019-03-30 RX ADMIN — FAMOTIDINE 20 MG: 20 TABLET ORAL at 08:40

## 2019-03-30 RX ADMIN — HYDROMORPHONE HYDROCHLORIDE 0.5 MG: 1 INJECTION, SOLUTION INTRAMUSCULAR; INTRAVENOUS; SUBCUTANEOUS at 02:32

## 2019-03-30 RX ADMIN — HYDROMORPHONE HYDROCHLORIDE 0.5 MG: 1 INJECTION, SOLUTION INTRAMUSCULAR; INTRAVENOUS; SUBCUTANEOUS at 07:25

## 2019-03-30 RX ADMIN — PREDNISONE 50 MG: 10 TABLET ORAL at 18:32

## 2019-03-30 RX ADMIN — METOPROLOL SUCCINATE 25 MG: 25 TABLET, EXTENDED RELEASE ORAL at 08:40

## 2019-03-30 RX ADMIN — PREDNISONE 20 MG: 10 TABLET ORAL at 08:40

## 2019-03-30 RX ADMIN — DIPHENHYDRAMINE HCL 25 MG: 25 TABLET ORAL at 08:40

## 2019-03-30 RX ADMIN — Medication 10 ML: at 21:24

## 2019-03-30 RX ADMIN — Medication 10 ML: at 08:42

## 2019-03-30 RX ADMIN — HYDROMORPHONE HYDROCHLORIDE 0.5 MG: 1 INJECTION, SOLUTION INTRAMUSCULAR; INTRAVENOUS; SUBCUTANEOUS at 18:32

## 2019-03-30 RX ADMIN — PREGABALIN 100 MG: 50 CAPSULE ORAL at 21:23

## 2019-03-30 RX ADMIN — HYDROMORPHONE HYDROCHLORIDE 0.5 MG: 1 INJECTION, SOLUTION INTRAMUSCULAR; INTRAVENOUS; SUBCUTANEOUS at 21:23

## 2019-03-30 RX ADMIN — FLUOXETINE 60 MG: 20 CAPSULE ORAL at 08:40

## 2019-03-30 RX ADMIN — ROSUVASTATIN CALCIUM 10 MG: 10 TABLET, FILM COATED ORAL at 21:23

## 2019-03-30 RX ADMIN — PREGABALIN 100 MG: 50 CAPSULE ORAL at 08:40

## 2019-03-30 RX ADMIN — FAMOTIDINE 20 MG: 20 TABLET ORAL at 21:23

## 2019-03-30 ASSESSMENT — ENCOUNTER SYMPTOMS
VOMITING: 0
COUGH: 0
SHORTNESS OF BREATH: 0
CONSTIPATION: 0
SORE THROAT: 0
ABDOMINAL PAIN: 0
WHEEZING: 0
RESPIRATORY NEGATIVE: 1
EYE DISCHARGE: 0
GASTROINTESTINAL NEGATIVE: 1
NAUSEA: 0
BACK PAIN: 0
EYE PAIN: 0
EYES NEGATIVE: 1
EYE REDNESS: 0
DIARRHEA: 0
BLOOD IN STOOL: 0

## 2019-03-30 ASSESSMENT — PAIN SCALES - GENERAL
PAINLEVEL_OUTOF10: 8
PAINLEVEL_OUTOF10: 8
PAINLEVEL_OUTOF10: 7
PAINLEVEL_OUTOF10: 7

## 2019-03-30 NOTE — PROGRESS NOTES
Clinical Pharmacy Note    Warfarin consult follow-up    Recent Labs     03/30/19  0151   INR 2.38*     Recent Labs     03/28/19  2113 03/29/19  0848   HGB 11.9* 10.9*   HCT 37.0* 33.0*    162       Current warfarin drug-drug interactions: prednisone        Date INR Warfarin Dose   03/29/19 2.76(from 03/28) Order discontinued by Nisreen Mccormack   03/30/19 2.38 7.5 mg (ordered by Nisreen Mccormack)                                               Notes:                   Give Coumadin 7.5mg x 1 dose tonight as ordered by Nisreen Mccormack. Daily PT/INR until stable within therapeutic range.      PAUL GARCIA, PHARM D, 3/30/2019, 10:24 AM

## 2019-03-30 NOTE — PLAN OF CARE
Problem: Falls - Risk of:  Goal: Will remain free from falls  Description  Will remain free from falls  Outcome: Ongoing  Goal: Absence of physical injury  Description  Absence of physical injury  Outcome: Ongoing

## 2019-03-30 NOTE — CONSULTS
Consultation     Pt Name: Rob Lowe  MRN: [id number]  YOB: 1945  Date of evaluation: 3/30/2019      REASON FOR CONSULTATION:  GEE Gene Homozygous Mutation, chronic anticoagulation     REQUESTING PHYSICIAN: Dr. Danna Solano    ATTENDING/ADMITTING PHYSICIAN:Abby Wolff, *     History Obtained From:  patient, electronic medical record    HISTORY OF PRESENT ILLNESS:    Mr. Osmin Gil is a pleasant 65-year-old  gentleman who is presently admitted right lower extremity thigh pain with decreased mobility. Reny Gonzalez had pain/stimulator by Dr. Bc Machado placed on 3/20/2019 for his chronic back and bilateral leg pain. He has a history of atrial fibrillation and GEE deficiency requiring chronic anticoagulation therapy with warfarin and has a [place] filter in place. For the procedure he was placed on Lovenox and resumed warfarin with an INR of 2.6 on 3/28/2019. Reny Gonzalez is presently admitted for pain control of his right lower extremity. Venous duplex of RLE on 3/28/2019- Chronic deep vein thrombosis involving the distal femoral and chronic superficial thrombophlebitis in short saphous vein    CT of right femur on 3/29/2019- Severe right hamstring tendinosis with ischiogluteal bursitis. Nonspecific elongated fluid collection in the central right thigh. Intact right femur without fracture or dislocation of the hip or knee joints    Hematology consultation has been requested to establish care.       Past Medical History:    Past Medical History:   Diagnosis Date    Anxiety 12/11/2017    Atherosclerotic coronary vascular disease     CAD (coronary artery disease)     sees dr. combs    Cervical radiculopathy     Chronic bilateral low back pain without sciatica 3/20/2019    Depression     Diverticular disease     DVT (deep venous thrombosis) (HCC)     Erectile dysfunction     GERD (gastroesophageal reflux disease)     Hyperlipidemia     Hypertension     Morbid obesity 10 MG tablet, Take 1 tablet by mouth daily. (Patient taking differently: Take 10 mg by mouth nightly )  vitamin B-12 (CYANOCOBALAMIN) 1000 MCG tablet, Take 1,000 mcg by mouth daily. Calcium Carbonate-Vitamin D (CALCIUM 600+D PO), Take 1 tablet by mouth every morning   cetirizine (ZYRTEC) 10 MG tablet, Take 10 mg by mouth daily  Current Meds:  Scheduled Meds:   sodium chloride flush  10 mL Intravenous 2 times per day    FLUoxetine  60 mg Oral Daily    metoprolol succinate  25 mg Oral Daily    rosuvastatin  10 mg Oral Nightly    warfarin (COUMADIN) daily dosing (placeholder)   Other RX Placeholder    pregabalin  100 mg Oral BID    warfarin  7.5 mg Oral Once    predniSONE  20 mg Oral BID    diphenhydrAMINE  25 mg Oral BID    famotidine  20 mg Oral BID       ContinuousInfusions:   diltiazem (CARDIZEM) 125 mg in dextrose 5% 125 mL infusion Stopped (03/29/19 7459)     PRN Meds:sodium chloride flush, ondansetron, acetaminophen, oxyCODONE-acetaminophen, HYDROmorphone    Allergies:    Iodine; Morphine; and Shellfish-derived products    Social History:    reports that he has never smoked. He has never used smokeless tobacco. He reports that he drinks alcohol. He reports that he does not use drugs.     Family History:   family history includes Cancer in his father and mother. Review of Systems   Constitutional: Positive for fatigue. Negative for chills, diaphoresis and fever. HENT: Negative. Negative for congestion, ear pain, hearing loss, nosebleeds, sore throat and tinnitus. Eyes: Negative. Negative for pain, discharge and redness. Respiratory: Negative. Negative for cough, shortness of breath and wheezing. Cardiovascular: Negative. Negative for chest pain, palpitations and leg swelling. Gastrointestinal: Negative. Negative for abdominal pain, blood in stool, constipation, diarrhea, nausea and vomiting. Endocrine: Negative for polydipsia.    Genitourinary: Negative for dysuria, flank pain, frequency, hematuria and urgency. Musculoskeletal: Negative. Negative for back pain, myalgias and neck pain. RLE pain   Skin: Negative. Negative for rash. Neurological: Negative. Negative for dizziness, tremors, seizures, weakness and headaches. Hematological: Does not bruise/bleed easily. Psychiatric/Behavioral: Negative. The patient is not nervous/anxious. Physical Exam   Constitutional: He is oriented to person, place, and time. He appears well-developed and well-nourished. No distress. HENT:   Head: Normocephalic and atraumatic. Mouth/Throat: Oropharynx is clear and moist.   Eyes: Conjunctivae are normal. Right eye exhibits no discharge. Left eye exhibits no discharge. No scleral icterus. Neck: Normal range of motion. Neck supple. No JVD present. No tracheal deviation present. Cardiovascular: Normal rate, regular rhythm, normal heart sounds and intact distal pulses. Exam reveals no gallop and no friction rub. No murmur heard. Pulmonary/Chest: Effort normal and breath sounds normal. No respiratory distress. He has no wheezes. He has no rales. He exhibits no tenderness. Abdominal: Soft. Bowel sounds are normal. He exhibits no distension and no mass. There is no tenderness. There is no rebound and no guarding. No hernia. Musculoskeletal: He exhibits edema and tenderness. He exhibits no deformity. RLE   Neurological: He is alert and oriented to person, place, and time. No cranial nerve deficit. Coordination normal.   Skin: Skin is warm. No rash noted. He is not diaphoretic. No erythema. No pallor. Ecchymosis to extremities and abdomen   Psychiatric: He has a normal mood and affect. His behavior is normal. Thought content normal.   Nursing note and vitals reviewed.       Vitals:  /68   Pulse 80   Temp 97.2 °F (36.2 °C) (Temporal)   Resp 18   Ht 5' 8\" (1.727 m)   Wt 261 lb 6 oz (118.6 kg)   SpO2 96%   BMI 39.74 kg/m²       LABS:  Lab Results Component Value Date    WBC 10.7 03/29/2019    RBC 3.24 03/29/2019    HGB 10.9 03/29/2019    HCT 33.0 03/29/2019    .9 03/29/2019    MCH 33.6 03/29/2019    MCHC 33.0 03/29/2019    RDW 12.8 03/29/2019     03/29/2019    MPV 9.3 03/29/2019     Lab Results   Component Value Date     03/29/2019    K 4.4 03/29/2019    K 4.5 01/08/2019     03/29/2019    CO2 19 03/29/2019    BUN 19 03/29/2019    CREATININE 0.9 03/29/2019    LABGLOM >60 03/29/2019    GLUCOSE 178 03/29/2019    PROT 6.7 03/28/2019    LABALBU 3.8 03/28/2019    CALCIUM 8.2 03/29/2019    BILITOT 0.5 03/28/2019    ALKPHOS 82 03/28/2019    AST 42 03/28/2019    ALT 74 03/28/2019     Lab Results   Component Value Date    PROTIME 25.2 03/30/2019    INR 2.38 03/30/2019     Recent Labs     03/28/19  2113 03/29/19  0848 03/29/19  1439 03/29/19 2037   CKTOTAL 67  --   --   --    TROPONINI <0.01 <0.01 <0.01 <0.01     Lab Results   Component Value Date    NITRU Negative 02/27/2019    COLORU YELLOW 02/27/2019    PHUR 6.0 02/27/2019    WBCUA 0 02/27/2019    RBCUA 0 02/27/2019    BACTERIA NEGATIVE 02/27/2019    CLARITYU Clear 02/27/2019    SPECGRAV 1.011 02/27/2019    LEUKOCYTESUR Negative 02/27/2019    UROBILINOGEN 0.2 02/27/2019    BILIRUBINUR Negative 02/27/2019    BLOODU SMALL 02/27/2019    GLUCOSEU Negative 02/27/2019    KETUA Negative 02/27/2019     Lab Results   Component Value Date    MG 2.2 03/29/2019     No results found for: LABA1C  Lab Results   Component Value Date    TSH 2.280 01/07/2019    DCZLVWFB53 1271 12/11/2017     Lab Results   Component Value Date    TRIG 101 01/08/2019    HDL 38 01/08/2019    LDLCALC 67 01/08/2019     No results found for: AMYLASE, LIPASE  No results found for: BNP  No results found for: LACTA  No results found for: PHART, PH, [id number], PCO2, PO2ART, PO2, EHQ6IAH, HCO3, BEART, BE, THGBART, THB, DEV0UQN, M2AVGGHH, O2SAT  Lab Results   Component Value Date    DDIMER 0.37 01/07/2019       Radiology  Ct Abdomen Pelvis Wo Contrast Additional Contrast? None    Result Date: 3/29/2019  Examination. CT ABDOMEN PELVIS WO CONTRAST History: The patient complains of abdominal pain. History of retroperitoneal hematoma. DLP: 1742 mGycm. The CT scan of the abdomen and pelvis is performed without oral or intravenous contrast enhancement. The images are acquired in axial plane with subsequent reconstruction in coronal and sagittal planes. There is no previous study for comparison. The lung bases included in the study show scarring and areas of discoid atelectasis in the lower lungs bilaterally. A tiny, 2 mm, subpleural nodule is seen in the right lower lobe laterally, image #6 in axial plane. Severe atheromatous changes of the coronary arteries and the visualized thoracic aorta are noted. The unenhanced liver and spleen appear unremarkable. A markedly distended gallbladder seen no gallstones. Common bile duct is normal. The pancreas is normal. The adrenal glands bilaterally are normal. There is marked lobulation of renal contour bilaterally suggesting chronic renal cortical scarring. There are bilateral peripelvic renal cysts, more pronounced on the right side. No hydronephrosis. The ureters bilaterally are normal. The urinary bladder is moderately well distended. No intrinsic abnormality. There is a previous prostatectomy surgery. The small fat-containing inguinal hernias bilaterally are seen, left larger than the right. Postsurgical changes of the stomach are noted. The duodenum is normal. The small bowel is normal. A normal appendix is seen. Moderate gas and stool are seen in the colon. There is diverticulosis of the distal colon. No evidence for diverticulitis. The atheromatous changes of the abdominal aorta and iliac arteries are seen. No aneurysmal dilatation. An inferior vena cava filter seen in place with distal end opposite mid body of L2 There is no evidence of abdominal or pelvic lymphadenopathy.  Chronic degenerative changes of the lumbar spine are seen with evidence of hardware fusion of L5-S1. A dorsal column stimulator electrodes are seen in place. There is a mild dextroscoliosis. No focal bony abnormality or a bone lesion. There is evidence of upper abdominal midline fat-containing ventral hernia. There is no evidence of retroperitoneal hematoma. No acute abnormality of the abdomen or pelvis. No evidence of retroperitoneal hematoma. A very distended gallbladder without stones. This may be further evaluated with radionuclide hepatobiliary imaging. The appendix is normal. The diverticulosis of the distal colon. No evidence for diverticulitis. Postsurgical changes of the stomach. No focal complication. The above finding are recorded on a digital voice clip in PACS. Signed by Dr Erin Bolanos on 3/29/2019 8:14 AM    Xr Lumbar Spine (2-3 Views)    Result Date: 3/20/2019  History: 27-year-old with spinal cord stimulator. Reference: None. FINDINGS: 2 intraoperative fluoroscopic digital store images at the thoracolumbar junction demonstrate dorsal column stimulator leads. Total images 2. Fluoroscopic time not available. Signed by Dr Rohan Roth on 3/20/2019 10:25 PM    Ct Femur Right Wo Contrast    Addendum Date: 3/29/2019    Addendum: There is also superficial soft tissue swelling laterally and posteriorly at the level of the mid and distal femoral shaft, correlate for cellulitis. There is no superficial abscess identified. Signed by Dr Rohan Roth on 3/29/2019 8:48 AM    Result Date: 3/29/2019   ORIGINAL REPORT  History: 27-year-old with swelling and induration. Reference: None. TECHNIQUE: Unenhanced CT imaging of the right femur. Automated exposure control was utilized to limit radiation dose. DLP 1459 mGy-cm Findings: The right femur is intact. No fracture. No bone lesion or periosteal reaction. No cortical destruction. No right hip fracture or dislocation. Very mild osteoarthritis of the right hip.  Right superior and inferior pubic with Warfarin (managed by Dr. GALICIA [facility] [place])   - INR 2.38/PT 25.2  - Warfarin 7.25 mg daily  - Harika Sharif had a chronic indwelling IVC [place] filter. - Medical records requested from [facility], Dr. Domi Tian Bi being obtained. Macrocytic anemia  - HgB 10.9  - .9    Iron substrates requested    RLE pain- IM and vascular maaging  - Venous duplex of RLE on 3/28/2019- Chronic deep vein thrombosis involving the distal femoral and chronic superficial thrombophlebitis in short saphous vein  - CT of right femur on 3/29/2019- Severe right hamstring tendinosis with ischiogluteal bursitis. Nonspecific elongated fluid collection in the central right thigh. Intact right femur without fracture or dislocation of the hip or knee joints. Thank you for the consultation and opportunity to participate in Alfred's plan of care. SANTY Jacobs  9:56 AM  3/30/2019     I personally saw and examined this patient, performing a face-to-face diagnostic evaluation with plan of care reviewed and developed with Helen Villatoro and nursing staff. I have addended and/or modified the above history of present illness, physical examination, and assessment and plan to reflect my findings and impressions. Essential elements of the care plan were discussed with Blanca BURT . Agree with findings and assessment/plan as documented above. Questions were encouraged, asked and answered to their understanding and satisfaction.     Electronically signed by Kenny Lira MD on 3/30/19 at 10:48 AM

## 2019-03-30 NOTE — PROGRESS NOTES
Per radiology new CTA prep orders were placed for patient following their protocol. Patient is to have CTA at [address] on 3/31/19.  Electronically signed by Christina Leavitt RN on 3/30/2019 at 10:13 AM

## 2019-03-30 NOTE — PROGRESS NOTES
HOSPITAL MEDICINE  - PROGRESS NOTE    Admit Date: 3/28/2019         CC: pain in right thigh    Subjective: pain in right thigh     Objective: moderate distress    Diet: DIET CARDIAC;  Pain is: Moderate  Nausea:None  Bowel Movement/Flatus yes    Data:   Scheduled Meds: Reviewed  Current Facility-Administered Medications   Medication Dose Route Frequency Provider Last Rate Last Dose    predniSONE (DELTASONE) tablet 50 mg  50 mg Oral Once Tamanna Friend MD        [START ON 3/31/2019] predniSONE (DELTASONE) tablet 50 mg  50 mg Oral Once Tamanna Friend MD        [START ON 3/31/2019] predniSONE (DELTASONE) tablet 50 mg  50 mg Oral Once MD Jennyfer Zamora [START ON 3/31/2019] diphenhydrAMINE (BENADRYL) tablet 50 mg  50 mg Oral Once Tamanna Friend MD        diltiazem 125 mg in dextrose 5 % 125 mL infusion  5 mg/hr Intravenous Continuous Ricco Garland MD   Stopped at 03/29/19 1149    sodium chloride flush 0.9 % injection 10 mL  10 mL Intravenous 2 times per day Gal Benitez MD   10 mL at 03/30/19 0842    sodium chloride flush 0.9 % injection 10 mL  10 mL Intravenous PRN Gal Benitez MD        ondansetron (ZOFRAN) injection 4 mg  4 mg Intravenous Q6H PRN Gal Benitez MD        acetaminophen (TYLENOL) tablet 650 mg  650 mg Oral Q8H PRN Carter Schwartz MD        FLUoxetine (PROZAC) capsule 60 mg  60 mg Oral Daily Carter Schwartz MD   60 mg at 03/30/19 0840    metoprolol succinate (TOPROL XL) extended release tablet 25 mg  25 mg Oral Daily Carter Schwartz MD   25 mg at 03/30/19 0840    oxyCODONE-acetaminophen (PERCOCET) 7.5-325 MG per tablet 1 tablet  1 tablet Oral Q6H PRN Gal Benitez MD   1 tablet at 03/29/19 2004    rosuvastatin (CRESTOR) tablet 10 mg  10 mg Oral Nightly Carter Schwartz MD   10 mg at 03/29/19 2003    warfarin (COUMADIN) daily dosing (placeholder)   Other RX Placeholder Carter WADDELL MD Lana        HYDROmorphone (DILAUDID) injection 0.5 mg  0.5 mg Intravenous Q3H PRN Thang Downey MD   0.5 mg at 03/30/19 0725    pregabalin (LYRICA) capsule 100 mg  100 mg Oral BID Krissy Petersen PA-C   100 mg at 03/30/19 0840    warfarin (COUMADIN) tablet 7.5 mg  7.5 mg Oral Once Li Marquis MD   Stopped at 03/30/19 1800    famotidine (PEPCID) tablet 20 mg  20 mg Oral BID Li Marquis MD   20 mg at 03/30/19 0840     DVT Prophylaxis: none    Continuous Infusions:   diltiazem (CARDIZEM) 125 mg in dextrose 5% 125 mL infusion Stopped (03/29/19 1149)       Intake/Output Summary (Last 24 hours) at 3/30/2019 1628  Last data filed at 3/30/2019 1252  Gross per 24 hour   Intake 1040 ml   Output 800 ml   Net 240 ml     CBC:   Recent Labs     03/28/19 2113 03/29/19  0848 03/30/19  1030   WBC 9.7 10.7 11.5*   HGB 11.9* 10.9* 8.5*    162 154     BMP:  Recent Labs     03/28/19 2113 03/29/19  0848 03/30/19  1030    136 136   K 4.5 4.4 4.1    102 100   CO2 23 19* 23   BUN 18 19 24*   CREATININE 0.9 0.9 0.8   GLUCOSE 128* 178* 141*     ABGs: No results found for: PHART, PO2ART, PIA2EBQ  INR:   Recent Labs     03/28/19 2113 03/29/19  0848 03/30/19  0151   INR 2.76* 2.49* 2.38*         Objective:   Vitals: /64   Pulse 84   Temp 96.1 °F (35.6 °C) (Temporal)   Resp 18   Ht 5' 8\" (1.727 m)   Wt 261 lb 6 oz (118.6 kg)   SpO2 94%   BMI 39.74 kg/m²   General appearance: alert, appears stated age and cooperative  Skin: Skin color, texture, turgor normal.   HEENT: Head: Normocephalic, no lesions, without obvious abnormality.   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

## 2019-03-31 ENCOUNTER — APPOINTMENT (OUTPATIENT)
Dept: CT IMAGING | Age: 74
DRG: 537 | End: 2019-03-31
Payer: MEDICARE

## 2019-03-31 LAB
ALBUMIN SERPL-MCNC: 3.3 G/DL (ref 3.5–5.2)
ALP BLD-CCNC: 66 U/L (ref 40–130)
ALT SERPL-CCNC: 49 U/L (ref 5–41)
ANION GAP SERPL CALCULATED.3IONS-SCNC: 11 MMOL/L (ref 7–19)
ANION GAP SERPL CALCULATED.3IONS-SCNC: 14 MMOL/L (ref 7–19)
ANION GAP SERPL CALCULATED.3IONS-SCNC: 9 MMOL/L (ref 7–19)
APTT: 24.5 SEC (ref 26–36.2)
AST SERPL-CCNC: 31 U/L (ref 5–40)
BILIRUB SERPL-MCNC: 0.6 MG/DL (ref 0.2–1.2)
BUN BLDV-MCNC: 23 MG/DL (ref 8–23)
BUN BLDV-MCNC: 23 MG/DL (ref 8–23)
BUN BLDV-MCNC: 27 MG/DL (ref 8–23)
CALCIUM SERPL-MCNC: 8.5 MG/DL (ref 8.8–10.2)
CALCIUM SERPL-MCNC: 8.7 MG/DL (ref 8.8–10.2)
CALCIUM SERPL-MCNC: 8.9 MG/DL (ref 8.8–10.2)
CHLORIDE BLD-SCNC: 100 MMOL/L (ref 98–111)
CHLORIDE BLD-SCNC: 101 MMOL/L (ref 98–111)
CHLORIDE BLD-SCNC: 103 MMOL/L (ref 98–111)
CO2: 21 MMOL/L (ref 22–29)
CO2: 23 MMOL/L (ref 22–29)
CO2: 25 MMOL/L (ref 22–29)
CREAT SERPL-MCNC: 0.8 MG/DL (ref 0.5–1.2)
CREAT SERPL-MCNC: 0.9 MG/DL (ref 0.5–1.2)
CREAT SERPL-MCNC: 0.9 MG/DL (ref 0.5–1.2)
GFR NON-AFRICAN AMERICAN: >60
GLUCOSE BLD-MCNC: 135 MG/DL (ref 74–109)
GLUCOSE BLD-MCNC: 165 MG/DL (ref 74–109)
GLUCOSE BLD-MCNC: 270 MG/DL (ref 74–109)
HAPTOGLOBIN: 136 MG/DL (ref 30–200)
HCT VFR BLD CALC: 24 % (ref 42–52)
HCT VFR BLD CALC: 24.3 % (ref 42–52)
HCT VFR BLD CALC: 25.4 % (ref 42–52)
HCT VFR BLD CALC: 25.6 % (ref 42–52)
HEMOGLOBIN: 8 G/DL (ref 14–18)
HEMOGLOBIN: 8.1 G/DL (ref 14–18)
HEMOGLOBIN: 8.2 G/DL (ref 14–18)
INR BLD: 1.72 (ref 0.88–1.18)
LACTATE DEHYDROGENASE: 237 U/L (ref 91–215)
MCH RBC QN AUTO: 34 PG (ref 27–31)
MCH RBC QN AUTO: 34.3 PG (ref 27–31)
MCH RBC QN AUTO: 34.3 PG (ref 27–31)
MCHC RBC AUTO-ENTMCNC: 31.6 G/DL (ref 33–37)
MCHC RBC AUTO-ENTMCNC: 32.3 G/DL (ref 33–37)
MCHC RBC AUTO-ENTMCNC: 32.9 G/DL (ref 33–37)
MCV RBC AUTO: 104.3 FL (ref 80–94)
MCV RBC AUTO: 106.3 FL (ref 80–94)
MCV RBC AUTO: 107.6 FL (ref 80–94)
PDW BLD-RTO: 12.9 % (ref 11.5–14.5)
PDW BLD-RTO: 13.1 % (ref 11.5–14.5)
PDW BLD-RTO: 13.2 % (ref 11.5–14.5)
PLATELET # BLD: 154 K/UL (ref 130–400)
PLATELET # BLD: 170 K/UL (ref 130–400)
PLATELET # BLD: 236 K/UL (ref 130–400)
PMV BLD AUTO: 10.1 FL (ref 9.4–12.4)
PMV BLD AUTO: 9.4 FL (ref 9.4–12.4)
PMV BLD AUTO: 9.5 FL (ref 9.4–12.4)
POTASSIUM SERPL-SCNC: 4.6 MMOL/L (ref 3.5–5)
POTASSIUM SERPL-SCNC: 4.8 MMOL/L (ref 3.5–5)
POTASSIUM SERPL-SCNC: 5.3 MMOL/L (ref 3.5–5)
POTASSIUM SERPL-SCNC: 5.6 MMOL/L (ref 3.5–5)
PROTHROMBIN TIME: 19.4 SEC (ref 12–14.6)
RBC # BLD: 2.33 M/UL (ref 4.7–6.1)
RBC # BLD: 2.38 M/UL (ref 4.7–6.1)
RBC # BLD: 2.39 M/UL (ref 4.7–6.1)
RETICULOCYTE ABSOLUTE COUNT: 0.1 M/UL (ref 0.03–0.12)
RETICULOCYTE COUNT PCT: 4.57 % (ref 0.5–1.5)
SODIUM BLD-SCNC: 134 MMOL/L (ref 136–145)
SODIUM BLD-SCNC: 136 MMOL/L (ref 136–145)
SODIUM BLD-SCNC: 137 MMOL/L (ref 136–145)
TOTAL PROTEIN: 5.8 G/DL (ref 6.6–8.7)
TSH REFLEX FT4: 0.54 UIU/ML (ref 0.35–5.5)
WBC # BLD: 10.2 K/UL (ref 4.8–10.8)
WBC # BLD: 10.3 K/UL (ref 4.8–10.8)
WBC # BLD: 13.8 K/UL (ref 4.8–10.8)

## 2019-03-31 PROCEDURE — 6370000000 HC RX 637 (ALT 250 FOR IP): Performed by: SURGERY

## 2019-03-31 PROCEDURE — 85610 PROTHROMBIN TIME: CPT

## 2019-03-31 PROCEDURE — 85730 THROMBOPLASTIN TIME PARTIAL: CPT

## 2019-03-31 PROCEDURE — 75635 CT ANGIO ABDOMINAL ARTERIES: CPT

## 2019-03-31 PROCEDURE — 85027 COMPLETE CBC AUTOMATED: CPT

## 2019-03-31 PROCEDURE — 99232 SBSQ HOSP IP/OBS MODERATE 35: CPT | Performed by: SURGERY

## 2019-03-31 PROCEDURE — 36415 COLL VENOUS BLD VENIPUNCTURE: CPT

## 2019-03-31 PROCEDURE — 85045 AUTOMATED RETICULOCYTE COUNT: CPT

## 2019-03-31 PROCEDURE — 6370000000 HC RX 637 (ALT 250 FOR IP): Performed by: INTERNAL MEDICINE

## 2019-03-31 PROCEDURE — 83010 ASSAY OF HAPTOGLOBIN QUANT: CPT

## 2019-03-31 PROCEDURE — 2140000000 HC CCU INTERMEDIATE R&B

## 2019-03-31 PROCEDURE — 6360000002 HC RX W HCPCS: Performed by: HOSPITALIST

## 2019-03-31 PROCEDURE — 6370000000 HC RX 637 (ALT 250 FOR IP): Performed by: HOSPITALIST

## 2019-03-31 PROCEDURE — 6360000004 HC RX CONTRAST MEDICATION: Performed by: HOSPITALIST

## 2019-03-31 PROCEDURE — 6370000000 HC RX 637 (ALT 250 FOR IP): Performed by: PHYSICIAN ASSISTANT

## 2019-03-31 PROCEDURE — 99232 SBSQ HOSP IP/OBS MODERATE 35: CPT | Performed by: HOSPITALIST

## 2019-03-31 PROCEDURE — 2580000003 HC RX 258: Performed by: INTERNAL MEDICINE

## 2019-03-31 PROCEDURE — 84443 ASSAY THYROID STIM HORMONE: CPT

## 2019-03-31 PROCEDURE — 80053 COMPREHEN METABOLIC PANEL: CPT

## 2019-03-31 PROCEDURE — 84132 ASSAY OF SERUM POTASSIUM: CPT

## 2019-03-31 PROCEDURE — 83615 LACTATE (LD) (LDH) ENZYME: CPT

## 2019-03-31 RX ORDER — WARFARIN SODIUM 2.5 MG/1
2.5 TABLET ORAL DAILY
Status: DISCONTINUED | OUTPATIENT
Start: 2019-04-01 | End: 2019-04-01

## 2019-03-31 RX ORDER — SODIUM POLYSTYRENE SULFONATE 15 G/60ML
15 SUSPENSION ORAL; RECTAL ONCE
Status: COMPLETED | OUTPATIENT
Start: 2019-03-31 | End: 2019-03-31

## 2019-03-31 RX ADMIN — ROSUVASTATIN CALCIUM 10 MG: 10 TABLET, FILM COATED ORAL at 21:02

## 2019-03-31 RX ADMIN — METOPROLOL SUCCINATE 25 MG: 25 TABLET, EXTENDED RELEASE ORAL at 08:51

## 2019-03-31 RX ADMIN — Medication 10 ML: at 08:50

## 2019-03-31 RX ADMIN — FAMOTIDINE 20 MG: 20 TABLET ORAL at 08:50

## 2019-03-31 RX ADMIN — OXYCODONE HYDROCHLORIDE AND ACETAMINOPHEN 1 TABLET: 7.5; 325 TABLET ORAL at 08:55

## 2019-03-31 RX ADMIN — PREDNISONE 50 MG: 20 TABLET ORAL at 01:00

## 2019-03-31 RX ADMIN — PREDNISONE 50 MG: 10 TABLET ORAL at 07:11

## 2019-03-31 RX ADMIN — PREGABALIN 100 MG: 50 CAPSULE ORAL at 08:50

## 2019-03-31 RX ADMIN — IOPAMIDOL 90 ML: 755 INJECTION, SOLUTION INTRAVENOUS at 09:29

## 2019-03-31 RX ADMIN — FLUOXETINE 60 MG: 20 CAPSULE ORAL at 08:51

## 2019-03-31 RX ADMIN — PREGABALIN 100 MG: 50 CAPSULE ORAL at 21:02

## 2019-03-31 RX ADMIN — Medication 10 ML: at 21:02

## 2019-03-31 RX ADMIN — SODIUM POLYSTYRENE SULFONATE 15 G: 15 SUSPENSION ORAL; RECTAL at 04:23

## 2019-03-31 RX ADMIN — HYDROMORPHONE HYDROCHLORIDE 0.5 MG: 1 INJECTION, SOLUTION INTRAMUSCULAR; INTRAVENOUS; SUBCUTANEOUS at 04:23

## 2019-03-31 RX ADMIN — OXYCODONE HYDROCHLORIDE AND ACETAMINOPHEN 1 TABLET: 7.5; 325 TABLET ORAL at 21:02

## 2019-03-31 RX ADMIN — DIPHENHYDRAMINE HCL 50 MG: 25 TABLET ORAL at 06:53

## 2019-03-31 RX ADMIN — OXYCODONE HYDROCHLORIDE AND ACETAMINOPHEN 1 TABLET: 7.5; 325 TABLET ORAL at 15:39

## 2019-03-31 ASSESSMENT — ENCOUNTER SYMPTOMS
BACK PAIN: 0
BLOOD IN STOOL: 0
EYE PAIN: 0
ABDOMINAL PAIN: 0
WHEEZING: 0
GASTROINTESTINAL NEGATIVE: 1
COUGH: 0
SORE THROAT: 0
DIARRHEA: 0
EYE DISCHARGE: 0
VOMITING: 0
EYES NEGATIVE: 1
SHORTNESS OF BREATH: 1
CONSTIPATION: 0
NAUSEA: 0
EYE REDNESS: 0

## 2019-03-31 ASSESSMENT — PAIN SCALES - GENERAL
PAINLEVEL_OUTOF10: 4
PAINLEVEL_OUTOF10: 5
PAINLEVEL_OUTOF10: 5
PAINLEVEL_OUTOF10: 6
PAINLEVEL_OUTOF10: 5
PAINLEVEL_OUTOF10: 6
PAINLEVEL_OUTOF10: 6
PAINLEVEL_OUTOF10: 5
PAINLEVEL_OUTOF10: 5

## 2019-03-31 NOTE — PROGRESS NOTES
HOSPITAL MEDICINE  - PROGRESS NOTE    Admit Date: 3/28/2019         CC: pain in right thigh    Subjective: pain in right thigh better    Objective: mild distress    Diet: DIET CARDIAC;  Pain is: Moderate  Nausea:None  Bowel Movement/Flatus yes    Data:   Scheduled Meds: Reviewed  Current Facility-Administered Medications   Medication Dose Route Frequency Provider Last Rate Last Dose    [START ON 4/1/2019] warfarin (COUMADIN) tablet 2.5 mg  2.5 mg Oral Daily Iram Davis MD        diltiazem 125 mg in dextrose 5 % 125 mL infusion  5 mg/hr Intravenous Continuous Navarro Sheffield MD   Stopped at 03/29/19 1149    sodium chloride flush 0.9 % injection 10 mL  10 mL Intravenous 2 times per day Rishi Cisneros MD   10 mL at 03/31/19 0850    sodium chloride flush 0.9 % injection 10 mL  10 mL Intravenous PRN Rishi Cisneros MD        ondansetron (ZOFRAN) injection 4 mg  4 mg Intravenous Q6H PRN Rishi Cisneros MD        acetaminophen (TYLENOL) tablet 650 mg  650 mg Oral Q8H PRN Carter Schwartz MD        FLUoxetine (PROZAC) capsule 60 mg  60 mg Oral Daily Carter Schwartz MD   60 mg at 03/31/19 0851    metoprolol succinate (TOPROL XL) extended release tablet 25 mg  25 mg Oral Daily Carter Schwartz MD   25 mg at 03/31/19 0851    oxyCODONE-acetaminophen (PERCOCET) 7.5-325 MG per tablet 1 tablet  1 tablet Oral Q6H PRN Rishi Cisneros MD   1 tablet at 03/31/19 1539    rosuvastatin (CRESTOR) tablet 10 mg  10 mg Oral Nightly Carter Schwartz MD   10 mg at 03/30/19 2123    warfarin (COUMADIN) daily dosing (placeholder)   Other RX Placeholder Rishi Cisneros MD        HYDROmorphone (DILAUDID) injection 0.5 mg  0.5 mg Intravenous Q3H PRN Iram Davis MD   0.5 mg at 03/31/19 2073    pregabalin (LYRICA) capsule 100 mg  100 mg Oral BID Jackelyn Ortiz PA-C   100 mg at 03/31/19 1558     DVT Prophylaxis' on warfarin    Continuous Infusions:   diltiazem (CARDIZEM) 125 mg in dextrose 5% 125 mL infusion Stopped (03/29/19 1149)       Intake/Output Summary (Last 24 hours) at 3/31/2019 1730  Last data filed at 3/31/2019 1440  Gross per 24 hour   Intake 2080 ml   Output 875 ml   Net 1205 ml     CBC:   Recent Labs     03/30/19  1908 03/31/19  0014 03/31/19  0144   WBC 10.4 10.2 10.3   HGB 8.3* 8.0* 8.1*    170 154     BMP:  Recent Labs     03/30/19  1030 03/31/19  0144 03/31/19  0659 03/31/19  1325    137  --  134*   K 4.1 5.6* 4.8 5.3*    103  --  100   CO2 23 23  --  25   BUN 24* 23  --  23   CREATININE 0.8 0.8  --  0.9   GLUCOSE 141* 165*  --  270*     ABGs: No results found for: PHART, PO2ART, FOC9MKI  INR:   Recent Labs     03/29/19  0848 03/30/19  0151 03/31/19  0144   INR 2.49* 2.38* 1.72*         Objective:   Vitals: /62   Pulse 84   Temp 98.2 °F (36.8 °C) (Temporal)   Resp 20   Ht 5' 8\" (1.727 m)   Wt 263 lb 12.8 oz (119.7 kg)   SpO2 96%   BMI 40.11 kg/m²   General appearance: alert, appears stated age and cooperative  Skin: Skin color, texture, turgor normal.   HEENT: Head: Normocephalic, no lesions, without obvious abnormality.   Neck: no adenopathy, no carotid bruit, no JVD and supple, symmetrical, trachea midline  Lungs: clear to auscultation bilaterally  Heart: irregularly irregular rhythm  Abdomen: soft, non-tender; bowel sounds normal; no masses,  no organomegaly  Extremities: swollen tender right upper thigh with hematoma   Lymphatic: No significant lymph node enlargement papable  Neurologic: Mental status: Alert, oriented, thought content appropriate        Assessment & Plan:    · Pain right thigh- with chronic DVT- most likely 2/2 hematoma- monitor for compartment - VS on board- hold warfarin per VS   · Acute hamstrings avulsion injury per cta RLE- per Dr Elly Perez no emergent intervention needed- will be seen by Dr Ye Barraza tomorrow   · 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

## 2019-03-31 NOTE — PROGRESS NOTES
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 Continues to have discomfort to right thigh    HISTORY OF PRESENT ILLNESS:   Mr. Linwood Tristan is a pleasant 19-year-old  gentleman who is presently admitted right lower extremity thigh pain with decreased mobility. Harini Vences had pain/stimulator by Dr. Lipscomb Dears placed on 3/20/2019 for his chronic back and bilateral leg pain. He has a history of atrial fibrillation and GEE deficiency requiring chronic anticoagulation therapy with warfarin and has a [place] filter in place. For the procedure he was placed on Lovenox and resumed warfarin with an INR of 2.6 on 3/28/2019. Harini Vences is presently admitted for pain control of his right lower extremity.     Venous duplex of RLE on 3/28/2019- Chronic deep vein thrombosis involving the distal femoral and chronic superficial thrombophlebitis in short saphous vein     CT of right femur on 3/29/2019- Severe right hamstring tendinosis with ischiogluteal bursitis. Nonspecific elongated fluid collection in the central right thigh. Intact right femur without fracture or dislocation of the hip or knee joints      Review of Systems   Constitutional: Positive for fatigue. Negative for chills, diaphoresis and fever. HENT: Negative.   Negative for congestion, ear pain, hearing loss, nosebleeds, sore throat and tinnitus. Eyes: Negative. Negative for pain, discharge and redness. Respiratory: Positive for shortness of breath. Negative for cough and wheezing. Cardiovascular: Negative. Negative for chest pain, palpitations and leg swelling. Gastrointestinal: Negative. Negative for abdominal pain, blood in stool, constipation, diarrhea, nausea and vomiting. Endocrine: Negative for polydipsia. Genitourinary: Negative for dysuria, flank pain, frequency, hematuria and urgency. Musculoskeletal: Negative. Negative for back pain, myalgias and neck pain. Skin: Negative. Negative for rash. Neurological: Positive for weakness. Negative for dizziness, tremors, seizures and headaches. Hematological: Does not bruise/bleed easily. Psychiatric/Behavioral: Negative. The patient is not nervous/anxious. Current Pain Assessment  Pain Assessment  Pain Assessment: 0-10  Pain Level: 5  Response to Pain Intervention: Patient Satisfied    OBJECTIVE:  VITALS: BP (!) 103/52   Pulse 77   Temp 97.5 °F (36.4 °C) (Temporal)   Resp 20   Ht 5' 8\" (1.727 m)   Wt 263 lb 12.8 oz (119.7 kg)   SpO2 97%   BMI 40.11 kg/m²   I&O:     Intake/Output Summary (Last 24 hours) at 3/31/2019 1206  Last data filed at 3/31/2019 1021  Gross per 24 hour   Intake 1880 ml   Output 875 ml   Net 1005 ml         Physical Exam   Constitutional: He is oriented to person, place, and time. He appears well-developed and well-nourished. No distress. HENT:   Head: Normocephalic and atraumatic. Mouth/Throat: Oropharynx is clear and moist.   Eyes: Conjunctivae are normal. Right eye exhibits no discharge. Left eye exhibits no discharge. No scleral icterus. Neck: Normal range of motion. Neck supple. No JVD present. No tracheal deviation present. Cardiovascular: Normal rate, regular rhythm, normal heart sounds and intact distal pulses. Exam reveals no gallop and no friction rub. No murmur heard.   Pulmonary/Chest: Effort normal and breath sounds normal. No respiratory distress. He has no wheezes. He has no rales. He exhibits no tenderness. Abdominal: Soft. Bowel sounds are normal. He exhibits no distension and no mass. There is no tenderness. There is no rebound and no guarding. No hernia. Musculoskeletal: Normal range of motion. He exhibits edema and tenderness. He exhibits no deformity. RLE   Neurological: He is alert and oriented to person, place, and time. No cranial nerve deficit. Coordination normal.   Skin: Skin is warm. No rash noted. He is not diaphoretic. No erythema. No pallor. Ecchymosis to extremities and abdomen    Psychiatric: He has a normal mood and affect. His behavior is normal. Thought content normal.   Nursing note and vitals reviewed. BMP:   Recent Labs     03/30/19  1030 03/31/19  0144 03/31/19  0659    137  --    K 4.1 5.6* 4.8    103  --    CO2 23 23  --    BUN 24* 23  --    CREATININE 0.8 0.8  --    GLUCOSE 141* 165*  --    CALCIUM 8.4* 8.5*  --      CBC:   Recent Labs     03/31/19  0014 03/31/19  0144   WBC 10.2 10.3   HGB 8.0* 8.1*   HCT 24.3* 25.6*   .3* 107.6*    154     CMP:    Recent Labs     03/28/19  2113  03/30/19  1030 03/31/19  0144 03/31/19  0659      < > 136 137  --    K 4.5   < > 4.1 5.6* 4.8      < > 100 103  --    CO2 23   < > 23 23  --    BUN 18   < > 24* 23  --    CREATININE 0.9   < > 0.8 0.8  --    LABGLOM >60   < > >60 >60  --    GLUCOSE 128*   < > 141* 165*  --    PROT 6.7  --   --   --   --    LABALBU 3.8  --   --   --   --    CALCIUM 8.7*   < > 8.4* 8.5*  --    BILITOT 0.5  --   --   --   --    ALKPHOS 82  --   --   --   --    AST 42*  --   --   --   --    ALT 74*  --   --   --   --     < > = values in this interval not displayed. 30Day lookback of cultures:    Blood Culture Recent: No results for input(s): BC in the last 720 hours. Gram Stain Recent: No results for input(s): LABGRAM in the last 720 hours.   Resp Culture Recent: No results for input(s): CULTRESP in the last 720 hours. Body Fluid Recent : No results for input(s): BFCX in the last 720 hours. MRSA Recent : No results for input(s): [address] in the last 720 hours. Urine Culture Recent : No results for input(s): LABURIN in the last 720 hours. Organism Recent : No results for input(s): ORG in the last 720 hours. Radiology:   Ct Abdomen Pelvis Wo Contrast Additional Contrast? None    Result Date: 3/29/2019  Examination. CT ABDOMEN PELVIS WO CONTRAST History: The patient complains of abdominal pain. History of retroperitoneal hematoma. DLP: 1742 mGycm. The CT scan of the abdomen and pelvis is performed without oral or intravenous contrast enhancement. The images are acquired in axial plane with subsequent reconstruction in coronal and sagittal planes. There is no previous study for comparison. The lung bases included in the study show scarring and areas of discoid atelectasis in the lower lungs bilaterally. A tiny, 2 mm, subpleural nodule is seen in the right lower lobe laterally, image #6 in axial plane. Severe atheromatous changes of the coronary arteries and the visualized thoracic aorta are noted. The unenhanced liver and spleen appear unremarkable. A markedly distended gallbladder seen no gallstones. Common bile duct is normal. The pancreas is normal. The adrenal glands bilaterally are normal. There is marked lobulation of renal contour bilaterally suggesting chronic renal cortical scarring. There are bilateral peripelvic renal cysts, more pronounced on the right side. No hydronephrosis. The ureters bilaterally are normal. The urinary bladder is moderately well distended. No intrinsic abnormality. There is a previous prostatectomy surgery. The small fat-containing inguinal hernias bilaterally are seen, left larger than the right. Postsurgical changes of the stomach are noted. The duodenum is normal. The small bowel is normal. A normal appendix is seen.  Moderate gas and stool are seen in the colon. There is diverticulosis of the distal colon. No evidence for diverticulitis. The atheromatous changes of the abdominal aorta and iliac arteries are seen. No aneurysmal dilatation. An inferior vena cava filter seen in place with distal end opposite mid body of L2 There is no evidence of abdominal or pelvic lymphadenopathy. Chronic degenerative changes of the lumbar spine are seen with evidence of hardware fusion of L5-S1. A dorsal column stimulator electrodes are seen in place. There is a mild dextroscoliosis. No focal bony abnormality or a bone lesion. There is evidence of upper abdominal midline fat-containing ventral hernia. There is no evidence of retroperitoneal hematoma. No acute abnormality of the abdomen or pelvis. No evidence of retroperitoneal hematoma. A very distended gallbladder without stones. This may be further evaluated with radionuclide hepatobiliary imaging. The appendix is normal. The diverticulosis of the distal colon. No evidence for diverticulitis. Postsurgical changes of the stomach. No focal complication. The above finding are recorded on a digital voice clip in PACS. Signed by Dr Miri Jauregui on 3/29/2019 8:14 AM    Xr Lumbar Spine (2-3 Views)    Result Date: 3/20/2019  History: 79-year-old with spinal cord stimulator. Reference: None. FINDINGS: 2 intraoperative fluoroscopic digital store images at the thoracolumbar junction demonstrate dorsal column stimulator leads. Total images 2. Fluoroscopic time not available. Signed by Dr Estuardo Rose on 3/20/2019 10:25 PM    Cta Abdominal Aorta W Bilat Runoff W Wo Contrast    Result Date: 3/31/2019  Indication 72year-old with back pain. Technique Spiral imaging was performed through the abdomen, the pelvis, and both lower extremities during uneventful administration of intravenous contrast, with bolus optimized for arterial evaluation. 3-D MIP reformatted images were obtained.  Automated exposure control was utilized to limit radiation dose. DLP 3620 mGy-cm Findings ABDOMEN/PELVIS Mild bronchiectasis in the lung bases with minimal atelectasis. Previous median sternotomy. The heart is enlarged. No pericardial effusion. No liver or splenic masses. Gallbladder is distended. Postoperative changes of the stomach. No adrenal mass. No obstructive uropathy. No acute abnormality of the bowel. Evidence of previous small bowel surgery. Colonic diverticulosis. Dorsal column stimulator lead. ABDOMINAL AORTA There is mild atherosclerosis of the abdominal aorta without aneurysm or dissection. Celiac axis and SMA are widely patent. No renal artery stenosis is seen. The inferior mesenteric artery is patent. There is diffuse asymmetric swelling of the entire right leg. I suspect a hematoma in the posterior compartment right side. I cannot determine if this is an INTRAmuscular hematoma or an INTERmuscular hematoma. RIGHT LOWER EXTREMITY Mild atherosclerosis of the right common iliac artery which is tortuous proximally. No focal stenosis however. Right external iliac artery is widely patent with minimal plaque. Nonstenotic CFA. Mild diffuse calcific plaque of the SFA without stenosis. Popliteal artery is normal. There is trifurcation disease. Mild/moderate stenosis of the tibioperoneal trunk. Severe multifocal stenoses of the anterior tibial artery. Multifocal severe stenoses of the posterior tibial artery. There is difficulty evaluating the distal tibial vessels due to the calcific plaque. I do believe there is three-vessel runoff to the right foot however. LEFT LOWER EXTREMITY Mild atherosclerosis of the common iliac artery which is nonstenotic. Nonstenotic external iliac artery and CFA with mild atherosclerosis. Mild to moderate atherosclerosis of the distal SFA without significant luminal narrowing. Portions of the left popliteal artery is obscured by streak artifact from the left knee arthroplasty.  Otherwise no popliteal artery Mansoor Herman MD        FLUoxetine (PROZAC) capsule 60 mg  60 mg Oral Daily Carter Schwartz MD   60 mg at 03/31/19 4813    metoprolol succinate (TOPROL XL) extended release tablet 25 mg  25 mg Oral Daily Carter Schwartz MD   25 mg at 03/31/19 0851    oxyCODONE-acetaminophen (PERCOCET) 7.5-325 MG per tablet 1 tablet  1 tablet Oral Q6H PRN Mansoor Herman MD   1 tablet at 03/31/19 0855    rosuvastatin (CRESTOR) tablet 10 mg  10 mg Oral Nightly Carter Schwartz MD   10 mg at 03/30/19 2123    warfarin (COUMADIN) daily dosing (placeholder)   Other RX Placeholder Mansoor Herman MD        HYDROmorphone (DILAUDID) injection 0.5 mg  0.5 mg Intravenous Q3H PRN Alix Malone MD   0.5 mg at 03/31/19 0423    pregabalin (LYRICA) capsule 100 mg  100 mg Oral BID Antonio Vargas PA-C   100 mg at 03/31/19 1259       Allergies  Iodine; Morphine; and Shellfish-derived products      ASSESSMENT/PLAN:  GEE Gene Homozygous Mutation, chronic anticoagulation with Warfarin (managed by Dr. Kiran Aldana)   - INR 1.72/PT 19.4  - Warfarin 7.25 mg daily- on HOLD per IM    - Chronic indwelling IVC [place] filter in place.  - Medical records requested from Dr. Ruby Siddiqi Bi being obtained.   This may have been [facility] [place].     Macrocytic anemia - acute blood loss, presently unknown source  - HgB 8.1  - .6     Serology studies on 3/29/2019:  Ferritin 108.5  Iron 87  Iron saturation 29%  TIBC 296  Folate 14.7  Vitamin B12 795  Reticulocyte count 0.1022    Serology studies requested on 3/31/2019: Done due to drop in Hgb with increase in MCV  Repeat reticulocyte count   LDH  Haptoglobin  CMP  TSH with reflex to T3/T4  Occult stool    May need to consider bone marrow aspiration and biopsy due to elevated MCV without etiology to R/O MDS.     RLE pain- IM and vascular managing  - Venous duplex of RLE on 3/28/2019- Chronic deep vein thrombosis involving the distal femoral and chronic superficial thrombophlebitis in short saphous vein  - CT of right femur on 3/29/2019- Severe right hamstring tendinosis with ischiogluteal bursitis. Nonspecific elongated fluid collection in the central right thigh. Intact right femur without fracture or dislocation of the hip or knee joints.          Blanca Robertson, SANTY    03/31/19  12:06 PM      I personally saw and examined this patient, performing a face-to-face diagnostic evaluation with plan of care reviewed and developed with [facility] and nursing staff. I have addended and/or modified the above history of present illness, physical examination, and assessment and plan to reflect my findings and impressions. Essential elements of the care plan were discussed with Blanca BURT . Agree with findings and assessment/plan as documented above. Questions were encouraged, asked and answered to their understanding and satisfaction.     Electronically signed by Iram Kyle MD on 3/31/19 at 1:00 PM

## 2019-03-31 NOTE — PLAN OF CARE
Problem: Falls - Risk of:  Goal: Will remain free from falls  Description  Will remain free from falls  Outcome: Ongoing  Goal: Absence of physical injury  Description  Absence of physical injury  Outcome: Ongoing     Problem: Pain:  Goal: Pain level will decrease  Description  Pain level will decrease  Outcome: Ongoing  Goal: Control of acute pain  Description  Control of acute pain  Outcome: Ongoing  Goal: Control of chronic pain  Description  Control of chronic pain  Outcome: Ongoing

## 2019-04-01 LAB
ABO/RH: NORMAL
ANION GAP SERPL CALCULATED.3IONS-SCNC: 9 MMOL/L (ref 7–19)
ANTIBODY SCREEN: NORMAL
APTT: 26.6 SEC (ref 26–36.2)
BLOOD BANK DISPENSE STATUS: NORMAL
BLOOD BANK PRODUCT CODE: NORMAL
BPU ID: NORMAL
BUN BLDV-MCNC: 22 MG/DL (ref 8–23)
CALCIUM SERPL-MCNC: 8.3 MG/DL (ref 8.8–10.2)
CHLORIDE BLD-SCNC: 102 MMOL/L (ref 98–111)
CO2: 29 MMOL/L (ref 22–29)
CREAT SERPL-MCNC: 0.8 MG/DL (ref 0.5–1.2)
DESCRIPTION BLOOD BANK: NORMAL
GFR NON-AFRICAN AMERICAN: >60
GLUCOSE BLD-MCNC: 115 MG/DL (ref 74–109)
HCT VFR BLD CALC: 22.5 % (ref 42–52)
HEMOGLOBIN: 7.4 G/DL (ref 14–18)
INR BLD: 1.48 (ref 0.88–1.18)
MCH RBC QN AUTO: 35.1 PG (ref 27–31)
MCHC RBC AUTO-ENTMCNC: 32.9 G/DL (ref 33–37)
MCV RBC AUTO: 106.6 FL (ref 80–94)
PDW BLD-RTO: 13.2 % (ref 11.5–14.5)
PLATELET # BLD: 184 K/UL (ref 130–400)
PMV BLD AUTO: 9.1 FL (ref 9.4–12.4)
POTASSIUM SERPL-SCNC: 4.4 MMOL/L (ref 3.5–5)
PROTHROMBIN TIME: 17.2 SEC (ref 12–14.6)
RBC # BLD: 2.11 M/UL (ref 4.7–6.1)
SODIUM BLD-SCNC: 140 MMOL/L (ref 136–145)
WBC # BLD: 11.3 K/UL (ref 4.8–10.8)

## 2019-04-01 PROCEDURE — P9016 RBC LEUKOCYTES REDUCED: HCPCS

## 2019-04-01 PROCEDURE — 6370000000 HC RX 637 (ALT 250 FOR IP): Performed by: PHYSICIAN ASSISTANT

## 2019-04-01 PROCEDURE — 86850 RBC ANTIBODY SCREEN: CPT

## 2019-04-01 PROCEDURE — 2140000000 HC CCU INTERMEDIATE R&B

## 2019-04-01 PROCEDURE — 36430 TRANSFUSION BLD/BLD COMPNT: CPT

## 2019-04-01 PROCEDURE — 99231 SBSQ HOSP IP/OBS SF/LOW 25: CPT | Performed by: NURSE PRACTITIONER

## 2019-04-01 PROCEDURE — 2580000003 HC RX 258: Performed by: NURSE PRACTITIONER

## 2019-04-01 PROCEDURE — 86900 BLOOD TYPING SEROLOGIC ABO: CPT

## 2019-04-01 PROCEDURE — 80048 BASIC METABOLIC PNL TOTAL CA: CPT

## 2019-04-01 PROCEDURE — 85730 THROMBOPLASTIN TIME PARTIAL: CPT

## 2019-04-01 PROCEDURE — 2580000003 HC RX 258: Performed by: INTERNAL MEDICINE

## 2019-04-01 PROCEDURE — 36415 COLL VENOUS BLD VENIPUNCTURE: CPT

## 2019-04-01 PROCEDURE — 6370000000 HC RX 637 (ALT 250 FOR IP): Performed by: INTERNAL MEDICINE

## 2019-04-01 PROCEDURE — 85610 PROTHROMBIN TIME: CPT

## 2019-04-01 PROCEDURE — 99232 SBSQ HOSP IP/OBS MODERATE 35: CPT | Performed by: FAMILY MEDICINE

## 2019-04-01 PROCEDURE — 85027 COMPLETE CBC AUTOMATED: CPT

## 2019-04-01 PROCEDURE — 86923 COMPATIBILITY TEST ELECTRIC: CPT

## 2019-04-01 PROCEDURE — 86901 BLOOD TYPING SEROLOGIC RH(D): CPT

## 2019-04-01 RX ORDER — 0.9 % SODIUM CHLORIDE 0.9 %
250 INTRAVENOUS SOLUTION INTRAVENOUS ONCE
Status: COMPLETED | OUTPATIENT
Start: 2019-04-01 | End: 2019-04-02

## 2019-04-01 RX ADMIN — PREGABALIN 100 MG: 50 CAPSULE ORAL at 20:06

## 2019-04-01 RX ADMIN — Medication 10 ML: at 20:07

## 2019-04-01 RX ADMIN — PREGABALIN 100 MG: 50 CAPSULE ORAL at 09:20

## 2019-04-01 RX ADMIN — ROSUVASTATIN CALCIUM 10 MG: 10 TABLET, FILM COATED ORAL at 20:07

## 2019-04-01 RX ADMIN — FLUOXETINE 60 MG: 20 CAPSULE ORAL at 09:20

## 2019-04-01 RX ADMIN — OXYCODONE HYDROCHLORIDE AND ACETAMINOPHEN 1 TABLET: 7.5; 325 TABLET ORAL at 19:31

## 2019-04-01 RX ADMIN — METOPROLOL SUCCINATE 25 MG: 25 TABLET, EXTENDED RELEASE ORAL at 09:20

## 2019-04-01 RX ADMIN — Medication 10 ML: at 09:20

## 2019-04-01 RX ADMIN — SODIUM CHLORIDE 250 ML: 9 INJECTION, SOLUTION INTRAVENOUS at 16:53

## 2019-04-01 ASSESSMENT — ENCOUNTER SYMPTOMS
VOMITING: 0
GASTROINTESTINAL NEGATIVE: 1
SHORTNESS OF BREATH: 1
CONSTIPATION: 0
COUGH: 0
EYE REDNESS: 0
EYES NEGATIVE: 1
NAUSEA: 0
ABDOMINAL PAIN: 0
DIARRHEA: 0
WHEEZING: 0
EYE PAIN: 0
EYE DISCHARGE: 0
BLOOD IN STOOL: 0
BACK PAIN: 1
SORE THROAT: 0

## 2019-04-01 ASSESSMENT — PAIN SCALES - GENERAL: PAINLEVEL_OUTOF10: 6

## 2019-04-01 NOTE — PROGRESS NOTES
[facility]      Patient:  Negrita Cruz  YOB: 1945  Date of Service: 4/1/2019  MRN: [id number]   Acct: [de-identified]   Primary Care Physician: Yamilet Marcial MD  Advance Directive: Full Code  Admit Date: 3/28/2019       Hospital Day: 3    CHIEF COMPLAINT Right thigh pain    Subjective:  Resting comfortably, tolerating pain    Objective:   VITALS:  /64   Pulse 63   Temp 98 °F (36.7 °C) (Temporal)   Resp 16   Ht 5' 8\" (1.727 m)   Wt 266 lb 3.2 oz (120.7 kg)   SpO2 95%   BMI 40.48 kg/m²   24HR INTAKE/OUTPUT:      Intake/Output Summary (Last 24 hours) at 4/1/2019 1424  Last data filed at 4/1/2019 1304  Gross per 24 hour   Intake 1820 ml   Output 1400 ml   Net 420 ml     Constitutional: Oriented to person, place, and time. Well-developed and well-nourished. No distress. HENT:  Head: Normocephalic and atraumatic.    Mouth/Throat: Oropharynx is clear and moist. No oropharyngeal exudate. Eyes: Conjunctivae and EOM are normal. Pupils are equal, round, and reactive to light. No scleral icterus. Neck: Normal range of motion. Neck supple. No JVD present. No tracheal deviation present. Cardiovascular: Normal rate, regular rhythm and normal heart sounds.  Exam reveals no gallop and no friction rub.     Pulmonary/Chest: Effort normal and breath sounds normal. No stridor. No respiratory distress. No wheezes. No rales. Abdominal: Soft. Bowel sounds are normal. No distension and no mass. There is no tenderness. There is no rebound and no guarding. Musculoskeletal: Normal range of motion. Right upper thigh with hematoma  Neurological: Alert and oriented to person, place, and time. No cranial nerve deficit. Skin: Skin is warm and dry. Psychiatric: Normal mood and affect.     Medications:      diltiazem (CARDIZEM) 125 mg in dextrose 5% 125 mL infusion Stopped (03/29/19 1149)      sodium chloride flush  10 mL Intravenous 2 times per day    FLUoxetine  60 mg Oral Daily    metoprolol succinate  25 mg Oral Daily    rosuvastatin  10 mg Oral Nightly    warfarin (COUMADIN) daily dosing (placeholder)   Other RX Placeholder    pregabalin  100 mg Oral BID     sodium chloride flush, ondansetron, acetaminophen, oxyCODONE-acetaminophen, HYDROmorphone  DIET CARDIAC; Low Potassium     DVT Prophylaxis: Comadin    Lab and other Data:     Recent Labs     03/31/19  0144 03/31/19 2020 04/01/19  0740   WBC 10.3 13.8* 11.3*   HGB 8.1* 8.2* 7.4*    236 184     Recent Labs     03/31/19  1325 03/31/19 2020 04/01/19  0740   * 136 140   K 5.3* 4.6 4.4    101 102   CO2 25 21* 29   BUN 23 27* 22   CREATININE 0.9 0.9 0.8   GLUCOSE 270* 135* 115*     Recent Labs     03/31/19  1325   AST 31   ALT 49*   BILITOT 0.6   ALKPHOS 66     Troponin T:   Recent Labs     03/29/19  1439 03/29/19  2037   TROPONINI <0.01 <0.01     Pro-BNP: No results for input(s): BNP in the last 72 hours. INR:   Recent Labs     03/30/19  0151 03/31/19  0144 04/01/19  0144   INR 2.38* 1.72* 1.48*     ABGs: No results found for: PHART, PO2ART, FVA9PDY  UA:No results for input(s): NITRITE, COLORU, PHUR, LABCAST, WBCUA, RBCUA, MUCUS, TRICHOMONAS, YEAST, BACTERIA, CLARITYU, SPECGRAV, LEUKOCYTESUR, UROBILINOGEN, BILIRUBINUR, BLOODU, GLUCOSEU, AMORPHOUS in the last 72 hours.     Invalid input(s): Izaiah Lubin    RAD: Noted    Micro: N/A    Patient Active Problem List    Diagnosis Date Noted    Abnormal stress test 01/17/2019     Priority: High    Coronary artery disease 01/17/2019     Priority: High    Hx of CABG 01/17/2019     Priority: High    Hypertension 01/17/2019     Priority: High    H/O heart artery stent 01/17/2019     Priority: High    Chronic anticoagulation 01/17/2019     Priority: High    Avulsion of right hamstring muscle     Atrial fibrillation with RVR (HCC) 03/29/2019    Chronic bilateral low back pain without sciatica 03/20/2019    Palpitations     Chest pain 01/07/2019    Anxiety and depression 01/19/2018    Memory loss 12/11/2017    Anxiety 12/11/2017    Vertigo 02/18/2013    Sleep apnea 08/30/2012    Osteoarthritis of lumbar spine 06/15/2012     replace inactive diagnosis      B12 deficiency 06/15/2012       Assessment/plan: Active Problems:    Hypertension    Chronic anticoagulation    Atrial fibrillation with RVR ([facility])    Avulsion of right hamstring muscle  Resolved Problems:    * No resolved hospital problems. 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 Continue care, see above and orders. 2. Follow CBC and transfuse as needed  3. Coumadin anticoagulation  4. Orthopedic surgery input noted  5. Prognosis fair  6. Disposition continue inpatient care  7.  Discharge disposition: probably home tomorrow if ok with consultants      Linsey Kennedy MD  Hospitalist Service  4/1/2019  2:24 PM

## 2019-04-01 NOTE — PROGRESS NOTES
Junaid Lara MD        HYDROmorphone (DILAUDID) injection 0.5 mg  0.5 mg Intravenous Q3H MARIANELAN Butch Salgado MD   0.5 mg at 03/31/19 0423    pregabalin (LYRICA) capsule 100 mg  100 mg Oral BID Elyse Flores PA-C   100 mg at 04/01/19 0920       PHYSICAL EXAM:    Orientation:  alert and oriented to person, place and time    Incision:  dressing in place, clean, dry, intact, ecchymoses present    Upper Extremity Motor :  deltoids/biceps/triceps/wirst flexion/wrist extension/finger flexion/finger extension 5/5 bilaterally    Upper Motor Neuron Signs:  None    Upper Extremity Sensory:  Intact C3-T1 distribution    Lower Extremity Motor :  quadriceps, extensor hallucis longus, dorsiflexion, plantarflexion 5/5 bilaterally    Lower Extremity Sensory:  Chronic distal numbness    Flatus:  positive    ABNORMAL EXAM FINDINGS:  Hematoma in distal right hamstring region    LABS:    CBC:   Lab Results   Component Value Date    WBC 11.3 04/01/2019    RBC 2.11 04/01/2019    HGB 7.4 04/01/2019    HCT 22.5 04/01/2019    .6 04/01/2019    MCH 35.1 04/01/2019    MCHC 32.9 04/01/2019    RDW 13.2 04/01/2019     04/01/2019    MPV 9.1 04/01/2019     BMP:    Lab Results   Component Value Date     04/01/2019    K 4.4 04/01/2019    K 4.5 01/08/2019     04/01/2019    CO2 29 04/01/2019    BUN 22 04/01/2019    LABALBU 3.3 03/31/2019    CREATININE 0.8 04/01/2019    CALCIUM 8.3 04/01/2019    LABGLOM >60 04/01/2019    GLUCOSE 115 04/01/2019       ASSESSMENT AND PLAN:    Patient Active Problem List    Diagnosis Date Noted    Abnormal stress test 01/17/2019     Priority: High    Coronary artery disease 01/17/2019     Priority: High    Hx of CABG 01/17/2019     Priority: High    Hypertension 01/17/2019     Priority: High    H/O heart artery stent 01/17/2019     Priority: High    Chronic anticoagulation 01/17/2019     Priority: High    Atrial fibrillation with RVR ([facility] [address] 03/29/2019    Chronic bilateral low back pain without sciatica 03/20/2019    Palpitations     Chest pain 01/07/2019    Anxiety and depression 01/19/2018    Memory loss 12/11/2017    Anxiety 12/11/2017    Vertigo 02/18/2013    Sleep apnea 08/30/2012    Osteoarthritis of lumbar spine 06/15/2012    B12 deficiency 06/15/2012       Patient with right distal hamstring avulsion, status post SCS placement, chronic anticoagulation therapy    1:  Activity Level:  Up ad-tony   2:  Pain Control:  Oral analgesia  3:  Discharge Planning:  Home per admitting, stable from spine surgery perspective   4:  Other:  Non-operative distal right hamstring avulsion, Aqua-K pad PRN to this area for pain control and hematoma reabsorption, patient to follow up with us in two weeks outpatient clinic      Electronically signed by Phoenix Blount PA-C on 4/1/19 at 10:30 AM

## 2019-04-01 NOTE — CONSULTS
Inpatient consult to Vascular Surgery  Consult performed by: Caitlin Fiore MD  Consult ordered by: Fariha Romero MD        Vascular Surgery Consultation    I was consulted to see the patient for right lower extremity Deep Vein Thrombosis (DVT). HPI    This is a 72year old  man with right thigh pain, sudden onset a few days ago. He was bridged with lovenox and coumadin restarted as well after back surgery/stimulator. The pain started suddenly at night. He has a lot of bruising in the abdomen around areas of lovenox injections. He has a history of DVT right leg with non retrievable [place] IVC filter placed years ago in [place]. He does have a personal history of hypercoagulability. He does have a family history of hypercoagulability.      Current Medical History    Negrita Panda is a 72 y.o. male with the following history reviewed and recorded in [facility]:  Patient Active Problem List    Diagnosis Date Noted    Abnormal stress test 01/17/2019     Priority: High    Coronary artery disease 01/17/2019     Priority: High    Hx of CABG 01/17/2019     Priority: High    Hypertension 01/17/2019     Priority: High    H/O heart artery stent 01/17/2019     Priority: High    Chronic anticoagulation 01/17/2019     Priority: High    Atrial fibrillation with RVR (HCC) 03/29/2019    Chronic bilateral low back pain without sciatica 03/20/2019    Palpitations     Chest pain 01/07/2019    Anxiety and depression 01/19/2018    Memory loss 12/11/2017    Anxiety 12/11/2017    Vertigo 02/18/2013    Sleep apnea 08/30/2012    Osteoarthritis of lumbar spine 06/15/2012     replace inactive diagnosis      B12 deficiency 06/15/2012     Current Facility-Administered Medications   Medication Dose Route Frequency Provider Last Rate Last Dose    [START ON 4/1/2019] warfarin (COUMADIN) tablet 2.5 mg  2.5 mg Oral Daily Fariha Romero MD        diltiazem 125 mg in dextrose 5 % 125 mL infusion  5 mg/hr Intravenous Continuous Edel Diaz MD   Stopped at 03/29/19 1149    sodium chloride flush 0.9 % injection 10 mL  10 mL Intravenous 2 times per day Rossana Rose MD   10 mL at 03/31/19 2102    sodium chloride flush 0.9 % injection 10 mL  10 mL Intravenous PRN Rossana Rose MD        ondansetron (ZOFRAN) injection 4 mg  4 mg Intravenous Q6H PRN Rossana Rose MD        acetaminophen (TYLENOL) tablet 650 mg  650 mg Oral Q8H PRN Carter Schwartz MD        FLUoxetine (PROZAC) capsule 60 mg  60 mg Oral Daily Carter Schwartz MD   60 mg at 03/31/19 0851    metoprolol succinate (TOPROL XL) extended release tablet 25 mg  25 mg Oral Daily Carter Schwartz MD   25 mg at 03/31/19 0851    oxyCODONE-acetaminophen (PERCOCET) 7.5-325 MG per tablet 1 tablet  1 tablet Oral Q6H PRN Rossana Rose MD   1 tablet at 03/31/19 2102    rosuvastatin (CRESTOR) tablet 10 mg  10 mg Oral Nightly Carter Scwhartz MD   10 mg at 03/31/19 2102    warfarin (COUMADIN) daily dosing (placeholder)   Other RX Placeholder Rossana Rose MD        HYDROmorphone (DILAUDID) injection 0.5 mg  0.5 mg Intravenous Q3H PRN Aletha Cherry MD   0.5 mg at 03/31/19 0423    pregabalin (LYRICA) capsule 100 mg  100 mg Oral BID Phoenix Blount PA-C   100 mg at 03/31/19 2102     Allergies: Iodine; Morphine; and Shellfish-derived products  Past Medical History:   Diagnosis Date    Anxiety 12/11/2017    Atherosclerotic coronary vascular disease     CAD (coronary artery disease)     sees dr. combs    Cervical radiculopathy     Chronic bilateral low back pain without sciatica 3/20/2019    Depression     Diverticular disease     DVT (deep venous thrombosis) (HCC)     Erectile dysfunction     GERD (gastroesophageal reflux disease)     Hyperlipidemia     Hypertension     Morbid obesity ([address]     Obesity     OCD (obsessive compulsive disorder)     Paroxysmal atrial fibrillation ([address]     Unspecified sleep apnea     bpap    Unstable angina pectoris [facility])      Past Surgical History:   Procedure Laterality Date    BACK SURGERY      total x 5    CARDIAC SURGERY  2001 and 2004    stents    CARPAL TUNNEL RELEASE      CATARACT REMOVAL  2005    CORONARY ANGIOPLASTY WITH STENT PLACEMENT  12/07/2004    [facility]    CORON[facility]RAFT  [facility]    [facility]    EYE SURGERY      GASTRIC BYPASS SURGERY  2002    HERNIA REPAIR      IMPLANTATION VAGAL NERVE STIMULATOR N/A 3/20/2019    SPINAL CORD STIMULATOR PERMANENT PLACEMENT performed by Rod Nova DO at [address]  2006    left total knee    KNEE SURGERY Left 1969    meniscus repair in [address] in   [facility] and 1997    lumbar x 2   [address]    neck ?  TONSILLECTOMY      TUMOR REMOVAL Right 1973    foot    VENA CAVA FILTER PLACEMENT      WRIST SURGERY       Family History   Problem Relation Age of Onset    Cancer Mother     Cancer Father      Social History     Tobacco Use    Smoking status: Never Smoker    Smokeless tobacco: Never Used   Substance Use Topics    Alcohol use: Yes     Comment: rarely       Review of Systems    Constitutional - no significant activity change, appetite change, or unexpected weight change. No fever or chills. No diaphoresis or significant fatigue. Eyes - no sudden vision change or amaurosis. Respiratory - no significant shortness of breath, wheezing, or stridor. No apnea, cough, or chest tightness associated with shortness of breath. Cardiovascular - no chest pain, syncope, or significant dizziness. No palpitations. No claudication. Mild right leg edema. Gastrointestinal - no abdominal swelling or pain. No blood in stool. No severe constipation, diarrhea, nausea, or vomiting. Genitourinary - No difficulty urinating, dysuria, frequency, or urgency. No flank pain or hematuria.   Musculoskeletal - no back pain, gait disturbance, or myalgia. Skin - no color change, rash, pallor, or new wound. Neurologic - no dizziness, facial asymmetry, or light headedness. No seizures. No speech difficulty or lateralizing weakness. Psychiatric - no severe anxiety or nervousness. No confusion. All other review of systems are negative. Physical Exam    Constitutional - well developed, well nourished. No diaphoresis or acute distress. HENT - head normocephalic. Neck- ROM appears normal, no tracheal deviation. Cardiovascular - irregular rhythm. Carotid pulses are 2+ to palpation bilaterally without bruit. Extremities - Radial and brachial pulses are 2+ to palpation bilaterally. Right femoral pulse: present 1+; Right DP: present 1+; Right PT present 1+; Left femoral pulse: present 1+;  Left DP: present 1+; Left PT: present 1+  No cyanosis, clubbing. Mild right leg edema. No signs atheroembolic event. Pain and swelling right posterior thigh with ecchymosis  Pulmonary - no labored breathing. no respiratory distress. GI - Abdomen - soft, non tender, bowel sounds X 4 quadrants. No guarding or rebound tenderness. No distension or palpable mass. Genitourinary - deferred. Neurologic - alert and oriented X 3. Physiologic. Skin - warm, dry, and intact. No rash, erythema, or pallor.   Psychiatric - mood, affect, and behavior appear normal.  Judgment and thought processes appear normal.    Radiology/Vascular lab tests:    Narrative   Vascular Lower Extremities DVT Study Procedure        Demographics        Patient Name   Tomma Schwab Age                   72        Patient Number  [id number]           Gender                Male        Visit Number    [id number]        Jaison Bautista MD                                     Physician        Date of Birth   1945       Referring Physician   Hildegarde Schilder        Accession       [id number]        Sonographer           Bishop Stovall T    Number     Procedure   Type of Study:        Veins:Lower Extremities DVT Study, VL EXTREMITY VENOUS DUPLEX RIGHT.       Indications for Study:Pain, right lower extremity and Edema, right lower   extremity.        Impression        There is evidence of chronic deep vein thrombosis in the right lower    extremity involving the distal femoral vein.    Chronic superficial thrombophlebitis is seen in the short saphenous vein    of the right lower extremity(ies).        Signature        ----------------------------------------------------------------    Electronically signed by Patti Gunter MD(Interpreting    physician) on 03/29/2019 07:45 AM    ----------------------------------------------------------------       Velocities are measured in cm/s ; Diameters are measured in mm       Right Lower Extremities DVT Study Measurements   Right 2D Measurements   +------------------------------------+----------+---------------+----------+   ! Location                            ! Visualized! Compressibility! Thrombosis! +------------------------------------+----------+---------------+----------+   ! Sapheno Femoral Junction            ! Yes       ! Yes            !None      !   +------------------------------------+----------+---------------+----------+   ! Common Femoral                      ! Yes       ! Yes            !None      !   +------------------------------------+----------+---------------+----------+   ! Prox Femoral                        ! Yes       ! Yes            !None      !   +------------------------------------+----------+---------------+----------+   ! Mid Femoral                         ! Yes       ! Yes            !None      !   +------------------------------------+----------+---------------+----------+   ! Dist Femoral                        ! Yes       !No             ! Chronic   !   +------------------------------------+----------+---------------+----------+   ! Deep Femoral                        ! Yes       ! Yes            !None      !   +------------------------------------+----------+---------------+----------+   ! Popliteal                           ! Yes       ! Yes            !None      !   +------------------------------------+----------+---------------+----------+   ! SSV                                 ! Yes       !No             ! Chronic   !   +------------------------------------+----------+---------------+----------+   ! Gastroc                             ! Yes       ! Yes            !None      !   +------------------------------------+----------+---------------+----------+   ! PTV                                 ! Yes       ! Yes            !None      !   +------------------------------------+----------+---------------+----------+   ! GSV                                 ! Yes       ! Yes            !None      !   +------------------------------------+----------+---------------+----------+   ! ATV                                 ! Yes       ! Yes            !None      !   +------------------------------------+----------+---------------+----------+   ! Peroneal                            ! Yes       ! Yes            !None      !   +------------------------------------+----------+---------------+----------+         CT abdomen/pelvis without IV contrast - right posterior thigh fluid collection    Assessment    1. Atrial fibrillation with RVR ([address]    2. Acute pain of right lower extremity        He has chronic partially occlusive right lower extremity Deep Vein Thrombosis. He has a posterior thigh bleed/hematoma by history and exam in addition to CT findings. He has swelling right leg from hematoma and chronic swelling with history of DVT. He has a non retrievable [place] filter placed in [place] years ago. Plan    Hold all anticoagulation until we are sure that bleeding has stopped. He does have a history of DVT and Afib, so will not reverse anticoagulation at this time. He already has an IVC filter. CTV Sunday.   Will pretreat for contrast allergy.

## 2019-04-01 NOTE — PROGRESS NOTES
Patient name: Christa Gerber  Patient : 1945  8:45 AM  ROOM 721    CC: Follow-up GEE deficiency     Patient Active Problem List   Diagnosis Code    Osteoarthritis of lumbar spine M47.816    B12 deficiency E53.8    Sleep apnea G47.30    Vertigo R42    Memory loss R41.3    Anxiety F41.9    Anxiety and depression F41.9, F32.9    Chest pain R07.9    Palpitations R00.2    Abnormal stress test R94.39    Coronary artery disease I25.10    Hx of CABG Z95.1    Hypertension I10    H/O heart artery stent Z95.5    Chronic anticoagulation Z79.01    Chronic bilateral low back pain without sciatica M54.5, G89.29    Atrial fibrillation with RVR ([facility]) I48.91       Subjective: Denies active bleeding. Less discomfort to right thigh. HISTORY OF PRESENT ILLNESS:   Mr. Yong Travis is a pleasant 60-year-old  gentleman who is presently admitted with right lower extremity thigh pain with decreased mobility. Jeana Gallagher had pain/stimulator placed to the left back for chronic back and bilateral leg pain by Dr. Cesar Cristobal on 3/20/2019. He has a history of atrial fibrillation and GEE, a rare deficiency requiring chronic anticoagulation therapy with warfarin and has a [place] filter in place. For the procedure he was placed on Lovenox and resumed warfarin with an INR of 2.6 on 3/28/2019. Jeana Gallagher is presently admitted for pain control of his right lower extremity.     Venous duplex of RLE on 3/28/2019- Chronic deep vein thrombosis involving the distal femoral and chronic superficial thrombophlebitis in short saphous vein     CT of right femur on 3/29/2019- Severe right hamstring tendinosis with ischiogluteal bursitis. Nonspecific elongated fluid collection in the central right thigh. Intact right femur without fracture or dislocation of the hip or knee joints      Review of Systems   Constitutional: Positive for fatigue. Negative for chills, diaphoresis and fever. HENT: Negative.   Negative for congestion, ear pain, hearing loss, nosebleeds, sore throat and tinnitus. Eyes: Negative. Negative for pain, discharge and redness. Respiratory: Positive for shortness of breath. Negative for cough and wheezing. Cardiovascular: Negative. Negative for chest pain, palpitations and leg swelling. Gastrointestinal: Negative. Negative for abdominal pain, blood in stool, constipation, diarrhea, nausea and vomiting. Endocrine: Negative for polydipsia. Genitourinary: Negative for dysuria, flank pain, frequency, hematuria and urgency. Musculoskeletal: Positive for back pain and joint swelling. Negative for myalgias and neck pain. Skin: Negative. Negative for rash. Bruising abdomen/ RLE   Neurological: Positive for weakness. Negative for dizziness, tremors, seizures and headaches. Hematological: Does not bruise/bleed easily. Psychiatric/Behavioral: Negative. The patient is not nervous/anxious. Current Pain Assessment  Pain Assessment  Pain Assessment: 0-10  Pain Level: 5  Response to Pain Intervention: Patient Satisfied    OBJECTIVE:  VITALS: /64   Pulse 63   Temp 98 °F (36.7 °C) (Temporal)   Resp 16   Ht 5' 8\" (1.727 m)   Wt 266 lb 3.2 oz (120.7 kg)   SpO2 95%   BMI 40.48 kg/m²   I&O:     Intake/Output Summary (Last 24 hours) at 4/1/2019 0845  Last data filed at 4/1/2019 6873  Gross per 24 hour   Intake 2620 ml   Output 1400 ml   Net 1220 ml         Physical Exam   Constitutional: He is oriented to person, place, and time. He appears well-developed and well-nourished. No distress. HENT:   Head: Normocephalic and atraumatic. Mouth/Throat: Oropharynx is clear and moist.   Eyes: Conjunctivae are normal. Right eye exhibits no discharge. Left eye exhibits no discharge. No scleral icterus. Neck: Neck supple. No JVD present. No tracheal deviation present. Cardiovascular: Normal rate, regular rhythm, normal heart sounds and intact distal pulses.  Exam reveals no gallop results for input(s): Jovanna Robledo in the last 720 hours. Organism Recent : No results for input(s): ORG in the last 720 hours. Radiology:   Ct Abdomen Pelvis Wo Contrast Additional Contrast? None    Result Date: 3/29/2019  Examination. CT ABDOMEN PELVIS WO CONTRAST History: The patient complains of abdominal pain. History of retroperitoneal hematoma. DLP: 1742 mGycm. The CT scan of the abdomen and pelvis is performed without oral or intravenous contrast enhancement. The images are acquired in axial plane with subsequent reconstruction in coronal and sagittal planes. There is no previous study for comparison. The lung bases included in the study show scarring and areas of discoid atelectasis in the lower lungs bilaterally. A tiny, 2 mm, subpleural nodule is seen in the right lower lobe laterally, image #6 in axial plane. Severe atheromatous changes of the coronary arteries and the visualized thoracic aorta are noted. The unenhanced liver and spleen appear unremarkable. A markedly distended gallbladder seen no gallstones. Common bile duct is normal. The pancreas is normal. The adrenal glands bilaterally are normal. There is marked lobulation of renal contour bilaterally suggesting chronic renal cortical scarring. There are bilateral peripelvic renal cysts, more pronounced on the right side. No hydronephrosis. The ureters bilaterally are normal. The urinary bladder is moderately well distended. No intrinsic abnormality. There is a previous prostatectomy surgery. The small fat-containing inguinal hernias bilaterally are seen, left larger than the right. Postsurgical changes of the stomach are noted. The duodenum is normal. The small bowel is normal. A normal appendix is seen. Moderate gas and stool are seen in the colon. There is diverticulosis of the distal colon. No evidence for diverticulitis. The atheromatous changes of the abdominal aorta and iliac arteries are seen. No aneurysmal dilatation.  An inferior vena cava filter seen in place with distal end opposite mid body of L2 There is no evidence of abdominal or pelvic lymphadenopathy. Chronic degenerative changes of the lumbar spine are seen with evidence of hardware fusion of L5-S1. A dorsal column stimulator electrodes are seen in place. There is a mild dextroscoliosis. No focal bony abnormality or a bone lesion. There is evidence of upper abdominal midline fat-containing ventral hernia. There is no evidence of retroperitoneal hematoma. No acute abnormality of the abdomen or pelvis. No evidence of retroperitoneal hematoma. A very distended gallbladder without stones. This may be further evaluated with radionuclide hepatobiliary imaging. The appendix is normal. The diverticulosis of the distal colon. No evidence for diverticulitis. Postsurgical changes of the stomach. No focal complication. The above finding are recorded on a digital voice clip in PACS. Signed by Dr Erin Bolanos on 3/29/2019 8:14 AM    Xr Lumbar Spine (2-3 Views)    Result Date: 3/20/2019  History: 82-year-old with spinal cord stimulator. Reference: None. FINDINGS: 2 intraoperative fluoroscopic digital store images at the thoracolumbar junction demonstrate dorsal column stimulator leads. Total images 2. Fluoroscopic time not available. Signed by Dr Rohan Roth on 3/20/2019 10:25 PM    Cta Abdominal Aorta W Bilat Runoff W Wo Contrast    Result Date: 3/31/2019  Indication 72year-old with back pain. Technique Spiral imaging was performed through the abdomen, the pelvis, and both lower extremities during uneventful administration of intravenous contrast, with bolus optimized for arterial evaluation. 3-D MIP reformatted images were obtained. Automated exposure control was utilized to limit radiation dose. DLP 3620 mGy-cm Findings ABDOMEN/PELVIS Mild bronchiectasis in the lung bases with minimal atelectasis. Previous median sternotomy. The heart is enlarged. No pericardial effusion.  No liver or splenic masses. Gallbladder is distended. Postoperative changes of the stomach. No adrenal mass. No obstructive uropathy. No acute abnormality of the bowel. Evidence of previous small bowel surgery. Colonic diverticulosis. Dorsal column stimulator lead. ABDOMINAL AORTA There is mild atherosclerosis of the abdominal aorta without aneurysm or dissection. Celiac axis and SMA are widely patent. No renal artery stenosis is seen. The inferior mesenteric artery is patent. There is diffuse asymmetric swelling of the entire right leg. I suspect a hematoma in the posterior compartment right side. I cannot determine if this is an INTRAmuscular hematoma or an INTERmuscular hematoma. RIGHT LOWER EXTREMITY Mild atherosclerosis of the right common iliac artery which is tortuous proximally. No focal stenosis however. Right external iliac artery is widely patent with minimal plaque. Nonstenotic CFA. Mild diffuse calcific plaque of the SFA without stenosis. Popliteal artery is normal. There is trifurcation disease. Mild/moderate stenosis of the tibioperoneal trunk. Severe multifocal stenoses of the anterior tibial artery. Multifocal severe stenoses of the posterior tibial artery. There is difficulty evaluating the distal tibial vessels due to the calcific plaque. I do believe there is three-vessel runoff to the right foot however. LEFT LOWER EXTREMITY Mild atherosclerosis of the common iliac artery which is nonstenotic. Nonstenotic external iliac artery and CFA with mild atherosclerosis. Mild to moderate atherosclerosis of the distal SFA without significant luminal narrowing. Portions of the left popliteal artery is obscured by streak artifact from the left knee arthroplasty. Otherwise no popliteal artery stenosis is suspected. Trifurcation disease. Mild multifocal stenoses of the anterior/posterior tibial arteries and probably artery. There is three-vessel runoff at the ankle.     1. Asymmetric right leg swelling with hematoma in the posterior compartment right thigh. I suspect this may all be related to an acute hamstrings avulsion injury. There is a 2 cm ossific fragment in the mid/distal thigh, possibly the avulsed and retracted fragment. Confirmation with MRI may be obtained. 2. Trifurcation and tibial disease with three-vessel runoff at both ankles. Signed by Dr Carol Shepard on 3/31/2019 10:10 AM    Ct Femur Right Wo Contrast    Addendum Date: 3/29/2019    Addendum: There is also superficial soft tissue swelling laterally and posteriorly at the level of the mid and distal femoral shaft, correlate for cellulitis. There is no superficial abscess identified. Signed by Dr Carol Shepard on 3/29/2019 8:48 AM    Result Date: 3/29/2019  ORIGINAL REPORT  History: 77-year-old with swelling and induration. Reference: None. TECHNIQUE: Unenhanced CT imaging of the right femur. Automated exposure control was utilized to limit radiation dose. DLP 1459 mGy-cm Findings: The right femur is intact. No fracture. No bone lesion or periosteal reaction. No cortical destruction. No right hip fracture or dislocation. Very mild osteoarthritis of the right hip. Right superior and inferior pubic rami are intact. There is severe hamstrings insertional tendinosis with asymmetric soft tissue density at the site of insertion. There is nonspecific fluid collection centrally in the right thigh musculature between the medial abductor muscles and semitendinosis. This collection measures approximately 3.7 cm in transverse dimension by 11 cm in craniocaudal dimension by 1.7 cm in AP dimension. Vascular calcifications. 1. Intact right femur without fracture or dislocation of the hip or knee joints. 2. Nonspecific elongated fluid collection in the central right thigh. Consider muscular tear as cause but this is nonspecific. MRI is recommended. 3. Severe right hamstring tendinosis with ischiogluteal bursitis.  Signed by Dr Carol Shepard on 3/29/2019 8:43 AM ADDENDUM #1     Vl Extremity Venous Right    Result Date: 3/29/2019  Vascular Lower Extremities DVT Study Procedure  Demographics   Patient Name    Shawn Scott Age                   72   Patient Number  [id number]           Gender                Male   Visit Number    [id number]        Interpreting          Sienna Peck MD                                   Physician   Date of Birth   1945       Referring Physician   Sofía Marmolejo   Accession       [id number]        [address] [place] [address]  Number  Procedure Type of Study:   Veins:Lower Extremities DVT Study, VL EXTREMITY VENOUS DUPLEX RIGHT. Indications for Study:Pain, right lower extremity and Edema, right lower extremity. Impression   There is evidence of chronic deep vein thrombosis in the right lower  extremity involving the distal femoral vein. Chronic superficial thrombophlebitis is seen in the short saphenous vein  of the right lower extremity(ies).    Signature   ----------------------------------------------------------------  Electronically signed by Sienna Peck MD(Interpreting  physician) on 03/29/2019 07:45 AM        Medications  Current Facility-Administered Medications   Medication Dose Route Frequency Provider Last Rate Last Dose    warfarin (COUMADIN) tablet 2.5 mg  2.5 mg Oral Daily Doc Garrison MD        diltiazem 125 mg in dextrose 5 % 125 mL infusion  5 mg/hr Intravenous Continuous Salomon Sood MD   Stopped at 03/29/19 1149    sodium chloride flush 0.9 % injection 10 mL  10 mL Intravenous 2 times per day Sarthak Love MD   10 mL at 03/31/19 2102    sodium chloride flush 0.9 % injection 10 mL  10 mL Intravenous PRN Carter Schwartz MD        ondansetron (ZOFRAN) injection 4 mg  4 mg Intravenous Q6H PRN Sarthak Love MD        acetaminophen (TYLENOL) tablet 650 mg  650 mg Oral Q8H PRN Carter Schwartz MD        FLUoxetine (PROZAC) capsule 60 mg  60 mg Oral Daily Carter Schwartz MD   60 mg at 03/31/19 0851    metoprolol succinate (TOPROL XL) extended release tablet 25 mg  25 mg Oral Daily Carter Schwartz MD   25 mg at 03/31/19 0851    oxyCODONE-acetaminophen (PERCOCET) 7.5-325 MG per tablet 1 tablet  1 tablet Oral Q6H PRN Lyn Campbell MD   1 tablet at 03/31/19 2102    rosuvastatin (CRESTOR) tablet 10 mg  10 mg Oral Nightly Carter Schwartz MD   10 mg at 03/31/19 2102    warfarin (COUMADIN) daily dosing (placeholder)   Other RX Placeholder Lyn Campbell MD        HYDROmorphone (DILAUDID) injection 0.5 mg  0.5 mg Intravenous Q3H PRN Tam Gomez MD   0.5 mg at 03/31/19 0423    pregabalin (LYRICA) capsule 100 mg  100 mg Oral BID Donal Santoro PA-C   100 mg at 03/31/19 2102       Allergies  Iodine; Morphine; and Shellfish-derived products      ASSESSMENT/PLAN:  GEE Homozygous Gene Mutation, chronic anticoagulation with Warfarin (managed by Dr. Lowell Joel)   - INR 1.48/PT 17.2  - Warfarin 7.25 mg daily- on HOLD per IM    - Chronic indwelling IVC Deep[place] filter in place.  - Medical records requested from [facility], Dr. Dhruv Smith Bi being obtained.   This may have been Odalys Saldana in [place].     Macrocytic anemia - likely related to acute blood loss  - HgB 7.4  - .6  - follow     Serology studies on 3/29/2019:  Ferritin 108.5  Iron 87  Iron saturation 29%  TIBC 296  Folate 14.7  Vitamin B12 795  Reticulocyte count 0.1022    Serology studies requested on 3/31/2019: Done due to drop in Hgb with increase in MCV  Repeat reticulocyte count - 0.1033 (4.57)  LDH - 237  Haptoglobin - 136  CMP stable, aside from   TSH - 0.54   Occult stool - needs to be collected    May need to consider bone marrow aspiration and biopsy due to elevated MCV without etiology to R/O MDS.     RLE pain- IM and vascular managing  - Venous duplex of RLE on 3/28/2019- Chronic deep vein thrombosis involving the distal femoral and chronic superficial thrombophlebitis in short saphous vein  - CT of right femur on 3/29/2019- Severe right hamstring tendinosis with ischiogluteal bursitis. Nonspecific elongated fluid collection in the central right thigh. Intact right femur without fracture or dislocation of the hip or knee joints.        Discussed with patient and his spouse.       SANTY Perez    04/01/19  8:45 AM

## 2019-04-01 NOTE — PROGRESS NOTES
Bethanie Uribe M.D. Daily Progress Note    Pt Name: Peter 80 Record Number: [id number]  Date of Birth 1945   Today's Date: 3/31/2019    Chief Complaint:  Chief Complaint   Patient presents with    Post-op Problem     had pain stimulator placed one week ago, here, developed right thigh pain yesterday, unable to walk  on right leg, painful, thinks he may have a blood cloot. has hx PE, DVT'S,  has green field filter       SUBJECTIVE:     Patient was seen and examined. Pain is  Controlled and seems to be improving slightly  No complaints     OBJECTIVE:     Patient Vitals for the past 24 hrs:   BP Temp Temp src Pulse Resp SpO2 Weight   03/31/19 1758 127/66 97.9 °F (36.6 °C) Temporal 86 20 97 % --   03/31/19 1333 127/62 98.2 °F (36.8 °C) Temporal 84 20 96 % --   03/31/19 1020 (!) 103/52 97.5 °F (36.4 °C) Temporal 77 20 97 % --   03/31/19 0851 -- -- -- 75 -- -- --   03/31/19 0559 (!) 119/58 97 °F (36.1 °C) Temporal 71 14 97 % --   03/31/19 0400 -- -- -- -- -- -- 263 lb 12.8 oz (119.7 kg)   03/31/19 0000 (!) 113/59 98 °F (36.7 °C) Temporal 82 16 95 % --         Intake/Output Summary (Last 24 hours) at 3/31/2019 2159  Last data filed at 3/31/2019 2114  Gross per 24 hour   Intake 2280 ml   Output 875 ml   Net 1405 ml       In: 2280 [P.O.:2280]  Out: 456 [Urine:875]    I/O last 3 completed shifts: In: 2080 [P.O.:2080]  Out: 985 [Urine:875]     Date 03/31/19 0000 - 03/31/19 2359   Shift 8325-8905 1981-4476 7018-6006 24 Hour Total   INTAKE   P.O.(mL/kg/hr) 200(0.2) 800(0.8) 1280 2280   Shift Total(mL/kg) 200(1.7) 800(6.7) 1280(10.7) 2280(19.1)   OUTPUT   Urine(mL/kg/hr) 575(0.6) 300(0.3)  875   Shift Total(mL/kg) 575(4.8) 300(2.5)  875(7.3)   Weight (kg) 119.7 119.7 119.7 119.7       Wt Readings from Last 3 Encounters:   03/31/19 263 lb 12.8 oz (119.7 kg)   03/19/19 263 lb (119.3 kg)   02/27/19 263 lb (119.3 kg)        Body mass index is 40.11 kg/m².      Diet: DIET CARDIAC; Low

## 2019-04-01 NOTE — PROGRESS NOTES
Stopped (03/29/19 1149)     PRN Meds:  sodium chloride flush 10 mL PRN   ondansetron 4 mg Q6H PRN   acetaminophen 650 mg Q8H PRN   oxyCODONE-acetaminophen 1 tablet Q6H PRN   HYDROmorphone 0.5 mg Q3H PRN         PHYSICAL EXAM:     CONSTITUTIONAL: Alert and oriented times 3, no acute distress and cooperative to examination. HEENT: Head is normocephalic, atraumatic. EOMI, PERRLA  NECK: Soft, trachea midline and straight  LUNGS: Chest expands equally bilaterally upon respiration, no accessory muscle used. Ausculation reveals no wheezes, rales or rhonchi. CARDIOVASCULAR: Heart sounds are normal.  Regular rate and rhythm without murmur, gallop or rub. ABDOMEN: soft, nontender, nondistended, no masses or organomegaly  NEUROLOGIC: CN II-XII are grossly intact. There are no focalizing motor or sensory deficits  EXTREMITY: RLE with ecchymosis posterior thigh extending behind knee and calf. Pedal pulses palpated.       LABS:     CBC: Recent Labs     03/31/19  0144 03/31/19  1325 03/31/19 2020 04/01/19  0740   WBC 10.3  --  13.8* 11.3*   RBC 2.38*  --  2.39* 2.11*   HGB 8.1*  --  8.2* 7.4*   HCT 25.6* 24.0* 25.4* 22.5*   .6*  --  106.3* 106.6*   MCH 34.0*  --  34.3* 35.1*   MCHC 31.6*  --  32.3* 32.9*   RDW 13.2  --  12.9 13.2     --  236 184   MPV 10.1  --  9.4 9.1*      Last 3 CMP:   Recent Labs     03/31/19  1325 03/31/19 2020 04/01/19  0740   * 136 140   K 5.3* 4.6 4.4    101 102   CO2 25 21* 29   BUN 23 27* 22   CREATININE 0.9 0.9 0.8   GLUCOSE 270* 135* 115*   CALCIUM 8.7* 8.9 8.3*   PROT 5.8*  --   --    LABALBU 3.3*  --   --    BILITOT 0.6  --   --    ALKPHOS 66  --   --    AST 31  --   --    ALT 49*  --   --       Troponin:   Recent Labs     03/29/19  1439 03/29/19 2037   TROPONINI <0.01 <0.01     Calcium:   Lab Results   Component Value Date    CALCIUM 8.3 04/01/2019    CALCIUM 8.9 03/31/2019    CALCIUM 8.7 03/31/2019      INR:   Recent Labs     03/30/19  0151 03/31/19  0144 04/01/19  0144   INR 2.38* 1.72* 1.48*         DVT prophylaxis: Coumadin - no Lovenox bridge 2' to RLE hematoma. ASSESSMENT:     1. Distal Hamstring Avulsion, Right (S/P SCS Placement)  2. Paroxysmal Atrial Fibrillation on Chronic Anticoagulation with Coumadin  3. Essential HTN  4. CAD with PMHx CABG  5. Obstructive Sleep Apnea - CPAP        PLAN:     1. Ok to D/C Home when ok with Orthopedic. Will sign off. Call again if needed.        SANTY Beverly

## 2019-04-02 LAB
ANION GAP SERPL CALCULATED.3IONS-SCNC: 8 MMOL/L (ref 7–19)
APTT: 24.1 SEC (ref 26–36.2)
BUN BLDV-MCNC: 24 MG/DL (ref 8–23)
CALCIUM SERPL-MCNC: 7.9 MG/DL (ref 8.8–10.2)
CHLORIDE BLD-SCNC: 103 MMOL/L (ref 98–111)
CO2: 28 MMOL/L (ref 22–29)
CREAT SERPL-MCNC: 0.8 MG/DL (ref 0.5–1.2)
GFR NON-AFRICAN AMERICAN: >60
GLUCOSE BLD-MCNC: 102 MG/DL (ref 74–109)
HCT VFR BLD CALC: 25.4 % (ref 42–52)
HEMOGLOBIN: 8.3 G/DL (ref 14–18)
INR BLD: 1.24 (ref 0.88–1.18)
MCH RBC QN AUTO: 34.4 PG (ref 27–31)
MCHC RBC AUTO-ENTMCNC: 32.7 G/DL (ref 33–37)
MCV RBC AUTO: 105.4 FL (ref 80–94)
PDW BLD-RTO: 14.4 % (ref 11.5–14.5)
PLATELET # BLD: 189 K/UL (ref 130–400)
PMV BLD AUTO: 9.2 FL (ref 9.4–12.4)
POTASSIUM SERPL-SCNC: 4.2 MMOL/L (ref 3.5–5)
PROTHROMBIN TIME: 15 SEC (ref 12–14.6)
RBC # BLD: 2.41 M/UL (ref 4.7–6.1)
SODIUM BLD-SCNC: 139 MMOL/L (ref 136–145)
WBC # BLD: 9.6 K/UL (ref 4.8–10.8)

## 2019-04-02 PROCEDURE — 6370000000 HC RX 637 (ALT 250 FOR IP): Performed by: INTERNAL MEDICINE

## 2019-04-02 PROCEDURE — 2140000000 HC CCU INTERMEDIATE R&B

## 2019-04-02 PROCEDURE — 97165 OT EVAL LOW COMPLEX 30 MIN: CPT

## 2019-04-02 PROCEDURE — 36415 COLL VENOUS BLD VENIPUNCTURE: CPT

## 2019-04-02 PROCEDURE — 85730 THROMBOPLASTIN TIME PARTIAL: CPT

## 2019-04-02 PROCEDURE — 6370000000 HC RX 637 (ALT 250 FOR IP): Performed by: PHYSICIAN ASSISTANT

## 2019-04-02 PROCEDURE — 97161 PT EVAL LOW COMPLEX 20 MIN: CPT

## 2019-04-02 PROCEDURE — 80048 BASIC METABOLIC PNL TOTAL CA: CPT

## 2019-04-02 PROCEDURE — 85027 COMPLETE CBC AUTOMATED: CPT

## 2019-04-02 PROCEDURE — 2580000003 HC RX 258: Performed by: INTERNAL MEDICINE

## 2019-04-02 PROCEDURE — 85610 PROTHROMBIN TIME: CPT

## 2019-04-02 PROCEDURE — 99232 SBSQ HOSP IP/OBS MODERATE 35: CPT | Performed by: FAMILY MEDICINE

## 2019-04-02 PROCEDURE — 97535 SELF CARE MNGMENT TRAINING: CPT

## 2019-04-02 RX ADMIN — OXYCODONE HYDROCHLORIDE AND ACETAMINOPHEN 1 TABLET: 7.5; 325 TABLET ORAL at 08:56

## 2019-04-02 RX ADMIN — Medication 10 ML: at 08:56

## 2019-04-02 RX ADMIN — PREGABALIN 100 MG: 50 CAPSULE ORAL at 20:37

## 2019-04-02 RX ADMIN — PREGABALIN 100 MG: 50 CAPSULE ORAL at 08:56

## 2019-04-02 RX ADMIN — OXYCODONE HYDROCHLORIDE AND ACETAMINOPHEN 1 TABLET: 7.5; 325 TABLET ORAL at 20:40

## 2019-04-02 RX ADMIN — METOPROLOL SUCCINATE 25 MG: 25 TABLET, EXTENDED RELEASE ORAL at 08:55

## 2019-04-02 RX ADMIN — OXYCODONE HYDROCHLORIDE AND ACETAMINOPHEN 1 TABLET: 7.5; 325 TABLET ORAL at 14:00

## 2019-04-02 RX ADMIN — FLUOXETINE 60 MG: 20 CAPSULE ORAL at 08:55

## 2019-04-02 RX ADMIN — ROSUVASTATIN CALCIUM 10 MG: 10 TABLET, FILM COATED ORAL at 20:37

## 2019-04-02 RX ADMIN — Medication 10 ML: at 20:40

## 2019-04-02 ASSESSMENT — ENCOUNTER SYMPTOMS
ABDOMINAL PAIN: 0
VOMITING: 0
BLOOD IN STOOL: 0
EYE DISCHARGE: 0
SHORTNESS OF BREATH: 1
GASTROINTESTINAL NEGATIVE: 1
BACK PAIN: 1
COUGH: 0
CONSTIPATION: 0
NAUSEA: 0
EYE PAIN: 0
WHEEZING: 0
EYE REDNESS: 0
DIARRHEA: 0
SORE THROAT: 0
EYES NEGATIVE: 1

## 2019-04-02 ASSESSMENT — PAIN SCALES - GENERAL
PAINLEVEL_OUTOF10: 3
PAINLEVEL_OUTOF10: 2
PAINLEVEL_OUTOF10: 6
PAINLEVEL_OUTOF10: 4
PAINLEVEL_OUTOF10: 7
PAINLEVEL_OUTOF10: 6
PAINLEVEL_OUTOF10: 7

## 2019-04-02 ASSESSMENT — PAIN DESCRIPTION - PAIN TYPE: TYPE: ACUTE PAIN

## 2019-04-02 ASSESSMENT — PAIN DESCRIPTION - LOCATION
LOCATION: KNEE
LOCATION: LEG;BACK

## 2019-04-02 ASSESSMENT — PAIN DESCRIPTION - ORIENTATION
ORIENTATION: RIGHT;POSTERIOR
ORIENTATION: RIGHT

## 2019-04-02 NOTE — PROGRESS NOTES
hamstring avulsion (non op)      Restrictions  Restrictions/Precautions  Restrictions/Precautions: Fall Risk  Position Activity Restriction  Other position/activity restrictions: Patient has an LSO. His understanding is that he only needs it outdoors. Recommended he clarify with his physician.     Subjective   General  Chart Reviewed: Yes  Patient assessed for rehabilitation services?: Yes  Family / Caregiver Present: No  Pain Assessment  Pain Assessment: 0-10  Pain Level: 7  Pain Type: Acute pain  Pain Location: Leg, Back  Pain Orientation: Right  Non-Pharmaceutical Pain Intervention(s): Ambulation/Increased Activity, Repositioned  Response to Pain Intervention: Patient Satisfied     Social/Functional History  Social/Functional History  Lives With: Spouse  Type of Home: House  Home Layout: Performs ADL's on one level, Two level  Home Access: Stairs to enter without rails  Entrance Stairs - Number of Steps: 2  Bathroom Shower/Tub: Walk-in shower  Bathroom Toilet: (comfort height)  Bathroom Equipment: Built-in shower seat  Home Equipment: Rolling walker  ADL Assistance: Independent  Homemaking Assistance: Independent  Ambulation Assistance: Independent  Transfer Assistance: Independent  Active : Yes       Objective   Vision Exceptions: Wears glasses for reading  Hearing: Within functional limits    Orientation  Overall Orientation Status: Within Normal Limits  Observation/Palpation  Palpation: tender to palpate lateral thigh  Edema: mild swelling distal lateral hamstring  Balance  Sitting Balance: Independent  Standing Balance: Supervision  Tub Transfers  Tub Transfers: Supervision  ADL  Feeding: Independent  Grooming: Independent  UE Bathing: Independent  LE Bathing: Supervision(instructed in AE/DME)  UE Dressing: Independent  LE Dressing: Supervision(unable to lift and cross RLE, instructed in AE use)  Toileting: Supervision        Bed mobility  Supine to Sit: Modified independent  Sit to Supine: Modified

## 2019-04-02 NOTE — CARE COORDINATION
[address] Transitions Interview     2019    Patient: Masood Doan Patient : 1945   MRN: [id number]  Reason for Admission:  RARS: Readmission Risk Score: 12         Spoke with: Masood Doan        Readmission Risk  Patient Active Problem List   Diagnosis    Osteoarthritis of lumbar spine    B12 deficiency    Sleep apnea    Vertigo    Memory loss    Anxiety    Anxiety and depression    Chest pain    Palpitations    Abnormal stress test    Coronary artery disease    Hx of CABG    Hypertension    H/O heart artery stent    Chronic anticoagulation    Chronic bilateral low back pain without sciatica    Atrial fibrillation with RVR ([address]    Avulsion of right hamstring muscle       Inpatient Assessment  Care Transitions Summary    Care Transitions Inpatient Review  Medication Review  Are you able to afford your medications?:  Yes  How often do you have difficulty taking your medications?:  I always take them as prescribed. Housing Review  Who do you live with?:  Partner/Spouse/SO  Are you an active caregiver in your home?:  No  Social Support  Do you have a ?:  No  Do you have a [facility]?:  No  Durable Medical Equipment  Patient DME:  Straight cane, Walker  Functional Review  Ability to seek help/take action for Emergent/Urgent situations i.e. fire, crime, inclement weather or health crisis. :  Independent  Ability handle personal hygiene needs (bathing/dressing/grooming): Independent  Ability to manage medications: Independent  Ability to prepare food:  Independent  Ability to maintain home (clean home, laundry): Independent  Ability to drive and/or has transportation:  Independent  Ability to do shopping:  Independent  Ability to manage finances: Independent  Is patient able to live independently?:  Yes  Hearing and Vision  Visual Impairment:  Reading glasses  Hearing Impairment:  None  Care Transitions Interventions         Follow Up :  Met at bedside with Mr. Ravi Jacobson, discussed BPCI-A process and presented the CMS Beneficiary Letter. Contact information given. Piter Ford is agreeable with appropriate follow up after discharge. Patient states he lives at home with his wife, but she is not present during face to face interview. He has a walker and cane DME in the home. He states he is independent of ADL's, medications, and finances. Will follow as inpatient and at discharge. Future Appointments   Date Time Provider Jon Pompa   5/16/2019  1:15 PM Cale Rivers MD [facility]   8/5/2019 10:30 AM SANTY Ivy [facility]       Health Maintenance  There are no preventive care reminders to display for this patient.     Andrae Lopez RN

## 2019-04-02 NOTE — PROGRESS NOTES
[facility]      Patient:  Negrita Panda  YOB: 1945  Date of Service: 4/2/2019  MRN: [id number]   Acct: [de-identified]   Primary Care Physician: Milena Rose MD  Advance Directive: Full Code  Admit Date: 3/28/2019       Hospital Day: 4    CHIEF COMPLAINT Right thigh pain    Subjective:  Resting comfortably, tolerating pain. Concerned with wife suggestion of going to swing bed. Objective:   VITALS:  BP (!) 106/57   Pulse 72   Temp 97.6 °F (36.4 °C) (Temporal)   Resp 20   Ht 5' 8\" (1.727 m)   Wt 263 lb 12.8 oz (119.7 kg)   SpO2 95%   BMI 40.11 kg/m²   24HR INTAKE/OUTPUT:      Intake/Output Summary (Last 24 hours) at 4/2/2019 1111  Last data filed at 4/2/2019 0819  Gross per 24 hour   Intake 1380.42 ml   Output --   Net 1380.42 ml     Constitutional: Oriented to person, place, and time. Well-developed and well-nourished. No distress. HENT:  Head: Normocephalic and atraumatic.    Mouth/Throat: Oropharynx is clear and moist. No oropharyngeal exudate. Eyes: Conjunctivae and EOM are normal. Pupils are equal, round, and reactive to light. No scleral icterus. Neck: Normal range of motion. Neck supple. No JVD present. No tracheal deviation present. Cardiovascular: Normal rate, regular rhythm and normal heart sounds.  Exam reveals no gallop and no friction rub.     Pulmonary/Chest: Effort normal and breath sounds normal. No stridor. No respiratory distress. No wheezes. No rales. Abdominal: Soft. Bowel sounds are normal. No distension and no mass. There is no tenderness. There is no rebound and no guarding. Musculoskeletal: Normal range of motion. Right upper thigh with hematoma, less swollen today  Neurological: Alert and oriented to person, place, and time. No cranial nerve deficit. Skin: Skin is warm and dry. Psychiatric: Normal mood and affect.     Medications:        sodium chloride flush  10 mL Intravenous 2 times per day    FLUoxetine  60 mg Oral Daily    metoprolol succinate  25 mg Oral Daily    rosuvastatin  10 mg Oral Nightly    warfarin (COUMADIN) daily dosing (placeholder)   Other RX Placeholder    pregabalin  100 mg Oral BID     sodium chloride flush, ondansetron, acetaminophen, oxyCODONE-acetaminophen, HYDROmorphone  DIET CARDIAC; Low Potassium     DVT Prophylaxis: Comadin    Lab and other Data:     Recent Labs     03/31/19 2020 04/01/19  0740 04/02/19  0538   WBC 13.8* 11.3* 9.6   HGB 8.2* 7.4* 8.3*    184 189     Recent Labs     03/31/19 2020 04/01/19  0740 04/02/19  0538    140 139   K 4.6 4.4 4.2    102 103   CO2 21* 29 28   BUN 27* 22 24*   CREATININE 0.9 0.8 0.8   GLUCOSE 135* 115* 102     Recent Labs     03/31/19  1325   AST 31   ALT 49*   BILITOT 0.6   ALKPHOS 66     Troponin T:   No results for input(s): TROPONINI in the last 72 hours. Pro-BNP: No results for input(s): BNP in the last 72 hours. INR:   Recent Labs     03/31/19  0144 04/01/19  0144 04/02/19  0538   INR 1.72* 1.48* 1.24*     ABGs: No results found for: PHART, PO2ART, QTY9RDY  UA:No results for input(s): NITRITE, COLORU, PHUR, LABCAST, WBCUA, RBCUA, MUCUS, TRICHOMONAS, YEAST, BACTERIA, CLARITYU, SPECGRAV, LEUKOCYTESUR, UROBILINOGEN, BILIRUBINUR, BLOODU, GLUCOSEU, AMORPHOUS in the last 72 hours.     Invalid input(s): Jj Schilling    RAD: Noted    Micro: N/A    Patient Active Problem List    Diagnosis Date Noted    Abnormal stress test 01/17/2019     Priority: High    Coronary artery disease 01/17/2019     Priority: High    Hx of CABG 01/17/2019     Priority: High    Hypertension 01/17/2019     Priority: High    H/O heart artery stent 01/17/2019     Priority: High    Chronic anticoagulation 01/17/2019     Priority: High    Avulsion of right hamstring muscle     Atrial fibrillation with RVR (HCC) 03/29/2019    Chronic bilateral low back pain without sciatica 03/20/2019    Palpitations     Chest pain 01/07/2019    Anxiety and depression 01/19/2018    Memory loss 12/11/2017    Anxiety 12/11/2017    Vertigo 02/18/2013    Sleep apnea 08/30/2012    Osteoarthritis of lumbar spine 06/15/2012     replace inactive diagnosis      B12 deficiency 06/15/2012       Assessment/plan: Active Problems:    Hypertension    Chronic anticoagulation    Atrial fibrillation with RVR ([facility])    Avulsion of right hamstring muscle  Resolved Problems:    * No resolved hospital problems. 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 Continue care, see above and orders. 2. Follow CBC and transfuse as needed  3. Coumadin anticoagulation, Lovenox bridge recommended by hematology  4. Orthopedic surgery input noted, signed off  5. Prognosis fair  6. Disposition continue inpatient care  7. Discharge disposition: to be determined.  Difficult to predict evolution due to hematoma and need for anticoagulation      Rishi Chandler MD  Hospitalist Service  4/2/2019  11:11 AM

## 2019-04-02 NOTE — PROGRESS NOTES
Patient name: Christa Gerber  Patient : 1945  7:00 AM  ROOM 721    CC: Follow-up GEE deficiency     Patient Active Problem List   Diagnosis Code    Osteoarthritis of lumbar spine M47.816    B12 deficiency E53.8    Sleep apnea G47.30    Vertigo R42    Memory loss R41.3    Anxiety F41.9    Anxiety and depression F41.9, F32.9    Chest pain R07.9    Palpitations R00.2    Abnormal stress test R94.39    Coronary artery disease I25.10    Hx of CABG Z95.1    Hypertension I10    H/O heart artery stent Z95.5    Chronic anticoagulation Z79.01    Chronic bilateral low back pain without sciatica M54.5, G89.29    Atrial fibrillation with RVR ([facility]) I48.91    Avulsion of right hamstring muscle S76.391A       Subjective: Denies active bleeding. Right thigh discomfort continues to improve, able to ambulate. HISTORY OF PRESENT ILLNESS:   Mr. Yong Travis is a pleasant 53-year-old  gentleman who is presently admitted with right lower extremity thigh pain with decreased mobility. Jeana Gallagher had pain/stimulator placed to the left back for chronic back and bilateral leg pain by Dr. Cesar Cristobal on 3/20/2019. He has a history of atrial fibrillation and GEE, a rare deficiency requiring chronic anticoagulation therapy with warfarin and has a Deepak filter in place. For the procedure he was placed on Lovenox and resumed warfarin with an INR of 2.6 on 3/28/2019. Jeana Gallagher is presently admitted for pain control of his right lower extremity.     Venous duplex of RLE on 3/28/2019- Chronic deep vein thrombosis involving the distal femoral and chronic superficial thrombophlebitis in short saphous vein     CT of right femur on 3/29/2019- Severe right hamstring tendinosis with ischiogluteal bursitis. Nonspecific elongated fluid collection in the central right thigh.  Intact right femur without fracture or dislocation of the hip or knee joints    Medical records Dr. Radha Brown Bi from 2014:  Dr. Radha Brown Bi's consultation note from 4/14/2014 documented that Reny Gonzalez had a strong family history of clotting disorders to include his sister developed a DVT at the age of 12, his sondeveloped DVT at the age of 12 and a daughter developed DVT and PE while on birth control with alow protein S. 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 Negative for chills, diaphoresis and fever. HENT: Negative. Negative for congestion, ear pain, hearing loss, nosebleeds, sore throat and tinnitus. Eyes: Negative. Negative for pain, discharge and redness. Respiratory: Positive for shortness of breath. Negative for cough and wheezing. Cardiovascular: Negative. Negative for chest pain, palpitations and leg swelling. Gastrointestinal: Negative. Negative for abdominal pain, blood in stool, constipation, diarrhea, nausea and vomiting. Endocrine: Negative for polydipsia. Genitourinary: Negative for dysuria, flank pain, frequency, hematuria and urgency. Musculoskeletal: Positive for back pain and joint swelling. Negative for myalgias and neck pain. Skin: Negative. Negative for rash. Bruising abdomen/ RLE   Neurological: Positive for weakness. Negative for dizziness, tremors, seizures and headaches. Hematological: Does not bruise/bleed easily. Psychiatric/Behavioral: Negative. The patient is not nervous/anxious.         Current Pain Assessment  Pain Assessment  Pain Assessment: 0-10  Pain Level: 6  Response to Pain Intervention: Patient Satisfied    OBJECTIVE:  VITALS: BP (!) 106/57   Pulse 56   Temp 97.6 °F (36.4 °C) (Temporal)   Resp 20   Ht 5' 8\" (1.727 m)   Wt 263 lb 12.8 oz (119.7 kg)   SpO2 95%   BMI 40.11 kg/m²   I&O:     Intake/Output Summary (Last 24 hours) at 4/2/2019 > 22 24*   CREATININE 0.9   < > 0.8 0.8   LABGLOM >60   < > >60 >60   GLUCOSE 270*   < > 115* 102   PROT 5.8*  --   --   --    LABALBU 3.3*  --   --   --    CALCIUM 8.7*   < > 8.3* 7.9*   BILITOT 0.6  --   --   --    ALKPHOS 66  --   --   --    AST 31  --   --   --    ALT 49*  --   --   --     < > = values in this interval not displayed. 30Day lookback of cultures:    Blood Culture Recent: No results for input(s): BC in the last 720 hours. Gram Stain Recent: No results for input(s): LABGRAM in the last 720 hours. Resp Culture Recent: No results for input(s): CULTRESP in the last 720 hours. Body Fluid Recent : No results for input(s): BFCX in the last 720 hours. MRSA Recent : No results for input(s): [address] in the last 720 hours. Urine Culture Recent : No results for input(s): LABURIN in the last 720 hours. Organism Recent : No results for input(s): ORG in the last 720 hours. Radiology:   Ct Abdomen Pelvis Wo Contrast Additional Contrast? None    Result Date: 3/29/2019  Examination. CT ABDOMEN PELVIS WO CONTRAST History: The patient complains of abdominal pain. History of retroperitoneal hematoma. DLP: 1742 mGycm. The CT scan of the abdomen and pelvis is performed without oral or intravenous contrast enhancement. The images are acquired in axial plane with subsequent reconstruction in coronal and sagittal planes. There is no previous study for comparison. The lung bases included in the study show scarring and areas of discoid atelectasis in the lower lungs bilaterally. A tiny, 2 mm, subpleural nodule is seen in the right lower lobe laterally, image #6 in axial plane. Severe atheromatous changes of the coronary arteries and the visualized thoracic aorta are noted. The unenhanced liver and spleen appear unremarkable. A markedly distended gallbladder seen no gallstones.  Common bile duct is normal. The pancreas is normal. The adrenal glands bilaterally are normal. There is marked lobulation of renal There is difficulty evaluating the distal tibial vessels due to the calcific plaque. I do believe there is three-vessel runoff to the right foot however. LEFT LOWER EXTREMITY Mild atherosclerosis of the common iliac artery which is nonstenotic. Nonstenotic external iliac artery and CFA with mild atherosclerosis. Mild to moderate atherosclerosis of the distal SFA without significant luminal narrowing. Portions of the left popliteal artery is obscured by streak artifact from the left knee arthroplasty. Otherwise no popliteal artery stenosis is suspected. Trifurcation disease. Mild multifocal stenoses of the anterior/posterior tibial arteries and probably artery. There is three-vessel runoff at the ankle. 1. Asymmetric right leg swelling with hematoma in the posterior compartment right thigh. I suspect this may all be related to an acute hamstrings avulsion injury. There is a 2 cm ossific fragment in the mid/distal thigh, possibly the avulsed and retracted fragment. Confirmation with MRI may be obtained. 2. Trifurcation and tibial disease with three-vessel runoff at both ankles. Signed by Dr Rohan Roth on 3/31/2019 10:10 AM    Ct Femur Right Wo Contrast    Addendum Date: 3/29/2019    Addendum: There is also superficial soft tissue swelling laterally and posteriorly at the level of the mid and distal femoral shaft, correlate for cellulitis. There is no superficial abscess identified. Signed by Dr Rohan Roth on 3/29/2019 8:48 AM    Result Date: 3/29/2019  ORIGINAL REPORT  History: 81-year-old with swelling and induration. Reference: None. TECHNIQUE: Unenhanced CT imaging of the right femur. Automated exposure control was utilized to limit radiation dose. DLP 1459 mGy-cm Findings: The right femur is intact. No fracture. No bone lesion or periosteal reaction. No cortical destruction. No right hip fracture or dislocation. Very mild osteoarthritis of the right hip. Right superior and inferior pubic rami are intact.  There is Provider Last Rate Last Dose    sodium chloride flush 0.9 % injection 10 mL  10 mL Intravenous 2 times per day Jennifer Eric MD   10 mL at 04/01/19 2007    sodium chloride flush 0.9 % injection 10 mL  10 mL Intravenous PRN Jennifer Eric MD        ondansetron (ZOFRAN) injection 4 mg  4 mg Intravenous Q6H PRN Jennifer Eric MD        acetaminophen (TYLENOL) tablet 650 mg  650 mg Oral Q8H PRN Carter Schwartz MD        FLUoxetine (PROZAC) capsule 60 mg  60 mg Oral Daily Carter Schwartz MD   60 mg at 04/01/19 0920    metoprolol succinate (TOPROL XL) extended release tablet 25 mg  25 mg Oral Daily Carter Schwartz MD   25 mg at 04/01/19 0920    oxyCODONE-acetaminophen (PERCOCET) 7.5-325 MG per tablet 1 tablet  1 tablet Oral Q6H PRN Jennifer Eric MD   1 tablet at 04/01/19 1931    rosuvastatin (CRESTOR) tablet 10 mg  10 mg Oral Nightly Carter Schwartz MD   10 mg at 04/01/19 2007    warfarin (COUMADIN) daily dosing (placeholder)   Other RX Placeholder Jennifer Eric MD        HYDROmorphone (DILAUDID) injection 0.5 mg  0.5 mg Intravenous Q3H PRN Nathaly Tabor MD   0.5 mg at 03/31/19 0423    pregabalin (LYRICA) capsule 100 mg  100 mg Oral BID Chao Terry PA-C   100 mg at 04/01/19 2006       Allergies  Iodine; Morphine; and Shellfish-derived products      ASSESSMENT/PLAN:  GEE Homozygous Gene Mutation, chronic anticoagulation with Warfarin (managed by Dr. Juan Christensen)   - INR 1.24/PT 15.0  - Warfarin 7.25 mg daily- on HOLD   - Chronic indwelling IVC Deepak filter in place. Anticipate resuming anticoagulation on 4/6/2019, this will be 7 days of no  anticoagulation. He will need to be bridged with Lovenox to Coumadin.   Lovenox dosing will be 1 mg/kg and will begin with Coumadin 5 mg daily.       Macrocytic anemia - likely related to acute blood loss from hematoma  - HgB 8.3 s/p 1 unit of pRBCs on 4/1/2019  - .4  - follow   - have requested further Medical records Dr. Brennan Zuleta Serology studies on 3/29/2019:  Ferritin 108.5  Iron 87  Iron saturation 29%  TIBC 296  Folate 14.7  Vitamin B12 795  Reticulocyte count 0.1022    Serology studies requested on 3/31/2019: Done due to drop in Hgb with increase in MCV  Repeat reticulocyte count - 0.1033 (4.57)  LDH - 237  Haptoglobin - 136  CMP stable, aside from   TSH - 0.54   Occult stool - needs to be collected    May need to consider bone marrow aspiration and biopsy in the future due to elevated MCV to R/O MDS.     RLE pain- IM and vascular managing  - Venous duplex of RLE on 3/28/2019- Chronic deep vein thrombosis involving the distal femoral and chronic superficial thrombophlebitis in short saphous vein  - CT of right femur on 3/29/2019- Severe right hamstring tendinosis with ischiogluteal bursitis. Nonspecific elongated fluid collection in the central right thigh. Intact right femur without fracture or dislocation of the hip or knee joints.          Disposition: Recommend additional 24 hrs of hospitalization, consider discharge tomorrow if others are in agreement. Anticipate resuming anticoagulation on 4/6/2019, this will be 7 days of no  anticoagulation. He will need to be bridged with Lovenox to Coumadin. Lovenox dosing will be 1 mg/kg and will begin with Coumadin 5 mg daily. Social service consult has been placed for assistance with insurance approval for Lovenox.     SANTY Smith    04/02/19  7:00 AM

## 2019-04-02 NOTE — PROGRESS NOTES
Physical Therapy    Facility/Department: [facility] PROGRESSIVE CARE  Initial Assessment    NAME: Edmund Soto  : 1945  MRN: [id number]    Date of Service: 2019    Assessment   Body structures, Functions, Activity limitations: Increased Pain  Assessment: pt demonstrated increased but tolerable pain in RLE with ambulation. pt functional mobility was good just requiring increased time due to pain. pt to be d/c from skilled PT services in this setting. Decision Making: Low Complexity  REQUIRES PT FOLLOW UP: No  Activity Tolerance  Activity Tolerance: Patient limited by pain       Patient Diagnosis(es): The primary encounter diagnosis was Atrial fibrillation with RVR ([address] A diagnosis of Acute pain of right lower extremity was also pertinent to this visit. has a past medical history of Anxiety, Atherosclerotic coronary vascular disease, CAD (coronary artery disease), Cervical radiculopathy, Chronic bilateral low back pain without sciatica, Depression, Diverticular disease, DVT (deep venous thrombosis) ([address] Erectile dysfunction, GERD (gastroesophageal reflux disease), Hyperlipidemia, Hypertension, Morbid obesity ([address] Obesity, OCD (obsessive compulsive disorder), Paroxysmal atrial fibrillation ([address] Unspecified sleep apnea, and Unstable angina pectoris ([address] has a past surgical history that includes Gastric bypass surgery (); Spine surgery ( and ); Spine surgery (); Tonsillectomy; Cataract removal (); Carpal tunnel release; hernia repair; Wrist surgery; Coronary angioplasty with stent (2004); eye surgery; Prostate surgery; knee surgery (Left, 1969); tumor removal (Right, 1973); back surgery; joint replacement (); Cardiac surgery ( and ); Coronary artery bypass graft (); Vena Cava Filter Placement; and IMPLANTATION VAGAL NERVE STIMULATOR (N/A, 3/20/2019).     Restrictions     Vision/Hearing        Subjective  General  Chart Reviewed: Yes  Patient assessed for rehabilitation services?: Yes  Family / Caregiver Present: No  Diagnosis: R hamstring avulsion  Follows Commands: Within Functional Limits  Subjective  Subjective: pt was agreeable and willing to participate  Pain Screening  Patient Currently in Pain: Yes  Pain Assessment  Pain Assessment: 0-10  Pain Level: 6  Pain Type: Acute pain  Pain Location: Knee  Pain Orientation: Right;Posterior  Vital Signs  Patient Currently in Pain: Yes       Orientation  Orientation  Overall Orientation Status: Within Functional Limits  Social/Functional History     Cognition        Objective     Observation/Palpation  Palpation: tender to palpate lateral thigh  Edema: mild swelling distal lateral hamstring    AROM RLE (degrees)  RLE AROM: WFL  AROM LLE (degrees)  LLE AROM : WFL  Strength RLE  Strength RLE: WFL(painful knee flexion)  Strength LLE  Strength LLE: WFL     Sensation  Overall Sensation Status: WFL  Bed mobility  Supine to Sit: Modified independent  Sit to Supine: Modified independent  Comment: pt required increased time  Transfers  Sit to Stand: Modified independent  Stand to sit: Modified independent  Comment: pt required increased time to stand at [employer]  Ambulation  Ambulation?: Yes  WB Status: FWBAT  Ambulation 1  Surface: level tile  Device: Rolling Walker  Assistance: Supervision  Quality of Gait: mild antalgic gait  Distance: 35'  Comments: pt able to ambulate with RW to allievate RLE pain. pt demonstrated no increased fatigue or instances of LOB.      Balance  Sitting - Static: Good  Standing - Static: Good        Plan   Plan  Plan Comment: Eval only; d/c from skilled PT services  Safety Devices  Type of devices: Left in bed, Call light within reach    Goals  Short term goals  Time Frame for Short term goals: Eval only; d/c from skilled PT services       Marlene Rsus       Electronically signed by Marlene Russ on 4/2/2019 at 8:37 AM

## 2019-04-02 NOTE — PROGRESS NOTES
Clinical Pharmacy Note    Warfarin consult follow-up    Recent Labs     04/02/19  0538   INR 1.24*     Recent Labs     03/31/19  2020 04/01/19  0740 04/02/19  0538   HGB 8.2* 7.4* 8.3*   HCT 25.4* 22.5* 25.4*    184 189     New warfarin drug-drug interactions: None    Date INR Warfarin Dose   03/29/19 2.76(from 03/28) Order discontinued by Emelina Rodriguez   03/30/19 2.38 7.5 mg (ordered by Emelina Rodriguez)     03/31/19  1.72 Hold    04/01/19  1.48  Hold    04/02/19  1.24  Hold                      Notes: Hold anticoagulation (i.e. Coumadin) until 4/6/19 per oncology note from today.     Electronically signed by Marsha Ryan PharmD, BCPS on 4/2/2019 at 10:53 AM

## 2019-04-02 NOTE — CARE COORDINATION
Completed dc assessment for pt who reports he resides at home with his spouse and intends to dc to the same location. Pt reports having CPAP equipment prior to admission and pt has the DME at bedside. Pt states DME is serviced by [facility]. Pt also reports having necessary DME including, a cane and walker used PRN and shower seat in the home. SW was requested to determine costs for Lovenox 100mg for 10 days and reported as $161.98. SW informed the pt who states he has Lovenox remaining at home and does not want to continue purchasing the med. Pt has requested to discuss options for using his remaining Loevnox upon dc. SW notified Dr Анна Selby of pt request.     Og Abdi will continue to follow and assist as necessary for further dc needs.

## 2019-04-03 VITALS
SYSTOLIC BLOOD PRESSURE: 106 MMHG | DIASTOLIC BLOOD PRESSURE: 62 MMHG | RESPIRATION RATE: 20 BRPM | HEART RATE: 68 BPM | OXYGEN SATURATION: 94 % | TEMPERATURE: 98.7 F | HEIGHT: 68 IN | WEIGHT: 265.6 LBS | BODY MASS INDEX: 40.25 KG/M2

## 2019-04-03 PROBLEM — D62 ACUTE BLOOD LOSS ANEMIA: Status: ACTIVE | Noted: 2019-03-30

## 2019-04-03 LAB
ANION GAP SERPL CALCULATED.3IONS-SCNC: 8 MMOL/L (ref 7–19)
APTT: 26.2 SEC (ref 26–36.2)
BUN BLDV-MCNC: 23 MG/DL (ref 8–23)
CALCIUM SERPL-MCNC: 8 MG/DL (ref 8.8–10.2)
CHLORIDE BLD-SCNC: 103 MMOL/L (ref 98–111)
CO2: 27 MMOL/L (ref 22–29)
CREAT SERPL-MCNC: 0.8 MG/DL (ref 0.5–1.2)
GFR NON-AFRICAN AMERICAN: >60
GLUCOSE BLD-MCNC: 115 MG/DL (ref 74–109)
HCT VFR BLD CALC: 24.9 % (ref 42–52)
HEMOGLOBIN: 8 G/DL (ref 14–18)
INR BLD: 1.2 (ref 0.88–1.18)
MCH RBC QN AUTO: 34 PG (ref 27–31)
MCHC RBC AUTO-ENTMCNC: 32.1 G/DL (ref 33–37)
MCV RBC AUTO: 106 FL (ref 80–94)
OCCULT BLOOD DIAGNOSTIC: NORMAL
OCCULT BLOOD QC: NORMAL
PDW BLD-RTO: 14.8 % (ref 11.5–14.5)
PLATELET # BLD: 181 K/UL (ref 130–400)
PMV BLD AUTO: 9.1 FL (ref 9.4–12.4)
POTASSIUM SERPL-SCNC: 4.2 MMOL/L (ref 3.5–5)
PROTHROMBIN TIME: 14.6 SEC (ref 12–14.6)
RBC # BLD: 2.35 M/UL (ref 4.7–6.1)
SODIUM BLD-SCNC: 138 MMOL/L (ref 136–145)
WBC # BLD: 9.4 K/UL (ref 4.8–10.8)

## 2019-04-03 PROCEDURE — 82272 OCCULT BLD FECES 1-3 TESTS: CPT

## 2019-04-03 PROCEDURE — 99239 HOSP IP/OBS DSCHRG MGMT >30: CPT | Performed by: FAMILY MEDICINE

## 2019-04-03 PROCEDURE — 85610 PROTHROMBIN TIME: CPT

## 2019-04-03 PROCEDURE — 80048 BASIC METABOLIC PNL TOTAL CA: CPT

## 2019-04-03 PROCEDURE — 2580000003 HC RX 258: Performed by: INTERNAL MEDICINE

## 2019-04-03 PROCEDURE — 85027 COMPLETE CBC AUTOMATED: CPT

## 2019-04-03 PROCEDURE — 36415 COLL VENOUS BLD VENIPUNCTURE: CPT

## 2019-04-03 PROCEDURE — 6370000000 HC RX 637 (ALT 250 FOR IP): Performed by: PHYSICIAN ASSISTANT

## 2019-04-03 PROCEDURE — 85730 THROMBOPLASTIN TIME PARTIAL: CPT

## 2019-04-03 PROCEDURE — 6370000000 HC RX 637 (ALT 250 FOR IP): Performed by: INTERNAL MEDICINE

## 2019-04-03 RX ORDER — WARFARIN SODIUM 7.5 MG/1
7.5 TABLET ORAL DAILY
Qty: 30 TABLET | Refills: 0 | Status: ON HOLD | OUTPATIENT
Start: 2019-04-08 | End: 2019-04-19 | Stop reason: SDUPTHER

## 2019-04-03 RX ORDER — PREGABALIN 100 MG/1
100 CAPSULE ORAL 2 TIMES DAILY
Qty: 60 CAPSULE | Refills: 0 | Status: SHIPPED | OUTPATIENT
Start: 2019-04-03 | End: 2019-05-03

## 2019-04-03 RX ORDER — WARFARIN SODIUM 10 MG/1
10 TABLET ORAL DAILY
Qty: 30 TABLET | Refills: 3 | Status: ON HOLD | OUTPATIENT
Start: 2019-04-06 | End: 2019-04-19 | Stop reason: HOSPADM

## 2019-04-03 RX ORDER — CLOPIDOGREL BISULFATE 75 MG/1
75 TABLET ORAL DAILY
Qty: 90 TABLET | Refills: 3 | Status: ON HOLD | OUTPATIENT
Start: 2019-04-15 | End: 2019-04-19 | Stop reason: HOSPADM

## 2019-04-03 RX ADMIN — FLUOXETINE 60 MG: 20 CAPSULE ORAL at 08:30

## 2019-04-03 RX ADMIN — METOPROLOL SUCCINATE 25 MG: 25 TABLET, EXTENDED RELEASE ORAL at 08:30

## 2019-04-03 RX ADMIN — PREGABALIN 100 MG: 50 CAPSULE ORAL at 08:30

## 2019-04-03 RX ADMIN — Medication 10 ML: at 08:29

## 2019-04-03 NOTE — PROGRESS NOTES
Clinical Pharmacy Note    Warfarin consult follow-up    Recent Labs     04/03/19  0611   INR 1.20*     Recent Labs     04/01/19  0740 04/02/19  0538 04/03/19  0119   HGB 7.4* 8.3* 8.0*   HCT 22.5* 25.4* 24.9*    189 181       Significant Drug-Drug Interactions:  New warfarin drug-drug interactions: none  Discontinued drug-drug interactions: none      Date INR Warfarin Dose   03/29/19 2.76(from 03/28) Order discontinued by Funmi Augustin   03/30/19 2.38 7.5 mg (ordered by Funmi Augustin)     03/31/19  1.72 Hold    04/01/19  1.48  Hold    04/02/19  1.24  Hold                        Notes:                   Continue to hold per Dr. Naveen Martinez with plan to resume on 4/6/19.       Electronically signed by Carlotta Bautista [address] [place] [address] on 4/3/2019 at 9:08 AM

## 2019-04-03 NOTE — PROGRESS NOTES
PROGRESS NOTE  Patient name: Retta Sandifer  Patient : 1945      SUBJECTIVE: Feeling well. No new complaints. INTERVAL HISTORY  HISTORY OF PRESENT ILLNESS:   Mr. Ridge Alfonso is a pleasant 65-year-old  gentleman who is presently admitted with right lower extremity thigh pain with decreased mobility.  Alfred had pain/stimulator placed to the left back for chronic back and bilateral leg pain by Dr. Enoch Keys on 3/20/2019. He has a history of atrial fibrillation and GEE, a rare deficiency requiring chronic anticoagulation therapy with warfarin and has a Deepak filter in place.  For the procedure he was placed on Lovenox and resumed warfarin with an INR of 2.6 on 3/28/2019. Alfred is presently admitted for pain control of his right lower extremity.     Venous duplex of RLE on 3/28/2019- Chronic deep vein thrombosis involving the distal femoral and chronic superficial thrombophlebitis in short saphous vein     CT of right femur on 3/29/2019- Severe right hamstring tendinosis with ischiogluteal bursitis. Nonspecific elongated fluid collection in the central right thigh.  Intact right femur without fracture or dislocation of the hip or knee joints     Medical records Dr. Mahad Washburn from 2014:  Dr. Marlo Jim's consultation note from 2014 documented that Claudio Jauregui had a strong family history of clotting disorders to include his sister developed a DVT at the age of 12, his sondeveloped DVT at the age of 12 and a daughter developed DVT and PE while on birth control with alow protein S.     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 He appears well-developed and well-nourished. No distress. HENT:   Head: Normocephalic and atraumatic. Mouth/Throat: Oropharynx is clear and moist.   Eyes: Conjunctivae are normal. Right eye exhibits no discharge. Left eye exhibits no discharge. No scleral icterus. Neck: Neck supple. No JVD present. No tracheal deviation present. Cardiovascular: Normal rate, regular rhythm, normal heart sounds and intact distal pulses. Exam reveals no gallop and no friction rub. No murmur heard. Pulmonary/Chest: Effort normal and breath sounds normal. No respiratory distress. He has no wheezes. He has no rales. He exhibits no tenderness. Abdominal: Soft. Bowel sounds are normal. He exhibits no distension and no mass. There is no tenderness. There is no rebound and no guarding. No hernia. Musculoskeletal: He exhibits edema and tenderness. He exhibits no deformity. RLE   Neurological: He is alert and oriented to person, place, and time. No cranial nerve deficit. Coordination normal.   Skin: Skin is warm. No rash noted. He is not diaphoretic. No erythema. No pallor. Dark purple ecchymosis to back of right thigh and abdomen    Psychiatric: He has a normal mood and affect. His behavior is normal. Thought content normal.   Vitals reviewed.         Recent Labs     04/03/19  0119 04/02/19  0538 04/01/19  0740   WBC 9.4 9.6 11.3*   HGB 8.0* 8.3* 7.4*   HCT 24.9* 25.4* 22.5*   .0* 105.4* 106.6*    189 184       Lab Results   Component Value Date     04/03/2019    K 4.2 04/03/2019     04/03/2019    CO2 27 04/03/2019    BUN 23 04/03/2019    CREATININE 0.8 04/03/2019    GLUCOSE 115 (H) 04/03/2019    CALCIUM 8.0 (L) 04/03/2019    PROT 5.8 (L) 03/31/2019    LABALBU 3.3 (L) 03/31/2019    BILITOT 0.6 03/31/2019    ALKPHOS 66 03/31/2019    AST 31 03/31/2019    ALT 49 (H) 03/31/2019    LABGLOM >60 04/03/2019       Lab Results   Component Value Date    INR 1.20 (H) 04/03/2019    INR 1.24 (H) 04/02/2019    INR 1.48 (H) 04/01/2019    PROTIME 14.6 04/03/2019    PROTIME 15.0 (H) 04/02/2019    PROTIME 17.2 (H) 04/01/2019       ASSESSMENT/PLAN:  GEE Homozygous Gene Mutation, chronic anticoagulation with Warfarin (managed by Dr. Jaylene Cherry)   - INR 1.24/PT 15.0  - Warfarin 7.25 mg daily- on HOLD   - Chronic indwelling IVC Deepak filter in place.        Anticipate resuming anticoagulation on 4/6/2019, this will be 7 days of no  anticoagulation. He will need to be bridged with Lovenox to Coumadin. Lovenox dosing will be 1 mg/kg and will begin with Coumadin 5 mg daily.        Macrocytic anemia - likely related to acute blood loss from hematoma  - HgB 8.0 s/p 1 unit of pRBCs on 4/1/2019  - .4  - follow   - have requested further Medical records Dr. Annia Akers  Serology studies on 3/29/2019:  Ferritin 108.5  Iron 87  Iron saturation 29%  TIBC 296  Folate 14.7  Vitamin B12 795  Reticulocyte count 0.1022     Serology studies requested on 3/31/2019: Done due to drop in Hgb with increase in MCV  Repeat reticulocyte count - 0.1033 (4.57)  LDH - 237  Haptoglobin - 136  CMP stable, aside from   TSH - 0.54   Occult stool - needs to be collected     May need to consider bone marrow aspiration and biopsy in the future due to elevated MCV to R/O MDS.     RLE pain- IM and vascular managing  - Venous duplex of RLE on 3/28/2019- Chronic deep vein thrombosis involving the distal femoral and chronic superficial thrombophlebitis in short saphous vein  - CT of right femur on 3/29/2019- Severe right hamstring tendinosis with ischiogluteal bursitis. Nonspecific elongated fluid collection in the central right thigh. Intact right femur without fracture or dislocation of the hip or knee joints.            Disposition: he can be discharged from my standpoint today.  Anticipate resuming anticoagulation on 4/6/2019. He will need to be bridged with Lovenox to Coumadin. Lovenox dosing will be 1 mg/kg and will begin with Coumadin 10 mg daily x 2 days followed by 7.5 mg daily. Social service consult has been placed for assistance with insurance approval for Lovenox. CBC and INR to be checked Mondays and Thursday.        [address] [facility]    04/03/19  7:38 AM

## 2019-04-03 NOTE — PROGRESS NOTES
Trying to obtain occult stool sample on pt in room 721. Hat placed in toilet and patient instructed to use hat in bathroom when a BM occurs. Patient verbalized understanding on this. After patient went to the bathroom he stated \"I think I missed the hat. \" Upon examination nothing was in the hat. Re-educated patient and wife on the importance of going in the hat to obtain stool sample for testing. Verbal understanding re-obtained.  Electronically signed by Roanna Bernheim, RN on 4/3/2019 at 9:46 AM

## 2019-04-03 NOTE — PROGRESS NOTES
Patient and spouse state that they will use [facility] for lovenox only, and will use [facility] in [place] for everything else. Spoke with Krystin Sharif at [facility] DR NATE REN in [place]. Price on Lyrica prescription $98.42 for 30 day supply. Patient ok with price.   Electronically signed by Ray Breaux RN on 4/3/2019 at 11:24 AM

## 2019-04-03 NOTE — DISCHARGE SUMMARY
Hospitalist   Discharge Summary    Mp Rushing  :  1945  MRN:  [id number]    Admit date:  3/28/2019  Discharge date:  4/3/2019    Admitting Physician:  Osman Holloway MD    Advance Directive: Full Code    Consults: hematology/oncology, orthopedic surgery and vascular surgery    Primary Care Physician:  Mustapha Carrion MD    Discharge Diagnoses: Active Problems:    Hypertension    Chronic anticoagulation    Atrial fibrillation with RVR (HCC)    Avulsion of right hamstring muscle    Acute blood loss anemia    Morbid obesity ([address]  Resolved Problems:    * No resolved hospital problems. *      Significant Diagnostic Studies:   Ct Abdomen Pelvis Wo Contrast Additional Contrast? None    Result Date: 3/29/2019  Examination. CT ABDOMEN PELVIS WO CONTRAST History: The patient complains of abdominal pain. History of retroperitoneal hematoma. DLP: 1742 mGycm. The CT scan of the abdomen and pelvis is performed without oral or intravenous contrast enhancement. The images are acquired in axial plane with subsequent reconstruction in coronal and sagittal planes. There is no previous study for comparison. The lung bases included in the study show scarring and areas of discoid atelectasis in the lower lungs bilaterally. A tiny, 2 mm, subpleural nodule is seen in the right lower lobe laterally, image #6 in axial plane. Severe atheromatous changes of the coronary arteries and the visualized thoracic aorta are noted. The unenhanced liver and spleen appear unremarkable. A markedly distended gallbladder seen no gallstones. Common bile duct is normal. The pancreas is normal. The adrenal glands bilaterally are normal. There is marked lobulation of renal contour bilaterally suggesting chronic renal cortical scarring. There are bilateral peripelvic renal cysts, more pronounced on the right side. No hydronephrosis. The ureters bilaterally are normal. The urinary bladder is moderately well distended. No intrinsic abnormality. There is a previous prostatectomy surgery. The small fat-containing inguinal hernias bilaterally are seen, left larger than the right. Postsurgical changes of the stomach are noted. The duodenum is normal. The small bowel is normal. A normal appendix is seen. Moderate gas and stool are seen in the colon. There is diverticulosis of the distal colon. No evidence for diverticulitis. The atheromatous changes of the abdominal aorta and iliac arteries are seen. No aneurysmal dilatation. An inferior vena cava filter seen in place with distal end opposite mid body of L2 There is no evidence of abdominal or pelvic lymphadenopathy. Chronic degenerative changes of the lumbar spine are seen with evidence of hardware fusion of L5-S1. A dorsal column stimulator electrodes are seen in place. There is a mild dextroscoliosis. No focal bony abnormality or a bone lesion. There is evidence of upper abdominal midline fat-containing ventral hernia. There is no evidence of retroperitoneal hematoma. No acute abnormality of the abdomen or pelvis. No evidence of retroperitoneal hematoma. A very distended gallbladder without stones. This may be further evaluated with radionuclide hepatobiliary imaging. The appendix is normal. The diverticulosis of the distal colon. No evidence for diverticulitis. Postsurgical changes of the stomach. No focal complication. The above finding are recorded on a digital voice clip in PACS. Signed by Dr Judy Starkey on 3/29/2019 8:14 AM    Ct Femur Right Wo Contrast    Addendum Date: 3/29/2019    Addendum: There is also superficial soft tissue swelling laterally and posteriorly at the level of the mid and distal femoral shaft, correlate for cellulitis. There is no superficial abscess identified. Signed by Dr Carol Shepard on 3/29/2019 8:48 AM    Result Date: 3/29/2019  1. Intact right femur without fracture or dislocation of the hip or knee joints.  2. Nonspecific elongated fluid collection in the central right thigh. Consider muscular tear as cause but this is nonspecific. MRI is recommended. 3. Severe right hamstring tendinosis with ischiogluteal bursitis. Vl Extremity Venous Right    Result Date: 3/29/2019  Vascular Lower Extremities DVT Study Procedure  Demographics   Patient Name    Kirill Hall Age                   72   Patient Number  [id number]           Gender                Male   Visit Number    [id number]        Interpreting          Ihsan Barraza MD                                   Physician   Date of Birth   1945       Referring Physician   [facility]   Accession       [id number]        [address] [place] [address]  Number  Procedure Type of Study:   Veins:Lower Extremities DVT Study, VL EXTREMITY VENOUS DUPLEX RIGHT. Indications for Study:Pain, right lower extremity and Edema, right lower extremity. Impression   There is evidence of chronic deep vein thrombosis in the right lower  extremity involving the distal femoral vein. Chronic superficial thrombophlebitis is seen in the short saphenous vein  of the right lower extremity(ies). Signature   ----------------------------------------------------------------  Electronically signed by Ihsan Barraza MD(Interpreting  physician) on 03/29/2019 07:45 AM  ----------------------------------------------------------------  Velocities are measured in cm/s ; Diameters are measured in mm Right Lower Extremities DVT Study Measurements Right 2D Measurements +------------------------------------+----------+---------------+----------+ ! Location                            ! Visualized! Compressibility! Thrombosis! +------------------------------------+----------+---------------+----------+ ! Sapheno Femoral Junction            ! Yes       ! Yes            ! None      ! +------------------------------------+----------+---------------+----------+ ! Common Femoral                      !Yes       ! Yes            ! None      ! +------------------------------------+----------+---------------+----------+ ! Prox Femoral                        !Yes       ! Yes            ! None      ! +------------------------------------+----------+---------------+----------+ ! Mid Femoral                         !Yes       ! Yes            ! None      ! +------------------------------------+----------+---------------+----------+ ! Dist Femoral                        !Yes       ! No             !Chronic   ! +------------------------------------+----------+---------------+----------+ ! Deep Femoral                        !Yes       ! Yes            ! None      ! +------------------------------------+----------+---------------+----------+ ! Popliteal                           !Yes       ! Yes            ! None      ! +------------------------------------+----------+---------------+----------+ ! SSV                                 ! Yes       ! No             !Chronic   ! +------------------------------------+----------+---------------+----------+ ! Gastroc                             ! Yes       ! Yes            ! None      ! +------------------------------------+----------+---------------+----------+ ! PTV                                 ! Yes       ! Yes            ! None      ! +------------------------------------+----------+---------------+----------+ ! GSV                                 ! Yes       ! Yes            ! None      ! +------------------------------------+----------+---------------+----------+ ! ATV                                 ! Yes       ! Yes            ! None      ! +------------------------------------+----------+---------------+----------+ ! Peroneal                            !Yes       ! Yes            ! None      ! +------------------------------------+----------+---------------+----------+      Labs:    CBC:   Recent Labs     04/01/19  0740 04/02/19  0538 04/03/19  0119   WBC 11.3* 9.6 9.4   HGB 7.4* 8.3* 8.0*    189 181     BMP:    Recent Labs 04/01/19  0740 04/02/19  0538 04/03/19  0119    139 138   K 4.4 4.2 4.2    103 103   CO2 29 28 27   BUN 22 24* 23   CREATININE 0.8 0.8 0.8   GLUCOSE 115* 102 115*     Hepatic:   Recent Labs     03/31/19  1325   AST 31   ALT 49*   BILITOT 0.6   ALKPHOS 66     Troponin: No results for input(s): TROPONINI in the last 72 hours. BNP: No results for input(s): BNP in the last 72 hours. Lipids: No results for input(s): CHOL, HDL in the last 72 hours. Invalid input(s): LDLCALCU  INR:   Recent Labs     04/01/19  0144 04/02/19  0538 04/03/19  0611   INR 1.48* 1.24* 1.20[facility] Course: The patient is a 72 y.o. male who presents to er with 48 hours history of RLE pain, thigh, RLE, pain and decreased mobility, pain is severe constant, he had chronic dvt of rle with a non retrievable IVC filter, has been on warfarin, and recently on lovenox perioperatively he recently had placement of a spine stimulator. Also found to have a-fib with rvr, he denies cp, or palpitation, started on cardizem gtt. He was noted to have a large hematoma and pain over right thigh due to Avulsion of right hamstring muscle and chronic anticoagulation. The hematoma was large taking the thigh to leg and causing significant pain. This is a complicated case due to DVT history and hypercoagulable syndrome complicated by this skin bleeding. Vascular surgery and hematology were consulted for recommendations in managing bleed and balancing anticoagulation or using alternatives. It was decided to hold anticoagulants initially. And restart coumadin with a lovenox bridge once pain improved and hemoglobin stabilized. The patient was able to increase activity and walk without significant pain. We recommended placement at SNF, but he refused several times. Being that he remained stable, he was discharged home with close outpatient follow up.     Physical Exam:    Vitals: /62   Pulse 68   Temp 98.7 °F (37.1 °C) (Temporal) Resp 20   Ht 5' 8\" (1.727 m)   Wt 265 lb 9.6 oz (120.5 kg)   SpO2 94%   BMI 40.38 kg/m²   General appearance: alert, appears stated age and cooperative  Skin: Skin color, texture, turgor normal. No rashes or lesions   HEENT: Head: Normocephalic, no lesions, without obvious abnormality. Neck: no adenopathy, no carotid bruit, no JVD, supple, symmetrical, trachea midline and thyroid not enlarged, symmetric, no tenderness/mass/nodules  Lungs: clear to auscultation bilaterally  Heart: regular rate and rhythm, S1, S2 normal, no murmur, click, rub or gallop  Abdomen: soft, non-tender; bowel sounds normal; no masses,  no organomegaly  Extremities: improving hematoma right thigh with resolving edema  Neurologic: Mental status: Alert, oriented, thought content appropriate    Discharge Medications:       Petey Campbell   Home Medication Instructions ADX:[id number]    Printed on:04/03/19 4319   Medication Information                      Calcium Carbonate-Vitamin D (CALCIUM 600+D PO)  Take 1 tablet by mouth every morning              cetirizine (ZYRTEC) 10 MG tablet  Take 10 mg by mouth daily             clopidogrel (PLAVIX) 75 MG tablet  Take 1 tablet by mouth daily             enoxaparin (LOVENOX) 120 MG/0.8ML injection  Inject 0.8 mLs into the skin every 12 hours for 6 days             famotidine (PEPCID) 20 MG tablet  Take 1 tablet by mouth 2 times daily. FLUoxetine (PROZAC) 20 MG capsule  Take 3 capsules by mouth daily. metoprolol succinate (TOPROL XL) 25 MG extended release tablet  Take 25 mg by mouth daily             Multiple Vitamins-Minerals (MULTIVITAMIN ADULT PO)  Take 1 tablet by mouth daily              nitroGLYCERIN (NITROSTAT) 0.4 MG SL tablet  Place 1 tablet under the tongue every 5 minutes as needed for Chest pain             oxyCODONE-acetaminophen (PERCOCET) 7.5-325 MG per tablet  Take 1 tablet by mouth every 6 hours as needed for Pain for up to 14 days. pregabalin (LYRICA) 100 MG capsule  Take 1 capsule by mouth 2 times daily for 30 days. rosuvastatin (CRESTOR) 10 MG tablet  Take 1 tablet by mouth daily. vitamin B-12 (CYANOCOBALAMIN) 1000 MCG tablet  Take 1,000 mcg by mouth daily. warfarin (COUMADIN) 10 MG tablet  Take 1 tablet by mouth daily for 2 days             warfarin (COUMADIN) 7.5 MG tablet  Take 1 tablet by mouth daily                 Discharge Instructions: Follow up with Jayme Greer MD in 7 days. Take medications as directed. Resume activity as tolerated. Diet: DIET CARDIAC; Low Potassium     Disposition: Patient is medically stable and will be discharged home. Time spent on discharge 38 minutes.     Signed:  Lois George MD  Hospitalist service  4/3/2019  10:39 AM

## 2019-04-04 ENCOUNTER — CARE COORDINATION (OUTPATIENT)
Dept: CASE MANAGEMENT | Age: 74
End: 2019-04-04

## 2019-04-04 PROBLEM — E66.01 MORBID OBESITY (HCC): Status: ACTIVE | Noted: 2019-04-04

## 2019-04-04 NOTE — CARE COORDINATION
[facility] Transitions Initial Follow Up Call    Call within 2 business days of discharge: Yes    Patient: Peewee Santiago Patient : 1945   MRN: [id number]  Reason for Admission: Afib RVR  Discharge Date: 4/3/19 RARS: Readmission Risk Score: 15       Spoke with: [address] Anthonynilsamike Gonzales      Non-face-to-face services provided:  Reviewed encounter information for continuity of care prior to follow up phone call - chart notes, consults, progress notes, test results, med list, appointments, AVS, other information. Care Transitions 24 Hour Call    Do you have any ongoing symptoms?:  No  Do you have a copy of your discharge instructions?:  Yes  Do you have all of your prescriptions and are they filled?:  Yes  Have you been contacted by a [address]?:  No  Have you scheduled your follow up appointment?:  Yes  How are you going to get to your appointment?:  Car - family or friend to transport  Were you discharged with any Home Care or Post Acute Services:  No  Patient DME:  Straight cane, Walker  Do you have support at home?:  Partner/Spouse/SO  Do you feel like you have everything you need to keep you well at home?:  Yes  Are you an active caregiver in your home?:  No  Care Transitions Interventions         Follow Up : Spoke with patient for initial follow up call after discharge. He states he is doing ok, just weak. He has obtained his discharge medications, and is currently taking them. He states he cannot reconcile his medications as his wife takes care of that, and she was not home. He states he has his follow up appointments, but his wife handles that, and he doesn't know exactly when. He did say he was going to have labs done this week at his doctor's office. From records it appears that he does follow up with Dr. Evelyn Dorman in May. He is currently on Lovenox, and has been on it previously and knows how to administer the injections.  No other complaints or issues. Will follow up with patient at a later time.   Future Appointments   Date Time Provider Jon Pompa   5/16/2019  1:15 PM Ishan Acosta MD [facility]   8/5/2019 10:30 AM SANTY Rossi [facility]       Joey Muse RN

## 2019-04-08 ENCOUNTER — ANTI-COAG VISIT (OUTPATIENT)
Dept: CARDIOLOGY | Age: 74
End: 2019-04-08
Payer: MEDICARE

## 2019-04-08 LAB — INR BLD: 1

## 2019-04-08 PROCEDURE — 93793 ANTICOAG MGMT PT WARFARIN: CPT | Performed by: NURSE PRACTITIONER

## 2019-04-09 LAB — INR BLD: 1.2

## 2019-04-10 ENCOUNTER — ANTI-COAG VISIT (OUTPATIENT)
Dept: CARDIOLOGY | Age: 74
End: 2019-04-10

## 2019-04-12 ENCOUNTER — ANTI-COAG VISIT (OUTPATIENT)
Dept: CARDIOLOGY | Age: 74
End: 2019-04-12

## 2019-04-12 LAB — INR BLD: 1.7

## 2019-04-14 ENCOUNTER — HOSPITAL ENCOUNTER (INPATIENT)
Age: 74
LOS: 5 days | Discharge: HOME | DRG: 813 | End: 2019-04-19
Attending: EMERGENCY MEDICINE | Admitting: FAMILY MEDICINE
Payer: MEDICARE

## 2019-04-14 ENCOUNTER — APPOINTMENT (OUTPATIENT)
Dept: CT IMAGING | Age: 74
DRG: 813 | End: 2019-04-14
Payer: MEDICARE

## 2019-04-14 DIAGNOSIS — M79.89 LEG SWELLING: ICD-10-CM

## 2019-04-14 DIAGNOSIS — S70.11XA HEMATOMA OF RIGHT THIGH, INITIAL ENCOUNTER: ICD-10-CM

## 2019-04-14 DIAGNOSIS — D64.9 ANEMIA, UNSPECIFIED TYPE: Primary | ICD-10-CM

## 2019-04-14 PROBLEM — S80.10XA: Status: ACTIVE | Noted: 2019-04-14

## 2019-04-14 PROBLEM — R07.9 CHEST PAIN: Status: RESOLVED | Noted: 2019-01-07 | Resolved: 2019-04-14

## 2019-04-14 PROBLEM — F41.9 ANXIETY: Status: RESOLVED | Noted: 2017-12-11 | Resolved: 2019-04-14

## 2019-04-14 PROBLEM — D50.0 CHRONIC BLOOD LOSS ANEMIA: Chronic | Status: ACTIVE | Noted: 2019-03-30

## 2019-04-14 LAB
ABO/RH: NORMAL
ANION GAP SERPL CALCULATED.3IONS-SCNC: 8 MMOL/L (ref 7–19)
ANTIBODY SCREEN: NORMAL
APTT: 99.4 SEC (ref 26–36.2)
BLOOD BANK DISPENSE STATUS: NORMAL
BLOOD BANK PRODUCT CODE: NORMAL
BPU ID: NORMAL
BUN BLDV-MCNC: 16 MG/DL (ref 8–23)
CALCIUM SERPL-MCNC: 8 MG/DL (ref 8.8–10.2)
CHLORIDE BLD-SCNC: 104 MMOL/L (ref 98–111)
CO2: 24 MMOL/L (ref 22–29)
CREAT SERPL-MCNC: 0.9 MG/DL (ref 0.5–1.2)
DESCRIPTION BLOOD BANK: NORMAL
GFR NON-AFRICAN AMERICAN: >60
GLUCOSE BLD-MCNC: 125 MG/DL (ref 74–109)
HCT VFR BLD CALC: 24 % (ref 42–52)
HCT VFR BLD CALC: 24.8 % (ref 42–52)
HEMOGLOBIN: 7.5 G/DL (ref 14–18)
HEMOGLOBIN: 7.6 G/DL (ref 14–18)
INR BLD: 2.88 (ref 0.88–1.18)
MCH RBC QN AUTO: 35.7 PG (ref 27–31)
MCH RBC QN AUTO: 35.9 PG (ref 27–31)
MCHC RBC AUTO-ENTMCNC: 30.2 G/DL (ref 33–37)
MCHC RBC AUTO-ENTMCNC: 31.7 G/DL (ref 33–37)
MCV RBC AUTO: 112.7 FL (ref 80–94)
MCV RBC AUTO: 118.7 FL (ref 80–94)
PDW BLD-RTO: 17.6 % (ref 11.5–14.5)
PDW BLD-RTO: 17.7 % (ref 11.5–14.5)
PLATELET # BLD: 155 K/UL (ref 130–400)
PLATELET # BLD: 163 K/UL (ref 130–400)
PMV BLD AUTO: 9.1 FL (ref 9.4–12.4)
PMV BLD AUTO: 9.4 FL (ref 9.4–12.4)
POTASSIUM SERPL-SCNC: 4.2 MMOL/L (ref 3.5–5)
PROTHROMBIN TIME: 29.4 SEC (ref 12–14.6)
RBC # BLD: 2.09 M/UL (ref 4.7–6.1)
RBC # BLD: 2.13 M/UL (ref 4.7–6.1)
SODIUM BLD-SCNC: 136 MMOL/L (ref 136–145)
WBC # BLD: 7.1 K/UL (ref 4.8–10.8)
WBC # BLD: 7.1 K/UL (ref 4.8–10.8)

## 2019-04-14 PROCEDURE — 2580000003 HC RX 258: Performed by: EMERGENCY MEDICINE

## 2019-04-14 PROCEDURE — 36430 TRANSFUSION BLD/BLD COMPNT: CPT

## 2019-04-14 PROCEDURE — 2700000000 HC OXYGEN THERAPY PER DAY

## 2019-04-14 PROCEDURE — 1210000000 HC MED SURG R&B

## 2019-04-14 PROCEDURE — 86927 PLASMA FRESH FROZEN: CPT

## 2019-04-14 PROCEDURE — 86901 BLOOD TYPING SEROLOGIC RH(D): CPT

## 2019-04-14 PROCEDURE — 6360000004 HC RX CONTRAST MEDICATION: Performed by: EMERGENCY MEDICINE

## 2019-04-14 PROCEDURE — 85610 PROTHROMBIN TIME: CPT

## 2019-04-14 PROCEDURE — 6370000000 HC RX 637 (ALT 250 FOR IP): Performed by: EMERGENCY MEDICINE

## 2019-04-14 PROCEDURE — 85730 THROMBOPLASTIN TIME PARTIAL: CPT

## 2019-04-14 PROCEDURE — 6370000000 HC RX 637 (ALT 250 FOR IP): Performed by: HOSPITALIST

## 2019-04-14 PROCEDURE — 2580000003 HC RX 258: Performed by: HOSPITALIST

## 2019-04-14 PROCEDURE — 86850 RBC ANTIBODY SCREEN: CPT

## 2019-04-14 PROCEDURE — 6360000002 HC RX W HCPCS: Performed by: EMERGENCY MEDICINE

## 2019-04-14 PROCEDURE — 86900 BLOOD TYPING SEROLOGIC ABO: CPT

## 2019-04-14 PROCEDURE — 80048 BASIC METABOLIC PNL TOTAL CA: CPT

## 2019-04-14 PROCEDURE — 99285 EMERGENCY DEPT VISIT HI MDM: CPT

## 2019-04-14 PROCEDURE — 99223 1ST HOSP IP/OBS HIGH 75: CPT | Performed by: HOSPITALIST

## 2019-04-14 PROCEDURE — 99285 EMERGENCY DEPT VISIT HI MDM: CPT | Performed by: EMERGENCY MEDICINE

## 2019-04-14 PROCEDURE — 85027 COMPLETE CBC AUTOMATED: CPT

## 2019-04-14 PROCEDURE — 96374 THER/PROPH/DIAG INJ IV PUSH: CPT

## 2019-04-14 PROCEDURE — 6370000000 HC RX 637 (ALT 250 FOR IP): Performed by: INTERNAL MEDICINE

## 2019-04-14 PROCEDURE — 96375 TX/PRO/DX INJ NEW DRUG ADDON: CPT

## 2019-04-14 PROCEDURE — P9017 PLASMA 1 DONOR FRZ W/IN 8 HR: HCPCS

## 2019-04-14 PROCEDURE — 73706 CT ANGIO LWR EXTR W/O&W/DYE: CPT

## 2019-04-14 PROCEDURE — 36415 COLL VENOUS BLD VENIPUNCTURE: CPT

## 2019-04-14 RX ORDER — ONDANSETRON 2 MG/ML
4 INJECTION INTRAMUSCULAR; INTRAVENOUS ONCE
Status: COMPLETED | OUTPATIENT
Start: 2019-04-14 | End: 2019-04-14

## 2019-04-14 RX ORDER — MAGNESIUM SULFATE 1 G/100ML
1 INJECTION INTRAVENOUS PRN
Status: DISCONTINUED | OUTPATIENT
Start: 2019-04-14 | End: 2019-04-19 | Stop reason: HOSPADM

## 2019-04-14 RX ORDER — ROSUVASTATIN CALCIUM 10 MG/1
10 TABLET, COATED ORAL NIGHTLY
Status: DISCONTINUED | OUTPATIENT
Start: 2019-04-14 | End: 2019-04-19 | Stop reason: HOSPADM

## 2019-04-14 RX ORDER — CETIRIZINE HYDROCHLORIDE 10 MG/1
10 TABLET ORAL DAILY PRN
Status: DISCONTINUED | OUTPATIENT
Start: 2019-04-14 | End: 2019-04-19 | Stop reason: HOSPADM

## 2019-04-14 RX ORDER — OXYCODONE HYDROCHLORIDE AND ACETAMINOPHEN 5; 325 MG/1; MG/1
1 TABLET ORAL EVERY 4 HOURS PRN
Status: DISCONTINUED | OUTPATIENT
Start: 2019-04-14 | End: 2019-04-19 | Stop reason: HOSPADM

## 2019-04-14 RX ORDER — FAMOTIDINE 20 MG/1
20 TABLET, FILM COATED ORAL 2 TIMES DAILY
Status: DISCONTINUED | OUTPATIENT
Start: 2019-04-14 | End: 2019-04-19 | Stop reason: HOSPADM

## 2019-04-14 RX ORDER — NITROGLYCERIN 0.4 MG/1
0.4 TABLET SUBLINGUAL EVERY 5 MIN PRN
Status: DISCONTINUED | OUTPATIENT
Start: 2019-04-14 | End: 2019-04-19 | Stop reason: HOSPADM

## 2019-04-14 RX ORDER — FLUOXETINE HYDROCHLORIDE 20 MG/1
60 CAPSULE ORAL DAILY
Status: DISCONTINUED | OUTPATIENT
Start: 2019-04-14 | End: 2019-04-19 | Stop reason: HOSPADM

## 2019-04-14 RX ORDER — POLYETHYLENE GLYCOL 3350 17 G/17G
17 POWDER, FOR SOLUTION ORAL DAILY PRN
Status: DISCONTINUED | OUTPATIENT
Start: 2019-04-14 | End: 2019-04-19 | Stop reason: HOSPADM

## 2019-04-14 RX ORDER — OYSTER SHELL CALCIUM WITH VITAMIN D 500; 200 MG/1; [IU]/1
1 TABLET, FILM COATED ORAL EVERY MORNING
Status: DISCONTINUED | OUTPATIENT
Start: 2019-04-14 | End: 2019-04-14 | Stop reason: SDUPTHER

## 2019-04-14 RX ORDER — SODIUM CHLORIDE 0.9 % (FLUSH) 0.9 %
10 SYRINGE (ML) INJECTION PRN
Status: DISCONTINUED | OUTPATIENT
Start: 2019-04-14 | End: 2019-04-19 | Stop reason: HOSPADM

## 2019-04-14 RX ORDER — ONDANSETRON 2 MG/ML
4 INJECTION INTRAMUSCULAR; INTRAVENOUS EVERY 6 HOURS PRN
Status: DISCONTINUED | OUTPATIENT
Start: 2019-04-14 | End: 2019-04-19 | Stop reason: HOSPADM

## 2019-04-14 RX ORDER — SODIUM CHLORIDE 0.9 % (FLUSH) 0.9 %
10 SYRINGE (ML) INJECTION EVERY 12 HOURS SCHEDULED
Status: DISCONTINUED | OUTPATIENT
Start: 2019-04-14 | End: 2019-04-19 | Stop reason: HOSPADM

## 2019-04-14 RX ORDER — POTASSIUM CHLORIDE 20 MEQ/1
40 TABLET, EXTENDED RELEASE ORAL PRN
Status: DISCONTINUED | OUTPATIENT
Start: 2019-04-14 | End: 2019-04-19 | Stop reason: HOSPADM

## 2019-04-14 RX ORDER — POTASSIUM CHLORIDE 7.45 MG/ML
10 INJECTION INTRAVENOUS PRN
Status: DISCONTINUED | OUTPATIENT
Start: 2019-04-14 | End: 2019-04-19 | Stop reason: HOSPADM

## 2019-04-14 RX ORDER — PHYTONADIONE 5 MG/1
5 TABLET ORAL ONCE
Status: COMPLETED | OUTPATIENT
Start: 2019-04-14 | End: 2019-04-14

## 2019-04-14 RX ORDER — DIPHENHYDRAMINE HYDROCHLORIDE 50 MG/ML
50 INJECTION INTRAMUSCULAR; INTRAVENOUS ONCE
Status: COMPLETED | OUTPATIENT
Start: 2019-04-14 | End: 2019-04-14

## 2019-04-14 RX ORDER — 0.9 % SODIUM CHLORIDE 0.9 %
250 INTRAVENOUS SOLUTION INTRAVENOUS ONCE
Status: COMPLETED | OUTPATIENT
Start: 2019-04-14 | End: 2019-04-14

## 2019-04-14 RX ORDER — CHOLECALCIFEROL (VITAMIN D3) 125 MCG
1000 CAPSULE ORAL DAILY
Status: DISCONTINUED | OUTPATIENT
Start: 2019-04-14 | End: 2019-04-19 | Stop reason: HOSPADM

## 2019-04-14 RX ORDER — PROTAMINE SULFATE 10 MG/ML
50 INJECTION, SOLUTION INTRAVENOUS ONCE
Status: COMPLETED | OUTPATIENT
Start: 2019-04-14 | End: 2019-04-14

## 2019-04-14 RX ORDER — PREGABALIN 50 MG/1
100 CAPSULE ORAL 2 TIMES DAILY
Status: DISCONTINUED | OUTPATIENT
Start: 2019-04-14 | End: 2019-04-19 | Stop reason: HOSPADM

## 2019-04-14 RX ORDER — METOPROLOL SUCCINATE 25 MG/1
25 TABLET, EXTENDED RELEASE ORAL DAILY
Status: DISCONTINUED | OUTPATIENT
Start: 2019-04-14 | End: 2019-04-19 | Stop reason: HOSPADM

## 2019-04-14 RX ADMIN — DIPHENHYDRAMINE HYDROCHLORIDE 50 MG: 50 INJECTION, SOLUTION INTRAMUSCULAR; INTRAVENOUS at 04:18

## 2019-04-14 RX ADMIN — HYDROCORTISONE SODIUM SUCCINATE 200 MG: 100 INJECTION, POWDER, FOR SOLUTION INTRAMUSCULAR; INTRAVENOUS at 04:18

## 2019-04-14 RX ADMIN — PREGABALIN 100 MG: 50 CAPSULE ORAL at 21:02

## 2019-04-14 RX ADMIN — Medication 10 ML: at 09:18

## 2019-04-14 RX ADMIN — PHYTONADIONE 5 MG: 5 TABLET ORAL at 05:25

## 2019-04-14 RX ADMIN — FAMOTIDINE 20 MG: 20 TABLET ORAL at 09:18

## 2019-04-14 RX ADMIN — Medication 1 TABLET: at 17:25

## 2019-04-14 RX ADMIN — CYANOCOBALAMIN TAB 500 MCG 1000 MCG: 500 TAB at 09:18

## 2019-04-14 RX ADMIN — ROSUVASTATIN CALCIUM 10 MG: 10 TABLET, FILM COATED ORAL at 21:02

## 2019-04-14 RX ADMIN — PROTAMINE SULFATE 50 MG: 10 INJECTION, SOLUTION INTRAVENOUS at 05:25

## 2019-04-14 RX ADMIN — FLUOXETINE 60 MG: 20 CAPSULE ORAL at 09:18

## 2019-04-14 RX ADMIN — POLYETHYLENE GLYCOL 3350 17 G: 17 POWDER, FOR SOLUTION ORAL at 22:39

## 2019-04-14 RX ADMIN — PREGABALIN 100 MG: 50 CAPSULE ORAL at 09:18

## 2019-04-14 RX ADMIN — OXYCODONE HYDROCHLORIDE AND ACETAMINOPHEN 1 TABLET: 5; 325 TABLET ORAL at 21:11

## 2019-04-14 RX ADMIN — ONDANSETRON 4 MG: 2 INJECTION INTRAMUSCULAR; INTRAVENOUS at 03:29

## 2019-04-14 RX ADMIN — METOPROLOL SUCCINATE 25 MG: 25 TABLET, EXTENDED RELEASE ORAL at 09:18

## 2019-04-14 RX ADMIN — Medication 10 ML: at 21:03

## 2019-04-14 RX ADMIN — HYDROMORPHONE HYDROCHLORIDE 0.5 MG: 1 INJECTION, SOLUTION INTRAMUSCULAR; INTRAVENOUS; SUBCUTANEOUS at 03:29

## 2019-04-14 RX ADMIN — OXYCODONE HYDROCHLORIDE AND ACETAMINOPHEN 1 TABLET: 5; 325 TABLET ORAL at 16:50

## 2019-04-14 RX ADMIN — SODIUM CHLORIDE 250 ML: 9 INJECTION, SOLUTION INTRAVENOUS at 05:25

## 2019-04-14 RX ADMIN — FAMOTIDINE 20 MG: 20 TABLET ORAL at 21:03

## 2019-04-14 RX ADMIN — IOPAMIDOL 90 ML: 755 INJECTION, SOLUTION INTRAVENOUS at 05:06

## 2019-04-14 SDOH — HEALTH STABILITY: MENTAL HEALTH: HOW OFTEN DO YOU HAVE A DRINK CONTAINING ALCOHOL?: MONTHLY OR LESS

## 2019-04-14 ASSESSMENT — PAIN SCALES - GENERAL
PAINLEVEL_OUTOF10: 7
PAINLEVEL_OUTOF10: 9
PAINLEVEL_OUTOF10: 7
PAINLEVEL_OUTOF10: 8
PAINLEVEL_OUTOF10: 9

## 2019-04-14 ASSESSMENT — ENCOUNTER SYMPTOMS
SHORTNESS OF BREATH: 0
EYE PAIN: 0
DIARRHEA: 0
ABDOMINAL PAIN: 0
VOMITING: 0

## 2019-04-14 ASSESSMENT — PAIN DESCRIPTION - ORIENTATION
ORIENTATION: RIGHT
ORIENTATION: RIGHT

## 2019-04-14 ASSESSMENT — PAIN DESCRIPTION - PAIN TYPE: TYPE: ACUTE PAIN

## 2019-04-14 ASSESSMENT — PAIN DESCRIPTION - LOCATION
LOCATION: LEG
LOCATION: LEG

## 2019-04-14 NOTE — PROGRESS NOTES
Pt. known to me from prior hospitalization for the same problem. He rebled into the right posterior thigh, likely secondary to lovenox, coumadin, plavix. I have reviewed the CTA report and images. There is no active arterial blush that I could offer any endovascular coil embolization. I will defer to orthopedic surgery for their recommendations regarding hematomas and possible need for surgery as I do not have experience with thigh decompression/fasciotomy. I would strongly recommend discontinuing ALL anticoagulation (plavix, coumadin, and especially lovenox) for at least 1 week. I would then restart coumadin and aspirin (no lovenox, no plavix). He has a permanent IVC filter placed in [place] some time ago. I will sign off for now.

## 2019-04-14 NOTE — CONSULTS
PROSTATE SURGERY      april 2006   [address] and 1997    lumbar x 2    SPINE SURGERY  1999    neck ?  TONSILLECTOMY      1970    TUMOR REMOVAL Right 1973    foot    VENA CAVA FILTER PLACEMENT      [id number]       Current Medications:   Prior to Admission medications    Medication Sig Start Date End Date Taking? Authorizing Provider   enoxaparin (LOVENOX) 120 MG/0.8ML injection Inject 1 mg/kg into the skin every 12 hours   Yes Historical Provider, MD   clopidogrel (PLAVIX) 75 MG tablet Take 1 tablet by mouth daily 4/15/19  Yes Glenys Love MD   warfarin (COUMADIN) 10 MG tablet Take 1 tablet by mouth daily for 2 days 4/6/19 4/14/19 Yes Glenys Love MD   warfarin (COUMADIN) 7.5 MG tablet Take 1 tablet by mouth daily 4/8/19  Yes Glenys Love MD   pregabalin (LYRICA) 100 MG capsule Take 1 capsule by mouth 2 times daily for 30 days. 4/3/19 5/3/19 Yes Glenys Love MD   metoprolol succinate (TOPROL XL) 25 MG extended release tablet Take 25 mg by mouth daily   Yes Historical Provider, MD   Multiple Vitamins-Minerals (MULTIVITAMIN ADULT PO) Take 1 tablet by mouth daily    Yes Historical Provider, MD   famotidine (PEPCID) 20 MG tablet Take 1 tablet by mouth 2 times daily. 6/13/13  Yes Sharri Abrams MD   FLUoxetine (PROZAC) 20 MG capsule Take 3 capsules by mouth daily. 6/13/13  Yes Sharri Abrams MD   rosuvastatin (CRESTOR) 10 MG tablet Take 1 tablet by mouth daily. Patient taking differently: Take 10 mg by mouth nightly  6/13/13  Yes Sharri Abrams MD   vitamin B-12 (CYANOCOBALAMIN) 1000 MCG tablet Take 1,000 mcg by mouth daily.      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 Family History:   Family History   Problem Relation Age of Onset    Cancer Mother         of the lymph nodes in her neck    Cancer Father         lung cancer    Other Father         4-5 PPD smokes    Obesity Sister     Alcohol Abuse Sister     Other Sister         tobacco abuse    Heart Disease Brother     Other Brother         smoker    Other Brother          in accident at age 48 years   [facility] Alcohol Abuse Brother     Liver Disease Brother     Heart Disease Brother     No Known Problems Sister     Other Son         blood disorder - undiagnosed    Kidney Disease Daughter     Other Daughter         blood disorder - Protein S Deficiency    Hemochromatosis Daughter        REVIEW OF SYSTEMS:  14 point review of systems has been reviewed from the patient's emergency room visit, reviewed with the patient on today's date with no new changes. PHYSICAL EXAM:      Physical Examination:  Vitals:   Vitals:    19 0651 19 0808 19 0808 19 1023   BP: 124/74  122/65 116/62   Pulse: 60  57 73   Resp: 16 18 20 20   Temp: 98.4 °F (36.9 °C)  96.8 °F (36 °C) 97.2 °F (36.2 °C)   TempSrc:   Temporal Temporal   SpO2: 93% 94% 99% 97%   Weight:       Height:         General:  Appears stated age, no distress. Orientation:  Alert and oriented to time, place, and person. Mood and Affect:  Cooperative and pleasant. Gait:  Resting comfortably in bed. Cardiovascular:  Symmetric 1-2 plus pulses in upper and lower extremities. Lymph:  No cervical or inguinal lymphadenopathy noted.   Sensation:  Grossly intact to light touch.  DTR:  Normal, no pathologic reflexes. Coordination/balance:  Normal    Musculoskeletal:  Right upper extremity exam:  There is no tenderness to palpation about the shoulder, elbow, wrist or hand. Unrestricted full function motion is present. Stability is normal with provocative tests, 5/5 strength, and skin is normal.      Left upper extremity exam:  There is no tenderness to palpation about the shoulder, elbow, wrist or hand. Unrestricted full function motion is present. Stability is normal with provocative tests, 5/5 strength, and skin is normal.     Right lower extremity exam:  There is tenderness to palpation about the hip, thigh, knee, ankle, calf and foot. Limited motion is present, though the patient tolerates passive stretch fairly well. His proximal right lower extremity muscle compartments are full but not tense and he has ecchymosis and bruising around his lower abdomen. Left lower extremity exam:  There is no tenderness to palpation about the hip, knee, ankle or foot. Unrestricted full function motion is present.   Stability is normal with provocative tests, 5/5 strength, and skin is normal.      DATA:    CBC with Differential:    Lab Results   Component Value Date    WBC 7.1 04/14/2019    RBC 2.13 04/14/2019    HGB 7.6 04/14/2019    HCT 24.0 04/14/2019     04/14/2019    .7 04/14/2019    MCH 35.7 04/14/2019    MCHC 31.7 04/14/2019    RDW 17.6 04/14/2019    LYMPHOPCT 16.2 03/28/2019    MONOPCT 12.8 03/28/2019    BASOPCT 0.2 03/28/2019    MONOSABS 1.20 03/28/2019    LYMPHSABS 1.6 03/28/2019    EOSABS 0.10 03/28/2019    BASOSABS 0.00 03/28/2019     CMP:    Lab Results   Component Value Date     04/14/2019    K 4.2 04/14/2019    K 4.5 01/08/2019     04/14/2019    CO2 24 04/14/2019    BUN 16 04/14/2019    CREATININE 0.9 04/14/2019    LABGLOM >60 04/14/2019    GLUCOSE 125 04/14/2019    PROT 5.8 03/31/2019    CALCIUM 8.0 04/14/2019    BILITOT 0.6 03/31/2019 ALKPHOS 66 03/31/2019    AST 31 03/31/2019    ALT 49 03/31/2019     BMP:    Lab Results   Component Value Date     04/14/2019    K 4.2 04/14/2019    K 4.5 01/08/2019     04/14/2019    CO2 24 04/14/2019    BUN 16 04/14/2019    CREATININE 0.9 04/14/2019    CALCIUM 8.0 04/14/2019    LABGLOM >60 04/14/2019    GLUCOSE 125 04/14/2019         Radiology: I have reviewed the radiology images listed below and agree with the findings of the interpreting radiologist(s). Cta Lower Extremity Right W Wo Contrast    Result Date: 4/14/2019  Exam:   CTA LOWER EXTREMITY RIGHT W WO CONTRAST  Date:  4/14/2019 History:  Male, age  72 years; leg swelling COMPARISON:  None. Findings : Vascular findings: The right common iliac and extra iliac arteries demonstrate atherosclerotic disease, without flow-limiting stenosis. The right common femoral, superficial femoral, the profunda, popliteal arteries demonstrate no flow-limiting stenosis or occlusion. There is appropriate trifurcation of the popliteal artery with mild stenosis. No occlusion identified. Nonvascular findings: There is edema circumferentially involving the right lower extremity from the acetabulum to the foot. Additionally, there are organized fluid collections, without enhancement identified in the posterior aspect of the right lower extremity, favored to be hematomas. Superiorly there is a fluid collection measuring 14 x 4 x 5.5 cm. More inferiorly and still in the posterior compartment an additional discrete fluid collection measuring 13 x 5.5 x 8.5 cm. An additional adjacent inferior fluid collection measuring 2.2 x 2.9 x 2.1 cm is noted. There is no acute osseous pathology. Impression: 1. No occlusion or high-grade stenosis in the left humerus fixation of the lumbar extremity. 2. 3 discrete hematoma above the calf as described above.  Signed by Dr Gordon Carreno on 4/14/2019 9:38 AM    --------------------------------------------------------------------------------------------------------------------    Assessment: Venous hematoma of the right posterior thigh presumably due to an old hamstring injury for the past 3 weeks    Plan:  1) presently no immediate concern for surgical intervention, but we will continue serial monitoring to determine if fasciotomy is necessary in the setting of developing compartment syndrome - we discussed risks, benefits, and alternatives and patient wishes to proceed  2) Admit for pain control, PT/OT  3) weightbearing as tolerated right lower extremity     Electronically signed by Emmett Bruno MD on 4/14/2019 at 11:22 AM

## 2019-04-14 NOTE — CONSULTS
MEDICAL ONCOLOGY CONSULTATION    Pt Name: Jeimy Olsen  MRN: [id number]  YOB: 1945    Date of Admission: 4/14/2019  Date of Consultation: 4/14/2019  Room: 1    Requesting Physician: Dr. Christoph Leonard  Reason for Consultation: RLE swelling    History Obtained From:  Patient, EMR    HISTORY OF PRESENT ILLNESS:      Mr. Quentin Sierra was seen in initial consultation on 3/30/2019 during hospitalization at [facility] admitted with right lower extremity thigh pain with decreased mobility with chronic deep vein thrombosis involving the distal femoral and chronic superficial thrombophlebitis in short saphous vein as documented on 3/28/2019 study. 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 He has a chronic indwelling Lorida filter in place. Radha Rodrigues had had a pain/stimulator placed by Dr. Debbie Hoskins on 3/20/2019 for chronic back and bilateral leg pain. For the procedure he was placed on Lovenox. Warfarin was resumed on 3/28/2019 with an INR of 2.6. His coagulation laboratory parameters are being checked in [facility].     Venous duplex of RLE on 3/28/2019- Chronic deep vein thrombosis involving the distal femoral and chronic superficial thrombophlebitis in short saphous vein     CT of right femur on 3/29/2019- Severe right hamstring tendinosis with ischiogluteal bursitis. Nonspecific elongated fluid collection in the central right thigh.  Intact right femur without fracture or dislocation of the hip or knee joints.     Medical records Dr. Amaris Meng 4/14/2014:  Dr. Chris Jim's consultation note from 4/14/2014 documented that Radha Rodrigues had a strong family history of clotting disorders to include his sister developed a DVT at the age of 12, his son developed DVT at the age of 12 and a daughter developed DVT and PE while on birth control with a low protein S.     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 Evans Robledo is readmitted 4/14/19 for significant RLE swelling. Yesterday, 4/13/2019, he sat out in the yard with his grandson working on flowers. He states that he sat in a folding lawn chair, the typical kind with the bar across the front, somewhat reclining, that puts pressure right above the knee posteriorly, the precise area of his newly developed hematoma. He states that the symptoms of the right lower extremity began yesterday afternoon after being out in the yard in the lawn chair.      - CTA RLE 4/14/19 there is no evidence of right arterial occlusion or high-grade stenosis Three discrete hematomas above the calf, measuring (1) 14 x 4 x 5.5 cm  (2) 13 x 5.5 x 8.5 cm  (3) 2.2 x 2.9 x 2.1 cm. No occlusion or high-grade stenosis in the  right lower extremity. Hematology consultation is requested.     History  Past Medical History:   Diagnosis Date    Anxiety and depression 12/11/2017    B12 deficiency     CAD (coronary artery disease)     sees dr. combs    Cervical radiculopathy     Chronic bilateral low back pain without sciatica 3/20/2019    Diverticular disease     DVT (deep venous thrombosis) (HCC)     Erectile dysfunction     GERD (gastroesophageal reflux disease)     Hyperlipidemia     Hypertension     Morbid obesity ([address]     OCD (obsessive compulsive disorder)     ANDREW (obstructive sleep apnea)     CPAP 12    Paroxysmal atrial fibrillation ([address]     Unstable angina pectoris ([address]      Past Surgical History:   Procedure Laterality Date    BACK SURGERY      total x 5 : lower 1983, lower 1997, upper 1999, [id number] [facility]  2001 and 2004    stents    CARPAL TUNNEL RELEASE Left         CATARACT REMOVAL Bilateral 2005    L in Dec, R in Oct    CORONARY ANGIOPLASTY WITH STENT PLACEMENT      2004,  [place], [place], [id number]    CORONARY ARTERY BYPASS GRAFT      [place], [place]    GASTRIC BYPASS SURGERY      HERNIA REPAIR      may 2005    IMPLANTATION VAGAL NERVE STIMULATOR N/A 3/20/2019    SPINAL CORD STIMULATOR PERMANENT PLACEMENT performed by [facility]  at [address]      left total knee    KNEE SURGERY Left     meniscus repair in [address] in   [address] [facility]      2006   [address] and     lumbar x 2   [address]    neck ?  TONSILLECTOMY      1970    TUMOR REMOVAL Right 1973    foot    VENA CAVA FILTER PLACEMENT      [id number]     Family History   Problem Relation Age of Onset    Cancer Mother         of the lymph nodes in her neck    Cancer Father         lung cancer    Other Father         4-5 PPD smokes    Obesity Sister     Alcohol Abuse Sister     Other Sister         tobacco abuse    Heart Disease Brother     Other Brother         smoker    Other Brother          in accident at age 48 years   [employer] Alcohol Abuse Brother     Liver Disease Brother     Heart Disease Brother     No Known Problems Sister     Other Son         blood disorder - undiagnosed    Kidney Disease Daughter     Other Daughter         blood disorder - Protein S Deficiency    Hemochromatosis Daughter       Social History     Tobacco Use    Smoking status: Never Smoker    Smokeless tobacco: Never Used   Substance Use Topics    Alcohol use:  Yes     Alcohol/week: 0.6 oz     Types: 1 Cans of beer per week     Frequency: Monthly or less     Comment: has 1 beer a monh with a piece of pizza    Drug use: No     Medications Prior to Admission: enoxaparin (LOVENOX) 120 MG/0.8ML injection, Inject 1 mg/kg into the skin every 12 hours  [START ON 4/15/2019] clopidogrel (PLAVIX) 75 MG tablet, Take 1 tablet by mouth daily  warfarin (COUMADIN) 10 MG tablet, Take 1 tablet by mouth daily for 2 days  warfarin (COUMADIN) 7.5 MG tablet, Take 1 tablet by mouth daily  pregabalin (LYRICA) 100 MG capsule, Take 1 capsule by mouth 2 times daily for 30 days. metoprolol succinate (TOPROL XL) 25 MG extended release tablet, Take 25 mg by mouth daily  Multiple Vitamins-Minerals (MULTIVITAMIN ADULT PO), Take 1 tablet by mouth daily   famotidine (PEPCID) 20 MG tablet, Take 1 tablet by mouth 2 times daily. FLUoxetine (PROZAC) 20 MG capsule, Take 3 capsules by mouth daily. rosuvastatin (CRESTOR) 10 MG tablet, Take 1 tablet by mouth daily. (Patient taking differently: Take 10 mg by mouth nightly )  vitamin B-12 (CYANOCOBALAMIN) 1000 MCG tablet, Take 1,000 mcg by mouth daily. Calcium Carbonate-Vitamin D (CALCIUM 600+D PO), Take 1 tablet by mouth every morning   cetirizine (ZYRTEC) 10 MG tablet, Take 10 mg by mouth daily  nitroGLYCERIN (NITROSTAT) 0.4 MG SL tablet, Place 1 tablet under the tongue every 5 minutes as needed for Chest pain   Scheduled Meds:     hydrocortisone sodium succinate PF  200 mg Intravenous Q6H    sodium chloride  250 mL Intravenous Once    sodium chloride flush  10 mL Intravenous 2 times per day    calcium-vitamin D  1 tablet Oral QAM    famotidine  20 mg Oral BID    FLUoxetine  60 mg Oral Daily    metoprolol succinate  25 mg Oral Daily    pregabalin  100 mg Oral BID    rosuvastatin  10 mg Oral Nightly    vitamin B-12  1,000 mcg Oral Daily     PRN Meds:  sodium chloride flush, ondansetron, potassium chloride **OR** potassium alternative oral replacement **OR** potassium chloride, magnesium sulfate, polyethylene glycol, cetirizine, nitroGLYCERIN   Allergies:  Iodine; Morphine; and Shellfish-derived products    Subjective .      REVIEW OF SYSTEMS:   History obtained from chart review and the patient  General: positive for  - fatigue   ENT: negative for - oral lesions  Allergy and Immunology: negative Hematological and Lymphatic: negative for - weight loss  Respiratory: negative for - cough and shortness of breath  Cardiovascular: positive for - RLE swelling, hematomas  Gastrointestinal: negative for - appetite loss   Genito-Urinary: negative for - dysuria or hematuria  Musculoskeletal: positive for - RLE swelling  Neurological: negative for - confusion, dizziness or headaches  Dermatological: negative for - pruritus and rash    Objective     Vital Signs   /62   Pulse 73   Temp 97.2 °F (36.2 °C) (Temporal)   Resp 20   Ht 5' 8\" (1.727 m)   Wt 265 lb (120.2 kg)   SpO2 97%   BMI 40.29 kg/m²     PHYSICAL EXAMINATION:  General Appearance: Alert, cooperative, in no acute distress  Head: Normocephalic, without obvious abnormality, atraumatic  Eyes: Normal conjunctivae and sclerae normal, anicteric, no pallor, corneas clear  Ears: Ears appear intact with no abnormalities noted, hearing grossly normal  Throat: No oral lesions, no thrush, oral mucosa moist  Neck: No adenopathy, supple, trachea midline, no JVD  Lungs: Clear to auscultation, respirations regular, even and unlabored  Heart: Regular rhythm and normal rate  Abdomen: Normal bowel sounds, no masses, no organomegaly, soft non-tender, non-distended, no guarding, no rebound tenderness  Genitalia: Deferred  Extremities: edema, tenderness RLE  Skin: No bleeding or rash  Lymph nodes: No palpable adenopathy  Neurologic: Grossly intact though not formally tested    Labs:  CBC:   Recent Labs     04/14/19  0328   WBC 7.1   HGB 7.6*        CMP:   Recent Labs     04/14/19  0328   GLUCOSE 125*   BUN 16   CREATININE 0.9   CO2 24   CALCIUM 8.0*     Hepatic: No results for input(s): AST, ALT, ALB, BILITOT, ALKPHOS in the last 72 hours. Troponin: No results for input(s): TROPONINI in the last 72 hours. BNP: No results for input(s): BNP in the last 72 hours. Lipids: No results for input(s): CHOL, HDL in the last 72 hours.     Invalid input(s): LDL  INR: high-grade stenosis in the  right lower extremity. 2. 3 discrete hematoma above the calf as described above. Signed by Dr Pascale Garcia on 4/14/2019       Active Problems:    Chronic blood loss anemia    Hematoma of leg, unspecified laterality, initial encounter  Resolved Problems:    * No resolved hospital problems. *      ASSESSMENT/PLAN:    RLE pain/swelling    Mr. Warren Padron was seen in initial consultation on 3/30/2019 during hospitalization at [facility] admitted with right lower extremity thigh pain with decreased mobility with chronic deep vein thrombosis involving the distal femoral and chronic superficial thrombophlebitis in short saphous vein as documented on 3/28/2019 study. 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 He has a chronic indwelling [place] filter in place. Mack Gan had had a pain/stimulator placed by Dr. Shraddha Shi on 3/20/2019 for chronic back and bilateral leg pain. For the procedure he was placed on Lovenox. Warfarin was resumed on 3/28/2019 with an INR of 2.6. His coagulation laboratory parameters are being checked in [facility].     Venous duplex of RLE on 3/28/2019- Chronic deep vein thrombosis involving the distal femoral and chronic superficial thrombophlebitis in short saphous vein     CT of right femur on 3/29/2019- Severe right hamstring tendinosis with ischiogluteal bursitis. Nonspecific elongated fluid collection in the central right thigh. Intact right femur without fracture or dislocation of the hip or knee joints.     Mack Gan was discharged 4/3/19 with plans to resume anticoagulation on 4/6/19, bridge with Lovenox to Coumadin. Mack Gan is readmitted 4/14/19 for significant RLE swelling. Yesterday, 4/13/2019, he sat out in the yard with his grandson working on flowers.  He states that he sat in a folding lawn chair, the typical kind with the bar across the front, somewhat reclining, that puts pressure right above the knee posteriorly, the precise area of his newly developed hematoma. He states that the symptoms of the right lower extremity began yesterday afternoon after being out in the yard in the lawn chair.    - CTA RLE 4/14/19 there is no evidence of right arterial occlusion or high-grade stenosis Three discrete hematomas above the calf, measuring (1) 14 x 4 x 5.5 cm  (2) 13 x 5.5 x 8.5 cm  (3) 2.2 x 2.9 x 2.1 cm. No occlusion or high-grade stenosis in the  right lower extremity. Hematology consultation is requested. - CTA RLE 4/14/19 there is no evidence of right arterial occlusion or high-grade stenosis. Three discrete hematomas above the calf, measuring (1) 14 x 4 x 5.5 cm  (2) 13 x 5.5 x 8.5 cm  (3) 2.2 x 2.9 x 2.1 cm    - evaluated by ortho, Dr. Darin Mcnally who felt there were no immediate concerns for surgical intervention, however monitoring closely for compartment syndrome and need for fasciotomy  - followed by vascular surgery, Dr. Bolivar Tristan as well         GEE Gene Homozygous Mutation, chronic anticoagulation with Warfarin (managed by Dr. Soumya Cedillo)   Faraz Frye had a chronic indwelling IVC [place] filter    Admit coags today 4/14/2019 3:28 AM  -  PT/INR 29.4 / 2.88  -  PTT 99.4    -  Vitamin K 5 mg today per Dr. Bolivar Tristan  -  1 unit FFP, per Dr. Bolivar Tristan      Macrocytic anemia  -  HgB 7.6, .7     Serology 3/29/19:  · Ferritin 108.5  · Iron 87  · Iron saturation 29%   · TIBC 296  · Folate 14.7  · Vitamin B12 795  · Reticulocyte count 0.1022  · Stool for occult blood negative on 4/3/2019  ·   -  May need to consider bone marrow aspiration and biopsy in the future due to elevated MCV to R/O MDS. Zetta Bumpers [place]  04/14/19  10:42 AM    Agree with current management. Will follow with you.     I personally saw and examined this patient, performing a face-to-face diagnostic evaluation with plan of care reviewed and developed with Jeanne Reyes SANTY  and nursing staff. I have addended and/or modified the above history of present illness, physical examination, and assessment and plan to reflect my findings and impressions. Essential elements of the care plan were discussed with Raymundo MADERA . Agree with findings and assessment/plan as documented above. Questions were encouraged, asked and answered to their understanding and satisfaction.     Electronically signed by Mine Junior MD on 4/14/19 at 12:40 PM

## 2019-04-14 NOTE — ED PROVIDER NOTES
[facility] EMERGENCY DEPT  eMERGENCY dEPARTMENT eNCOUnter      Pt Name: Amaury Briceño  MRN: [id number]  [place] 1945  Date of evaluation: 4/14/2019  Provider: Cydney Cushing, MD    [address] [place] [address]       Chief Complaint   Patient presents with    Leg Pain     right leg pain and swelling; pt has known blood clots         HISTORY OF PRESENT ILLNESS   (Location/Symptom, Timing/Onset,Context/Setting, Quality, Duration, Modifying Factors, Severity)  Note limiting factors. Amaury Briceño is a 72 y.o. male who presents to the emergency department complaining of right leg pain and swelling. Seen recently in the hospital for avulsion of hamstring muscle. This resulted in developing a hematoma that led to anemia. Patient was seen by orthopedist and vasulcar surgery. Symptoms gradually improved and patient was discharged and put back on Lovenox and Coumadin. He is still on Lovenox and Coumadin. Swelling has been getting better but last night developed significant increase in pain and swelling to the right lower extremity. Has severe swelling involving the entire right lower extremity. No numbness weakness. No fevers chills or other constitutional symptoms. HPI    NursingNotes were reviewed. REVIEW OF SYSTEMS    (2-9 systems for level 4, 10 or more for level 5)     Review of Systems   Constitutional: Negative for fever. Eyes: Negative for pain. Respiratory: Negative for shortness of breath. Cardiovascular: Positive for leg swelling (RLE). Negative for chest pain and palpitations. Gastrointestinal: Negative for abdominal pain, diarrhea and vomiting. Genitourinary: Negative for dysuria. Skin: Negative for rash. Neurological: Negative for weakness, numbness and headaches. All other systems reviewed and are negative. A complete review of systems was performed and is negative except as noted above in the HPI.        PAST MEDICAL HISTORY     Past Medical History:   Diagnosis Date    Anxiety 12/11/2017    Atherosclerotic coronary vascular disease     CAD (coronary artery disease)     sees dr. combs    Cervical radiculopathy     Chronic bilateral low back pain without sciatica 3/20/2019    Depression     Diverticular disease     D[facility]deep venous thrombosis) ([facility]     Erectile dysfunction     GERD (gastroesophageal reflux disease)     Hyperlipidemia     Hypertension     Morbid obesity ([address]     Obesity     OCD (obsessive compulsive disorder)     Paroxysmal atrial fibrillation ([address]     Unspecified sleep apnea     bpap    Unstable angina pectoris ([address]          SURGICAL HISTORY       Past Surgical History:   Procedure Laterality Date    BACK SURGERY      total x 5    CARDIAC SURGERY  2001 and 2004    stents    CARPAL TUNNEL RELEASE      CATARACT REMOVAL  2005    CORONARY ANGIOPLASTY WITH STENT PLACEMENT  12/07/2004    in [facility]    CORON[facility]RAFT  2011    [facility]    EYE SURGERY      GASTRIC BYPASS SURGERY  2002    HERNIA REPAIR      IMPLANTATION VAGAL NERVE STIMULATOR N/A 3/20/2019    SPINAL CORD STIMULATOR PERMANENT PLACEMENT performed by Bobby Soriano DO at [address]  2006    left total knee    KNEE SURGERY Left 1969    meniscus repair in [address] in   [facility], [place], operated by [facility] and 1997    lumbar x 2   [address]    neck ?  TONSILLECTOMY      TUMOR REMOVAL Right 1973    foot    VENA CAVA FILTER PLACEMENT      WRIST SURGERY           CURRENT MEDICATIONS       Previous Medications    CALCIUM CARBONATE-VITAMIN D (CALCIUM 600+D PO)    Take 1 tablet by mouth every morning     CETIRIZINE (ZYRTEC) 10 MG TABLET    Take 10 mg by mouth daily    CLOPIDOGREL (PLAVIX) 75 MG TABLET    Take 1 tablet by mouth daily    ENOXAPARIN (LOVENOX) 120 MG/0.8ML INJECTION    Inject 1 mg/kg into the skin every 12 hours    FAMOTIDINE (PEPCID) 20 MG TABLET    Take 1 tablet by mouth 2 times daily.     FLUOXETINE (PROZAC) 20 MG CAPSULE    Take 3 capsules by mouth daily. METOPROLOL SUCCINATE (TOPROL XL) 25 MG EXTENDED RELEASE TABLET    Take 25 mg by mouth daily    MULTIPLE VITAMINS-MINERALS (MULTIVITAMIN ADULT PO)    Take 1 tablet by mouth daily     NITROGLYCERIN (NITROSTAT) 0.4 MG SL TABLET    Place 1 tablet under the tongue every 5 minutes as needed for Chest pain    PREGABALIN (LYRICA) 100 MG CAPSULE    Take 1 capsule by mouth 2 times daily for 30 days. ROSUVASTATIN (CRESTOR) 10 MG TABLET    Take 1 tablet by mouth daily. VITAMIN B-12 (CYANOCOBALAMIN) 1000 MCG TABLET    Take 1,000 mcg by mouth daily.       WARFARIN (COUMADIN) 10 MG TABLET    Take 1 tablet by mouth daily for 2 days    WARFARIN (COUMADIN) 7.5 MG TABLET    Take 1 tablet by mouth daily       ALLERGIES     Iodine; Morphine; and Shellfish-derived products    FAMILY HISTORY       Family History   Problem Relation Age of Onset    Cancer Mother     Cancer Father           SOCIAL HISTORY       Social History     Socioeconomic History    Marital status:      Spouse name: None    Number of children: None    Years of education: None    Highest education level: None   Occupational History    None   Social Needs    Financial resource strain: None    Food insecurity:     Worry: None     Inability: None    Transportation needs:     Medical: None     Non-medical: None   Tobacco Use    Smoking status: Never Smoker    Smokeless tobacco: Never Used   Substance and Sexual Activity    Alcohol use: Yes     Comment: rarely    Drug use: No    Sexual activity: None   Lifestyle    Physical activity:     Days per week: None     Minutes per session: None    Stress: None   Relationships    Social connections:     Talks on phone: None     Gets together: None     Attends Mosque service: None     Active member of club or organization: None     Attends meetings of clubs or organizations: None     Relationship status: None    Intimate partner violence: Fear of current or ex partner: None     Emotionally abused: None     Physically abused: None     Forced sexual activity: None   Other Topics Concern    None   Social History Narrative    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     54 years    History children including 2 daughters and one son    [address] [employer] [facility]    He enjoys fishing pain and wood carving    Physically sedentary at this time    No prior history of tobacco or alcohol consumption or substance abuse. SCREENINGS    [place] Coma Scale  Eye Opening: Spontaneous  Best Verbal Response: Oriented  Best Motor Response: Obeys commands  [place] Coma Scale Score: 15        PHYSICAL EXAM    (up to 7 for level 4, 8 or more for level 5)     ED Triage Vitals [04/14/19 0305]   BP Temp Temp Source Pulse Resp SpO2 Height Weight   (!) 105/52 98.9 °F (37.2 °C) Oral 69 20 94 % 5' 8\" (1.727 m) 265 lb (120.2 kg)       Physical Exam   Constitutional: He is oriented to person, place, and time. He appears well-developed. No distress. HENT:   Head: Normocephalic and atraumatic. Eyes: Pupils are equal, round, and reactive to light. No scleral icterus. Neck: Normal range of motion. Neck supple. No JVD present. Cardiovascular: Normal rate, regular rhythm, normal heart sounds and intact distal pulses. Pulmonary/Chest: Effort normal and breath sounds normal. No respiratory distress. Abdominal: Soft. He exhibits no distension. There is no tenderness. Musculoskeletal: He exhibits edema (marked edema to the entire right lower extremity. There is an ecchymosis on the posterior aspect of thigh) and tenderness (Involving entire right lower extremity.). He exhibits no deformity. Right foot: There is normal capillary refill, no crepitus and no deformity. Swelling involving the entire right lower extremity. Mild tenderness associated with entire leg.  Neurovascular intact distally. Neurological: He is alert and oriented to person, place, and time. Skin: Skin is warm and dry. Psychiatric: He has a normal mood and affect. His behavior is normal.   Vitals reviewed. DIAGNOSTIC RESULTS     RADIOLOGY:   Non-plain film images such as CT, Ultrasound and MRI are read by the radiologist. Kay Dawson images are visualized and preliminarily interpreted by the emergency physician with the below findings:    Interpretation per the Radiologist below, if available at the time of this note:    CTA LOWER EXTREMITY RIGHT [address]    (Results Pending)     CTA EXTREMITY RIGHT LOWER:    No right lower extremity arterial stenosis or occlusion. Soft tissue swelling throughout the extremity. Multiple hematomas in the posterior compartment of the thigh measure up to 9 cm . Radiologist: Ariana Perez MD           LABS:  Labs Reviewed   CBC - Abnormal; Notable for the following components:       Result Value    RBC 2.13 (*)     Hemoglobin 7.6 (*)     Hematocrit 24.0 (*)     .7 (*)     MCH 35.7 (*)     MCHC 31.7 (*)     RDW 17.6 (*)     MPV 9.1 (*)     All other components within normal limits   BASIC METABOLIC PANEL - Abnormal; Notable for the following components:    Glucose 125 (*)     Calcium 8.0 (*)     All other components within normal limits   PROTIME-INR - Abnormal; Notable for the following components:    Protime 29.4 (*)     INR 2.88 (*)     All other components within normal limits   APTT - Abnormal; Notable for the following components:    aPTT 99.4 (*)     All other components within normal limits    Narrative:     [facility]  [facility] ,  Coag results called to and read back by PAIGE Javed [address] ED, 04/14/2019  04:31, by [facility]   TYPE AND SCREEN   PREPARE FRESH FROZEN PLASMA       All other labs were within normal range or not returned as of this dictation.     EMERGENCY DEPARTMENT COURSE and DIFFERENTIALDIAGNOSIS/MDM:   Vitals:    Vitals:    04/14/19 0530 04/14/19 0534 04/14/19 0545 04/14/19 0606   BP: (!) 138/59 (!) 138/59 118/64 (!) 120/59   Pulse: 65 63 62 62   Resp: 22 21 18 15   Temp: 98.2 °F (36.8 °C) 98.2 °F (36.8 °C) 98.2 °F (36.8 °C)    TempSrc:       SpO2:  95% 96% 95%   Weight:       Height:           MDM  Leg is significantly swollen but compartments are soft. Compartments will need to be watched closely. Exam is not consistent with cellulitis. I suspect he is probably having some oozing of blood somewhere in the thigh. Likely in the areas of hematoma seen on CT. Case was discussed with Dr. Andrew Guerrero who will see in consult. Recommended protamine, vitamin K and FFP. I have ordered these per his request. He'll see in consult. He reccommended consulting ortho as well for close monitoring of compartments. I spoke with Dr. Nivia Thorpe, ortho, who will see in consult and is agreeable plan of care. He is agreeable with plan of care. Also spoke with Dr. Yonis Garcias, hematology oncology, who will follow in consult as well. Case discussed with hospitalist, Dr. Kimberly Ruiz, who is agreeable plan of care and admission. CONSULTS:  IP CONSULT TO VASCULAR SURGERY  IP CONSULT TO ORTHOPEDIC SURGERY  IP CONSULT TO HEM/ONC    PROCEDURES:  Unless otherwise notedbelow, none     Procedures    FINAL IMPRESSION     1. Anemia, unspecified type    2. Hematoma of right thigh, initial encounter    3.  Leg swelling          DISPOSITION/PLAN   DISPOSITION        PATIENT REFERRED TO:  @FUP@    DISCHARGE MEDICATIONS:  New Prescriptions    No medications on file          (Please note that portions of this note were completed with a voice recognition program.  Efforts were made to edit the dictations butoccasionally words are mis-transcribed.)    Anay Tristan MD (electronically signed)  AttendingEmergency Physician        Anay Tristan MD  04/14/19 3221

## 2019-04-14 NOTE — H&P
ADMITTED:  [id number]    PCP  Sahara Peña MD    CODE STATUS: Full Code  HEALTH CARE PROXY: Mrs. Karan Rolon, his wife, [phone]      SUBJECTIVE:    Chief Complaint   Patient presents with    Leg Pain     right leg pain and swelling; pt has known blood clots       HPI:  He states that he began to have the leg swell about a month ago. He stats that it stabilized for three weeks and then began to grow again a week ago. He states chantelle the leg feels firm and is painful. The leg feels heavy. He states he has trouble moving. He has not noticed any bleeding. He has not had any blood in any fluids or discharges. , He states that there is nothing else that they wish to tell us.      ROS:  No fever, no chills, no night sweats, no new weakness, more fatigued than baseline, has malaise, no chest pain, no dyspnea, no new confusion, no palpitations, no dairrhea, no constipation, no dysuria      Past Medical History:   Diagnosis Date    Anxiety and depression 12/11/2017    B12 deficiency     CAD (coronary artery disease)     sees dr. combs    Cervical radiculopathy     Chronic bilateral low back pain without sciatica 3/20/2019    Diverticular disease     [facility] ([facility]     Erectile dysfunction     GERD (gastroesophageal reflux disease)     Hyperlipidemia     Hypertension     Morbid obesity ([address]     OCD (obsessive compulsive disorder)     ANDREW (obstructive sleep apnea)     CPAP 12    Paroxysmal atrial fibrillation ([address]     Unstable angina pectoris ([address]      Past Psychiatric History:  As per above    Past Surgical History:   Procedure Laterality Date    BACK SURGERY      total x 5 : lower 1983, lower 1997, upper 1999, [id number], [facility]  2001 and 2004    stents    CARPAL TUNNEL RELEASE Left     1985    CATARACT REMOVAL Bilateral 2005    L in Dec, R in Oct    CORONARY ANGIOPLASTY WITH STENT PLACEMENT      12/07/2004,  [facility], [place], [id number]    CORONARY ARTERY BYPASS GRAFT  2011    [place], [place]    GASTRIC BYPASS SURGERY  2002    HERNIA REPAIR      may 2005    IMPLANTATION VAGAL NERVE STIMULATOR N/A 3/20/2019    SPINAL CORD STIMULATOR PERMANENT PLACEMENT performed by Cheyanne Rose DO at [address]      left total knee    KNEE SURGERY Left 1969    meniscus repair in [address] in   [address] [facility]      2006   [address] and     lumbar x 2   [address]    neck ?  TONSILLECTOMY      1970    TUMOR REMOVAL Right 1973    foot    VENA CAVA FILTER PLACEMENT      [id number]     Social History     Tobacco History     Smoking Status  Never Smoker    Smokeless Tobacco Use  Never Used          Alcohol History     Alcohol Use Status  Yes Drinks/Week  1 Cans of beer per week Amount  0.6 oz alcohol/wk Comment  has 1 beer a monh with a piece of pizza          Drug Use     Drug Use Status  No          Sexual Activity     Sexually Active  Not Asked Comment  Mrs. Antoine Abdi            CODE STATUS: Full Code  HEALTH CARE PROXY: Mrs. Antoine Abdi, his wife, [phone]  AMBULATES: with a 3-point cane  DOMICILED: lives in a private home, has 2 steps in to the home, has 19 steps inside the home, lives alone with his wife    Family History   Problem Relation Age of Onset    Cancer Mother         of the lymph nodes in her neck    Cancer Father         lung cancer    Other Father         4-5 PPD smokes    Obesity Sister     Alcohol Abuse Sister     Other Sister         tobacco abuse    Heart Disease Brother     Other Brother         smoker    Other Brother          in accident at age 48 years    Alcohol Abuse Brother     Liver Disease Brother     Heart Disease Brother     No Known Problems Sister     Other Son         blood disorder - undiagnosed    Kidney Disease Daughter     Other Daughter         blood disorder - Protein S Deficiency    Hemochromatosis Daughter      Allergies   Allergen Reactions    deviation present. Cardiovascular: Normal rate and regular rhythm. Exam reveals no gallop and no friction rub. No murmur heard. Pulmonary/Chest: Effort normal. No stridor. No respiratory distress. He has no wheezes. He has no rales. He exhibits no tenderness. Abdominal: Soft. Bowel sounds are normal. He exhibits no distension. There is no tenderness. There is no rebound and no guarding. Musculoskeletal: He exhibits tenderness. He exhibits no deformity. Neurological: He is alert. Skin: Skin is warm. He is not diaphoretic. Psychiatric: He has a normal mood and affect. His behavior is normal.   right lateral thigh is firmer than opposite, the righ distal leg from knee down is likewise firmer than opposite, the areas are not hard, they are arm, pulses are present    LABS:  Results for Larry Radford (MRN [id number]) as of 4/14/2019 07:04   Ref.  Range 4/14/2019 03:28   Sodium Latest Ref Range: 136 - 145 mmol/L 136   Potassium Latest Ref Range: 3.5 - 5.0 mmol/L 4.2   Chloride Latest Ref Range: 98 - 111 mmol/L 104   CO2 Latest Ref Range: 22 - 29 mmol/L 24   BUN Latest Ref Range: 8 - 23 mg/dL 16   Creatinine Latest Ref Range: 0.5 - 1.2 mg/dL 0.9   Anion Gap Latest Ref Range: 7 - 19 mmol/L 8   GFR Non- Latest Ref Range: >60  >60   Glucose Latest Ref Range: 74 - 109 mg/dL 125 (H)   Calcium Latest Ref Range: 8.8 - 10.2 mg/dL 8.0 (L)   WBC Latest Ref Range: 4.8 - 10.8 K/uL 7.1   RBC Latest Ref Range: 4.70 - 6.10 M/uL 2.13 (L)   Hemoglobin Quant Latest Ref Range: 14.0 - 18.0 g/dL 7.6 (L)   Hematocrit Latest Ref Range: 42.0 - 52.0 % 24.0 (L)   MCV Latest Ref Range: 80.0 - 94.0 fL 112.7 (H)   MCH Latest Ref Range: 27.0 - 31.0 pg 35.7 (H)   MCHC Latest Ref Range: 33.0 - 37.0 g/dL 31.7 (L)   MPV Latest Ref Range: 9.4 - 12.4 fL 9.1 (L)   RDW Latest Ref Range: 11.5 - 14.5 % 17.6 (H)   Platelet Count Latest Ref Range: 130 - 400 K/uL 163   Prothrombin Time Latest Ref Range: 12.0 - 14.6 sec 29.4 (H)   INR Latest Ref Range: 0.88 - 1.18  2.88 (H)   aPTT Latest Ref Range: 26.0 - 36.2 sec 99.4 (HH)   ABO Rh Unknown A NEG   Antibody Screen Unknown NEG   PREPARE FRESH FROZEN PLASMA Unknown Rpt   Unit Number Unknown [id number]     DIAGNOSTICS:  CTA Lower Extremity  Pending oficial read    ASESSMENTS & PLANS:    Hematoma of leg  Vascular and orthopedic Sx on case  No etrvasation of dye in leg  Has hematoma  Good pulses still  Holding all anticoagulation and antiplatelet agents  Would consider PT when cleared for same by Sx    Continue other home meds, sub as needed      Patient Active Problem List   Diagnosis    Osteoarthritis of lumbar spine    B12 deficiency    Obstructive sleep apnea on CPAP    Memory loss    Anxiety and depression    Abnormal stress test    Coronary artery disease    Hx of CABG    Hypertension    H/O heart artery stent    Chronic anticoagulation    Chronic bilateral low back pain without sciatica    Atrial fibrillation with RVR (HCC)    Avulsion of right hamstring muscle    Chronic blood loss anemia    Morbid obesity (HCC)    Hematoma of leg, unspecified laterality, initial encounter

## 2019-04-14 NOTE — PROGRESS NOTES
4 Eyes Skin Assessment    Masood Doan is being assessed upon: Admission    I agree that I, Phyllis Pereyra, along with Kurtis Kirkland RN (either 2 RN's or 1 LPN and 1 RN) have performed a thorough Head to Toe Skin Assessment on the patient. ALL assessment sites listed below have been assessed. Areas assessed by both nurses:     [x]   Head, Face, and Ears   [x]   Shoulders, Back, and Chest  [x]   Arms, Elbows, and Hands   [x]   Coccyx, Sacrum, and Ischium  [x]   Legs, Feet, and Heels    Does the Patient have Skin Breakdown?  Yes    Small, healing incisions (surgery 3 weeks ago), lower back  Extensive bruising, scattered  Redness, bruising, RLE    Mat Prevention initiated: Yes  Wound Care Orders initiated: No    [facility] nurse consulted for Pressure Injury (Stage 3,4, Unstageable, DTI, NWPT, and Complex wounds) and New or Established Ostomies: No        Primary Nurse eSignature: Phyllis Pereyra RN on 4/14/2019 at 8:36 AM      Co-Signer eSignature: Electronically signed by Kurtis Kirkland RN on 4/14/19 at 7:01 PM

## 2019-04-15 PROBLEM — I48.0 PAROXYSMAL ATRIAL FIBRILLATION (HCC): Chronic | Status: ACTIVE | Noted: 2019-04-15

## 2019-04-15 LAB
ANION GAP SERPL CALCULATED.3IONS-SCNC: 9 MMOL/L (ref 7–19)
APTT: 59 SEC (ref 26–36.2)
BASOPHILS ABSOLUTE: 0 K/UL (ref 0–0.2)
BASOPHILS RELATIVE PERCENT: 0.3 % (ref 0–1)
BUN BLDV-MCNC: 17 MG/DL (ref 8–23)
CALCIUM SERPL-MCNC: 8.1 MG/DL (ref 8.8–10.2)
CHLORIDE BLD-SCNC: 108 MMOL/L (ref 98–111)
CO2: 26 MMOL/L (ref 22–29)
CREAT SERPL-MCNC: 0.7 MG/DL (ref 0.5–1.2)
EOSINOPHILS ABSOLUTE: 0.1 K/UL (ref 0–0.6)
EOSINOPHILS RELATIVE PERCENT: 1.6 % (ref 0–5)
GFR NON-AFRICAN AMERICAN: >60
GLUCOSE BLD-MCNC: 104 MG/DL (ref 74–109)
HCT VFR BLD CALC: 21.8 % (ref 42–52)
HEMOGLOBIN: 6.7 G/DL (ref 14–18)
INR BLD: 2.03 (ref 0.88–1.18)
LYMPHOCYTES ABSOLUTE: 1.4 K/UL (ref 1.1–4.5)
LYMPHOCYTES RELATIVE PERCENT: 18.8 % (ref 20–40)
MCH RBC QN AUTO: 34.4 PG (ref 27–31)
MCHC RBC AUTO-ENTMCNC: 30.7 G/DL (ref 33–37)
MCV RBC AUTO: 111.8 FL (ref 80–94)
MONOCYTES ABSOLUTE: 1.1 K/UL (ref 0–0.9)
MONOCYTES RELATIVE PERCENT: 14 % (ref 0–10)
NEUTROPHILS ABSOLUTE: 4.9 K/UL (ref 1.5–7.5)
NEUTROPHILS RELATIVE PERCENT: 64.6 % (ref 50–65)
PDW BLD-RTO: 18.2 % (ref 11.5–14.5)
PLATELET # BLD: 150 K/UL (ref 130–400)
PMV BLD AUTO: 9.4 FL (ref 9.4–12.4)
POTASSIUM REFLEX MAGNESIUM: 4 MMOL/L (ref 3.5–5)
PROTHROMBIN TIME: 22.2 SEC (ref 12–14.6)
RBC # BLD: 1.95 M/UL (ref 4.7–6.1)
SODIUM BLD-SCNC: 143 MMOL/L (ref 136–145)
WBC # BLD: 7.5 K/UL (ref 4.8–10.8)

## 2019-04-15 PROCEDURE — 80048 BASIC METABOLIC PNL TOTAL CA: CPT

## 2019-04-15 PROCEDURE — 85025 COMPLETE CBC W/AUTO DIFF WBC: CPT

## 2019-04-15 PROCEDURE — 85730 THROMBOPLASTIN TIME PARTIAL: CPT

## 2019-04-15 PROCEDURE — 36415 COLL VENOUS BLD VENIPUNCTURE: CPT

## 2019-04-15 PROCEDURE — 2580000003 HC RX 258: Performed by: HOSPITALIST

## 2019-04-15 PROCEDURE — 36430 TRANSFUSION BLD/BLD COMPNT: CPT

## 2019-04-15 PROCEDURE — 99232 SBSQ HOSP IP/OBS MODERATE 35: CPT | Performed by: PHYSICIAN ASSISTANT

## 2019-04-15 PROCEDURE — 6360000002 HC RX W HCPCS: Performed by: NURSE PRACTITIONER

## 2019-04-15 PROCEDURE — 1210000000 HC MED SURG R&B

## 2019-04-15 PROCEDURE — 85610 PROTHROMBIN TIME: CPT

## 2019-04-15 PROCEDURE — 6370000000 HC RX 637 (ALT 250 FOR IP): Performed by: INTERNAL MEDICINE

## 2019-04-15 PROCEDURE — 2700000000 HC OXYGEN THERAPY PER DAY

## 2019-04-15 PROCEDURE — 86923 COMPATIBILITY TEST ELECTRIC: CPT

## 2019-04-15 PROCEDURE — 6370000000 HC RX 637 (ALT 250 FOR IP): Performed by: HOSPITALIST

## 2019-04-15 PROCEDURE — P9016 RBC LEUKOCYTES REDUCED: HCPCS

## 2019-04-15 RX ADMIN — ROSUVASTATIN CALCIUM 10 MG: 10 TABLET, FILM COATED ORAL at 19:43

## 2019-04-15 RX ADMIN — OXYCODONE HYDROCHLORIDE AND ACETAMINOPHEN 1 TABLET: 5; 325 TABLET ORAL at 13:47

## 2019-04-15 RX ADMIN — CYANOCOBALAMIN TAB 500 MCG 1000 MCG: 500 TAB at 08:58

## 2019-04-15 RX ADMIN — FLUOXETINE 60 MG: 20 CAPSULE ORAL at 08:58

## 2019-04-15 RX ADMIN — METOPROLOL SUCCINATE 25 MG: 25 TABLET, EXTENDED RELEASE ORAL at 08:58

## 2019-04-15 RX ADMIN — OXYCODONE HYDROCHLORIDE AND ACETAMINOPHEN 1 TABLET: 5; 325 TABLET ORAL at 09:02

## 2019-04-15 RX ADMIN — FAMOTIDINE 20 MG: 20 TABLET ORAL at 19:43

## 2019-04-15 RX ADMIN — PREGABALIN 100 MG: 50 CAPSULE ORAL at 08:58

## 2019-04-15 RX ADMIN — Medication 10 ML: at 08:59

## 2019-04-15 RX ADMIN — Medication 10 ML: at 19:42

## 2019-04-15 RX ADMIN — Medication 1 TABLET: at 08:58

## 2019-04-15 RX ADMIN — OXYCODONE HYDROCHLORIDE AND ACETAMINOPHEN 1 TABLET: 5; 325 TABLET ORAL at 22:07

## 2019-04-15 RX ADMIN — PREGABALIN 100 MG: 50 CAPSULE ORAL at 19:43

## 2019-04-15 RX ADMIN — FAMOTIDINE 20 MG: 20 TABLET ORAL at 08:58

## 2019-04-15 RX ADMIN — HYDROMORPHONE HYDROCHLORIDE 1 MG: 1 INJECTION, SOLUTION INTRAMUSCULAR; INTRAVENOUS; SUBCUTANEOUS at 07:56

## 2019-04-15 RX ADMIN — OXYCODONE HYDROCHLORIDE AND ACETAMINOPHEN 1 TABLET: 5; 325 TABLET ORAL at 18:09

## 2019-04-15 ASSESSMENT — ENCOUNTER SYMPTOMS
COUGH: 0
DIARRHEA: 0
SORE THROAT: 0
VOMITING: 0
NAUSEA: 0
BACK PAIN: 0
EYE REDNESS: 0
EYE DISCHARGE: 0
EYES NEGATIVE: 1
RESPIRATORY NEGATIVE: 1
SHORTNESS OF BREATH: 0
CONSTIPATION: 0
WHEEZING: 0
BLOOD IN STOOL: 0
GASTROINTESTINAL NEGATIVE: 1
ABDOMINAL PAIN: 0
EYE PAIN: 0

## 2019-04-15 ASSESSMENT — PAIN DESCRIPTION - PAIN TYPE
TYPE: ACUTE PAIN

## 2019-04-15 ASSESSMENT — PAIN SCALES - GENERAL
PAINLEVEL_OUTOF10: 8
PAINLEVEL_OUTOF10: 8
PAINLEVEL_OUTOF10: 6
PAINLEVEL_OUTOF10: 7
PAINLEVEL_OUTOF10: 2
PAINLEVEL_OUTOF10: 1
PAINLEVEL_OUTOF10: 10
PAINLEVEL_OUTOF10: 1
PAINLEVEL_OUTOF10: 10
PAINLEVEL_OUTOF10: 0
PAINLEVEL_OUTOF10: 6
PAINLEVEL_OUTOF10: 0
PAINLEVEL_OUTOF10: 2
PAINLEVEL_OUTOF10: 7

## 2019-04-15 ASSESSMENT — PAIN DESCRIPTION - LOCATION
LOCATION: LEG

## 2019-04-15 ASSESSMENT — PAIN DESCRIPTION - ORIENTATION
ORIENTATION: RIGHT

## 2019-04-15 NOTE — PROGRESS NOTES
RLE measures 17.5 inches. Will continue to monitor.  Electronically signed by Roni Neff RN on 4/15/2019 at 1:53 PM

## 2019-04-15 NOTE — PROGRESS NOTES
Patient's wife at bedside, complains that patient is unable to speak. Patient is alert, but gave a delayed answer when asked questions. Due to his severe pain in RLE and headache, wife's concern, delayed response (change in condition), PCA asked to get vitals and rapid response called.

## 2019-04-15 NOTE — PROGRESS NOTES
[facility] (formerly [facility]ists    Patient:  Blue Barrera  YOB: 1945  Date of Service: 4/15/2019  MRN: [id number]   Acct: [de-identified]   Primary Care Physician: Juan Jay MD  Advance Directive: Full Code  Admit Date: 4/14/2019       Hospital Day: 1    CHIEF COMPLAINT RLE pain    SUBJECTIVE: Mr. Chester Richards continues to complain of RLE. Describes it as constant, severe, worse with palpation and movement, better with rest and analgesic use. Review of Systems:   14 point review of systems is negative except as specifically addressed above. Objective:   VITALS:  /63   Pulse 64   Temp 97.5 °F (36.4 °C) (Temporal)   Resp 18   Ht 5' 8\" (1.727 m)   Wt 265 lb (120.2 kg)   SpO2 100%   BMI 40.29 kg/m²   24HR INTAKE/OUTPUT:      Intake/Output Summary (Last 24 hours) at 4/15/2019 1449  Last data filed at 4/15/2019 1250  Gross per 24 hour   Intake 900 ml   Output 1204 ml   Net -304 ml     General appearance: alert, appears stated age, cooperative and no distress  Head: Normocephalic, without obvious abnormality, atraumatic  Eyes: conjunctivae/corneas clear. PERRL, EOM's intact. Ears: normal external ears and nose, throat without exudate  Neck: no adenopathy, no carotid bruit, no JVD, supple, symmetrical, trachea midline and thyroid not enlarged, symmetric, no tenderness/mass/nodules  Lungs: diminished throughout otherwise clear to auscultation bilaterally,no rales or wheezes   Heart: regular rate and rhythm, S1, S2 normal, no murmur  Abdomen:soft, non-tender; non-distended, normal bowel sounds no masses, no organomegaly  Extremities:Diffuse edema, tenderness RLE, Right calf measuring 17.5\", scattered ecchymotic areas  Skin: Skin color, texture, turgor normal. No rashes or lesions  Lymphatic: No palpable lymph node enlargment  Neurologic: Alert and oriented X 3, normal strength and tone.  No focal deficits  Psychiatric: Alert and oriented, thought content appropriate, normal insight, mood appropriate    Medications:      sodium chloride flush  10 mL Intravenous 2 times per day    famotidine  20 mg Oral BID    FLUoxetine  60 mg Oral Daily    metoprolol succinate  25 mg Oral Daily    pregabalin  100 mg Oral BID    rosuvastatin  10 mg Oral Nightly    vitamin B-12  1,000 mcg Oral Daily    calcium-cholecalciferol  1 tablet Oral Daily     sodium chloride flush, ondansetron, potassium chloride **OR** potassium alternative oral replacement **OR** potassium chloride, magnesium sulfate, polyethylene glycol, cetirizine, nitroGLYCERIN, oxyCODONE-acetaminophen  DIET CARDIAC;     Lab and other Data:     Recent Labs     04/14/19 0328 04/14/19  1805 04/15/19  0436   WBC 7.1 7.1 7.5   HGB 7.6* 7.5* 6.7*    155 150     Recent Labs     04/14/19  0328 04/15/19  0436    143   K 4.2 4.0    108   CO2 24 26   BUN 16 17   CREATININE 0.9 0.7   GLUCOSE 125* 104     INR:   Recent Labs     04/14/19  0328 04/15/19  0436   INR 2.88* 2.03*      RAD:   Cta Lower Extremity Right W Wo Contrast  Addendum Date: 4/14/2019    Addendum: There are typographic errors in the initial report: Impression should state:    Result Date: 4/14/2019  ORIGINAL REPORT  Exam:   CTA LOWER EXTREMITY RIGHT W WO CONTRAST  Date:  4/14/2019 History:  Male, age  72 years; leg swelling COMPARISON:  None. Findings : Vascular findings: The right common iliac and extra iliac arteries demonstrate atherosclerotic disease, without flow-limiting stenosis. The right common femoral, superficial femoral, the profunda, popliteal arteries demonstrate no flow-limiting stenosis or occlusion. There is appropriate trifurcation of the popliteal artery with mild stenosis. No occlusion identified. Nonvascular findings: There is edema circumferentially involving the right lower extremity from the acetabulum to the foot.  Additionally, there are organized fluid collections, without enhancement identified in the posterior aspect of the right lower extremity, favored to be hematomas. Superiorly there is a fluid collection measuring 14 x 4 x 5.5 cm. More inferiorly and still in the posterior compartment an additional discrete fluid collection measuring 13 x 5.5 x 8.5 cm. An additional adjacent inferior fluid collection measuring 2.2 x 2.9 x 2.1 cm is noted. There is no acute osseous pathology. Impression: 1. No occlusion or high-grade stenosis in the left humerus fixation of the lumbar extremity. 2. 3 discrete hematoma above the calf as described above. Signed by Dr Tal Ham on 4/14/2019 9:38 AM ADDENDUM #1  Impression: 1. No occlusion or high-grade stenosis in the  right lower extremity. 2. 3 discrete hematomas above the calf as described above. Signed by Dr Tal Ham on 4/14/2019 12:04 PM    Assessment/Plan   Principal Problem:    Hematoma of leg, unspecified laterality, initial encounter  Active Problems:    Coronary artery disease    Hx of CABG    Chronic anticoagulation    Osteoarthritis of lumbar spine    Obstructive sleep apnea on CPAP    Avulsion of right hamstring muscle    Chronic blood loss anemia    Morbid obesity (HCC)    Paroxysmal atrial fibrillation ([facility] [address]  Resolved Problems:    * No resolved hospital problems. *     Lovenox/Coumadin held, RLE still extremely painful and edematous. Ortho following. Continue frequent measurements, analgesics and elevation of RLE. PRBC transfused per Hematology. Continue to monitor hemoglobin. Potassium replaced. Mag WNL. Monitor closely, further orders per clinical course.     GI prophylaxis:  Pepcid    Cayla Jeffery PA-C

## 2019-04-15 NOTE — PROGRESS NOTES
Patient name: Jeovanny Laboy  Patient : 1945  4/15/2019  7:21 AM  ROOM 309    Patient Active Problem List   Diagnosis Code    Osteoarthritis of lumbar spine M47.816    B12 deficiency E53.8    Obstructive sleep apnea on CPAP G47.33, Z99.89    Memory loss R41.3    Anxiety and depression F41.9, F32.9    Abnormal stress test R94.39    Coronary artery disease I25.10    Hx of CABG Z95.1    Hypertension I10    H/O heart artery stent Z95.5    Chronic anticoagulation Z79.01    Chronic bilateral low back pain without sciatica M54.5, G89.29    Atrial fibrillation with RVR ([facility]) I48.91    Avulsion of right hamstring muscle S76.391A    Chronic blood loss anemia D50.0    Morbid obesity ([facility]) E66.01    Hematoma of leg, unspecified laterality, initial encounter S80.10XA       Subjective: C/O Pain 12/10 to RLE that has advanced to the right groin. Hgb drop to 6.7. HISTORY OF PRESENT ILLNESS:   Mr. Sandy Rose was seen in initial consultation on 3/30/2019 during hospitalization at [facility] admitted with right lower extremity thigh pain with decreased mobility with chronic deep vein thrombosis involving the distal femoral and chronic superficial thrombophlebitis in short saphous vein as documented on 3/28/2019 study.      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  He has a chronic indwelling [place] filter in place.      Helen Powers had had a pain/stimulator placed by Dr. Gretel Torres on 3/20/2019 for chronic back and bilateral leg pain.  For the procedure he was placed on Lovenox. Warfarin was resumed on 3/28/2019 with an INR of 2.6.      His coagulation laboratory parameters are being checked in [facility].     Venous duplex of RLE on 3/28/2019- Chronic deep vein thrombosis involving the distal femoral and chronic superficial thrombophlebitis in short saphous vein     CT of right femur on 3/29/2019- Severe right hamstring tendinosis with ischiogluteal bursitis. Nonspecific elongated fluid collection in the central right thigh. Intact right femur without fracture or dislocation of the hip or knee joints.     Medical records Dr. Fabio Perez 4/14/2014:  Dr. Marlo Jim's consultation note from 4/14/2014 documented that Claudio Jauregui had a strong family history of clotting disorders to include his sister developed a DVT at the age of 12, his son developed DVT at the age of 12 and a daughter developed DVT and PE while on birth control with a low protein S.     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     Claudio Jauregui is readmitted 4/14/19 for significant RLE swelling. Yesterday, 4/13/2019, he sat out in the yard with his grandson working on flowers. He states that he sat in a folding lawn chair, the typical kind with the bar across the front, somewhat reclining, that puts pressure right above the knee posteriorly, the precise area of his newly developed hematoma. He states that the symptoms of the right lower extremity began yesterday afternoon after being out in the yard in the lawn chair.     - CTA RLE 4/14/19 there is no evidence of right arterial occlusion or high-grade stenosis Three discrete hematomas above the calf, measuring (1) 14 x 4 x 5.5 cm  (2) 13 x 5.5 x 8.5 cm  (3) 2.2 x 2.9 x 2.1 cm. No occlusion or high-grade stenosis in the  right lower extremity. Review of Systems   Constitutional: Positive for fatigue. Negative for chills, diaphoresis and fever. HENT: Negative. Negative for congestion, ear pain, hearing loss, nosebleeds, sore throat and tinnitus. Eyes: Negative. Negative for pain, discharge and redness. Respiratory: Negative. Negative for cough, shortness of breath and wheezing. Cardiovascular: Negative. Negative for chest pain, palpitations and leg swelling. Gastrointestinal: Negative. Negative for abdominal pain, blood in stool, constipation, diarrhea, nausea and vomiting. Endocrine: Negative for polydipsia. Genitourinary: Negative for dysuria, flank pain, frequency, hematuria and urgency. Musculoskeletal: Negative. Negative for back pain, myalgias and neck pain. RLE edema and hematomas   Skin: Negative. Negative for rash. Neurological: Positive for weakness. Negative for dizziness, tremors, seizures and headaches. Hematological: Does not bruise/bleed easily. Psychiatric/Behavioral: Negative. The patient is not nervous/anxious. Current Pain Assessment  Pain Assessment  Pain Assessment: FLACC  Pain Level: 0  Pain Type: Acute pain  Pain Location: Leg  Pain Orientation: Right  Response to Pain Intervention: Asleep with RR greater than 10    OBJECTIVE:  VITALS: BP (!) 115/58   Pulse 57   Temp 96.3 °F (35.7 °C) (Temporal)   Resp 16   Ht 5' 8\" (1.727 m)   Wt 265 lb (120.2 kg)   SpO2 95%   BMI 40.29 kg/m²   I&O:     Intake/Output Summary (Last 24 hours) at 4/15/2019 0721  Last data filed at 4/15/2019 0322  Gross per 24 hour   Intake 1140 ml   Output 804 ml   Net 336 ml         Physical Exam   Constitutional: He is oriented to person, place, and time. He appears well-developed and well-nourished. No distress. HENT:   Head: Normocephalic and atraumatic. Mouth/Throat: Oropharynx is clear and moist.   Eyes: Conjunctivae are normal. Right eye exhibits no discharge. Left eye exhibits no discharge. No scleral icterus. Neck: Normal range of motion. Neck supple. No JVD present. No tracheal deviation present. Cardiovascular: Normal rate, regular rhythm, normal heart sounds and intact distal pulses. Exam reveals no gallop and no friction rub.    No murmur heard.  Pulmonary/Chest: Effort normal and breath sounds normal. No respiratory distress. He has no wheezes. He has no rales. He exhibits no tenderness. Abdominal: Soft. Bowel sounds are normal. He exhibits no distension and no mass. There is no tenderness. There is no rebound and no guarding. No hernia. Musculoskeletal: He exhibits edema. He exhibits no tenderness or deformity. RLE edema and hematomas   Neurological: He is alert and oriented to person, place, and time. No cranial nerve deficit. Coordination normal.   Skin: Skin is warm. No rash noted. He is not diaphoretic. No erythema. No pallor. Psychiatric: He has a normal mood and affect. His behavior is normal. Thought content normal.   Nursing note and vitals reviewed. BMP:   Recent Labs     04/14/19  0328 04/15/19  0436    143   K 4.2 4.0    108   CO2 24 26   BUN 16 17   CREATININE 0.9 0.7   GLUCOSE 125* 104   CALCIUM 8.0* 8.1*     CBC:   Recent Labs     04/14/19  1805 04/15/19  0436   WBC 7.1 7.5   HGB 7.5* 6.7*   HCT 24.8* 21.8*   .7* 111.8*    150     CMP:    Recent Labs     04/14/19  0328 04/15/19  0436    143   K 4.2 4.0    108   CO2 24 26   BUN 16 17   CREATININE 0.9 0.7   LABGLOM >60 >60   GLUCOSE 125* 104   CALCIUM 8.0* 8.1*       30Day lookback of cultures:    Blood Culture Recent: No results for input(s): BC in the last 720 hours. Gram Stain Recent: No results for input(s): LABGRAM in the last 720 hours. Resp Culture Recent: No results for input(s): CULTRESP in the last 720 hours. Body Fluid Recent : No results for input(s): BFCX in the last 720 hours. MRSA Recent : No results for input(s): [address] in the last 720 hours. Urine Culture Recent : No results for input(s): LABURIN in the last 720 hours. Organism Recent : No results for input(s): ORG in the last 720 hours. Radiology:   Ct Abdomen Pelvis Wo Contrast Additional Contrast? None    Result Date: 3/29/2019  Examination.  CT ABDOMEN stimulator electrodes are seen in place. There is a mild dextroscoliosis. No focal bony abnormality or a bone lesion. There is evidence of upper abdominal midline fat-containing ventral hernia. There is no evidence of retroperitoneal hematoma. No acute abnormality of the abdomen or pelvis. No evidence of retroperitoneal hematoma. A very distended gallbladder without stones. This may be further evaluated with radionuclide hepatobiliary imaging. The appendix is normal. The diverticulosis of the distal colon. No evidence for diverticulitis. Postsurgical changes of the stomach. No focal complication. The above finding are recorded on a digital voice clip in PACS. Signed by Dr Vivienne Palomo on 3/29/2019 8:14 AM    Xr Lumbar Spine (2-3 Views)    Result Date: 3/20/2019  History: 70-year-old with spinal cord stimulator. Reference: None. FINDINGS: 2 intraoperative fluoroscopic digital store images at the thoracolumbar junction demonstrate dorsal column stimulator leads. Total images 2. Fluoroscopic time not available. Signed by Dr Burak Tobin on 3/20/2019 10:25 PM    Cta Lower Extremity Right W Wo Contrast    Addendum Date: 4/14/2019    Addendum: There are typographic errors in the initial report: Impression should state:    Result Date: 4/14/2019   ORIGINAL REPORT  Exam:   CTA LOWER EXTREMITY RIGHT W WO CONTRAST  Date:  4/14/2019 History:  Male, age  72 years; leg swelling COMPARISON:  None. Findings : Vascular findings: The right common iliac and extra iliac arteries demonstrate atherosclerotic disease, without flow-limiting stenosis. The right common femoral, superficial femoral, the profunda, popliteal arteries demonstrate no flow-limiting stenosis or occlusion. There is appropriate trifurcation of the popliteal artery with mild stenosis. No occlusion identified. Nonvascular findings: There is edema circumferentially involving the right lower extremity from the acetabulum to the foot.  Additionally, there are organized fluid collections, without enhancement identified in the posterior aspect of the right lower extremity, favored to be hematomas. Superiorly there is a fluid collection measuring 14 x 4 x 5.5 cm. More inferiorly and still in the posterior compartment an additional discrete fluid collection measuring 13 x 5.5 x 8.5 cm. An additional adjacent inferior fluid collection measuring 2.2 x 2.9 x 2.1 cm is noted. There is no acute osseous pathology. Impression: 1. No occlusion or high-grade stenosis in the left humerus fixation of the lumbar extremity. 2. 3 discrete hematoma above the calf as described above. Signed by Dr Azucena Cox on 4/14/2019 9:38 AM ADDENDUM #1  Impression: 1. No occlusion or high-grade stenosis in the  right lower extremity. 2. 3 discrete hematomas above the calf as described above. Signed by Dr Azucena Cox on 4/14/2019 12:04 PM    Cta Abdominal Aorta W Bilat Runoff W Wo Contrast    Result Date: 3/31/2019  Indication 72year-old with back pain. Technique Spiral imaging was performed through the abdomen, the pelvis, and both lower extremities during uneventful administration of intravenous contrast, with bolus optimized for arterial evaluation. 3-D MIP reformatted images were obtained. Automated exposure control was utilized to limit radiation dose. DLP 3620 mGy-cm Findings ABDOMEN/PELVIS Mild bronchiectasis in the lung bases with minimal atelectasis. Previous median sternotomy. The heart is enlarged. No pericardial effusion. No liver or splenic masses. Gallbladder is distended. Postoperative changes of the stomach. No adrenal mass. No obstructive uropathy. No acute abnormality of the bowel. Evidence of previous small bowel surgery. Colonic diverticulosis. Dorsal column stimulator lead. ABDOMINAL AORTA There is mild atherosclerosis of the abdominal aorta without aneurysm or dissection. Celiac axis and SMA are widely patent. No renal artery stenosis is seen.  The inferior mesenteric artery is patent. There is diffuse asymmetric swelling of the entire right leg. I suspect a hematoma in the posterior compartment right side. I cannot determine if this is an INTRAmuscular hematoma or an INTERmuscular hematoma. RIGHT LOWER EXTREMITY Mild atherosclerosis of the right common iliac artery which is tortuous proximally. No focal stenosis however. Right external iliac artery is widely patent with minimal plaque. Nonstenotic CFA. Mild diffuse calcific plaque of the SFA without stenosis. Popliteal artery is normal. There is trifurcation disease. Mild/moderate stenosis of the tibioperoneal trunk. Severe multifocal stenoses of the anterior tibial artery. Multifocal severe stenoses of the posterior tibial artery. There is difficulty evaluating the distal tibial vessels due to the calcific plaque. I do believe there is three-vessel runoff to the right foot however. LEFT LOWER EXTREMITY Mild atherosclerosis of the common iliac artery which is nonstenotic. Nonstenotic external iliac artery and CFA with mild atherosclerosis. Mild to moderate atherosclerosis of the distal SFA without significant luminal narrowing. Portions of the left popliteal artery is obscured by streak artifact from the left knee arthroplasty. Otherwise no popliteal artery stenosis is suspected. Trifurcation disease. Mild multifocal stenoses of the anterior/posterior tibial arteries and probably artery. There is three-vessel runoff at the ankle. 1. Asymmetric right leg swelling with hematoma in the posterior compartment right thigh. I suspect this may all be related to an acute hamstrings avulsion injury. There is a 2 cm ossific fragment in the mid/distal thigh, possibly the avulsed and retracted fragment. Confirmation with MRI may be obtained. 2. Trifurcation and tibial disease with three-vessel runoff at both ankles.  Signed by Dr Rohan Roth on 3/31/2019 10:10 AM    Ct Femur Right Wo Contrast    Addendum Date: 3/29/2019    Addendum: There is also superficial soft tissue swelling laterally and posteriorly at the level of the mid and distal femoral shaft, correlate for cellulitis. There is no superficial abscess identified. Signed by Dr Carol Shepard on 3/29/2019 8:48 AM    Result Date: 3/29/2019   ORIGINAL REPORT History: 22-year-old with swelling and induration. Reference: None. TECHNIQUE: Unenhanced CT imaging of the right femur. Automated exposure control was utilized to limit radiation dose. DLP 1459 mGy-cm Findings: The right femur is intact. No fracture. No bone lesion or periosteal reaction. No cortical destruction. No right hip fracture or dislocation. Very mild osteoarthritis of the right hip. Right superior and inferior pubic rami are intact. There is severe hamstrings insertional tendinosis with asymmetric soft tissue density at the site of insertion. There is nonspecific fluid collection centrally in the right thigh musculature between the medial abductor muscles and semitendinosis. This collection measures approximately 3.7 cm in transverse dimension by 11 cm in craniocaudal dimension by 1.7 cm in AP dimension. Vascular calcifications. 1. Intact right femur without fracture or dislocation of the hip or knee joints. 2. Nonspecific elongated fluid collection in the central right thigh. Consider muscular tear as cause but this is nonspecific. MRI is recommended. 3. Severe right hamstring tendinosis with ischiogluteal bursitis.  Signed by Dr Carol Shepard on 3/29/2019 8:43 AM ADDENDUM #1    Vl Extremity Venous Right    Result Date: 3/29/2019  Vascular Lower Extremities DVT Study Procedure  Demographics   Patient Name    Key Beltran Age                   72   Patient Number  [id number]           Gender                Male   Visit Number    [id number]        Interpreting          Haleigh Veloz MD                                   Physician   Date of Birth   1945       Referring Physician   [facility]   Accession       [id number]        [facility] +------------------------------------+----------+---------------+----------+ ! Popliteal                           !Yes       ! Yes            ! None      ! +------------------------------------+----------+---------------+----------+ ! SSV                                 ! Yes       ! No             !Chronic   ! +------------------------------------+----------+---------------+----------+ ! Gastroc                             ! Yes       ! Yes            ! None      ! +------------------------------------+----------+---------------+----------+ ! PTV                                 ! Yes       ! Yes            ! None      ! +------------------------------------+----------+---------------+----------+ ! GSV                                 ! Yes       ! Yes            ! None      ! +------------------------------------+----------+---------------+----------+ ! ATV                                 ! Yes       ! Yes            ! None      ! +------------------------------------+----------+---------------+----------+ ! Peroneal                            !Yes       ! Yes            ! None      ! +------------------------------------+----------+---------------+----------+      Medications  Current Facility-Administered Medications   Medication Dose Route Frequency Provider Last Rate Last Dose    sodium chloride flush 0.9 % injection 10 mL  10 mL Intravenous 2 times per day Tabitha Galeazzi, MD   10 mL at 04/14/19 2103    sodium chloride flush 0.9 % injection 10 mL  10 mL Intravenous PRN Tabitha Galeazzi, MD        ondansetron [facility]) injection 4 mg  4 mg Intravenous Q6H PRN Tabitha Galeazzi, MD        potassium chloride (KLOR-CON M) extended release tablet 40 mEq  40 mEq Oral PRN Tabitha Galeazzi, MD        Or    potassium bicarb-citric acid (EFFER-K) effervescent tablet 40 mEq  40 mEq Oral PRN Tabitha Galeazzi, MD        Or    potassium chloride 10 mEq/100 mL IVPB (Peripheral Line)  10 mEq Intravenous PRN Tabitha Galeazzi, MD        magnesium sulfate 1 g in dextrose 5% 100 mL IVPB  1 g Intravenous PRN Dasha Wolfe MD        polyethylene glycol [facility]) packet 17 g  17 g Oral Daily PRN Dasha Wolfe MD   17 g at 04/14/19 2239    cetirizine (ZYRTEC) tablet 10 mg  10 mg Oral Daily PRN Dasha Wolfe MD        famotidine (PEPCID) tablet 20 mg  20 mg Oral BID Dasha Wolfe MD   20 mg at 04/14/19 2103    FLUoxetine (PROZAC) capsule 60 mg  60 mg Oral Daily Dasha Wolfe MD   60 mg at 04/14/19 1859    metoprolol succinate (TOPROL XL) extended release tablet 25 mg  25 mg Oral Daily Dasha Wolfe MD   25 mg at 04/14/19 2630    nitroGLYCERIN (NITROSTAT) SL tablet 0.4 mg  0.4 mg Sublingual Q5 Min PRN Dasha Wolfe MD        pregabalin (LYRICA) capsule 100 mg  100 mg Oral BID Dasha Wolfe MD   100 mg at 04/14/19 2102    rosuvastatin (CRESTOR) tablet 10 mg  10 mg Oral Nightly Dasha Wolfe MD   10 mg at 04/14/19 2102    vitamin B-12 (CYANOCOBALAMIN) tablet 1,000 mcg  1,000 mcg Oral Daily Dasha Wolfe MD   1,000 mcg at 04/14/19 8460    calcium-cholecalciferol 500-200 MG-UNIT per tablet 1 tablet  1 tablet Oral Daily Dasha Wolfe MD   1 tablet at 04/14/19 1725    oxyCODONE-acetaminophen (PERCOCET) 5-325 MG per tablet 1 tablet  1 tablet Oral Q4H PRN Astrid Urias DO   1 tablet at 04/14/19 2111       Allergies  Iodine; Morphine; and Shellfish-derived products      ASSESSMENT/PLAN:  RLE pain/swelling with chronic deep vein thrombosis and superficial thrombophlebitis      - Venous duplex of RLE on 3/28/2019- Chronic deep vein thrombosis involving the distal femoral and chronic superficial thrombophlebitis in short saphous vein  - CT of right femur on 3/29/2019- Severe right hamstring tendinosis with ischiogluteal bursitis. Nonspecific elongated fluid collection in the central right thigh. Intact right femur without fracture or dislocation of the hip or knee joints.   - CTA RLE 4/14/19 there is no evidence of right arterial occlusion or high-grade stenosis Three discrete hematomas above the calf, measuring (1) 14 x 4 x 5.5 cm  (2) 13 x 5.5 x 8.5 cm  (3) 2.2 x 2.9 x 2.1 cm. No occlusion or high-grade stenosis in the  right lower extremity. - evaluated by ortho, Dr. Ibeth Guerrero who felt there were no immediate concerns for surgical intervention, however monitoring closely for compartment syndrome and need for fasciotomy  - followed by vascular surgery, Dr. Martínez Franco as well          GEE Gene Homozygous Mutation, chronic anticoagulation with Warfarin (managed by Dr. Santosh Kessler)   Gary [place] had a chronic indwelling IVC [place] filter     Admit coags on 4/14/2019 3:28 AM  -  PT/INR 29.4 / 2.88  -  PTT 99.4     -  Vitamin K 5 mg on 4/14/2019,  per Dr. Martínez Franco  -  1 unit FFP on 4/14/2019, per Dr. Martínez Franco   -  aPTT 59 today  -  PT/INR 22.2/2.03 today     Macrocytic anemia  -  HgB 6.7, .8- Transfuse 1 unit of pRBCs today      Serology 3/29/19:  ? Ferritin 108.5  ? Iron 87  ? Iron saturation 29%             ? TIBC 296  ? Folate 14.7  ? Vitamin B12 795  ? Reticulocyte count 0.1022  · Stool for occult blood negative on 4/3/2019    -  May need to consider bone marrow aspiration and biopsy in the future due to elevated MCV to R/O MDS.       C/O of severe pain to RLE, ordered 1 time dose of Dilaudid 1 mg IV  Ordered 1 unit of pRBCs for Hgb 6.7  Vascular and Ortho assisting and monitoring for compartment syndrome. Елена Marrero, SANTY    04/15/19  7:21 AM      I personally saw and examined this patient, performing a face-to-face diagnostic evaluation with plan of care reviewed and developed with Taj Mcnulty and nursing staff. I have addended and/or modified the above history of present illness, physical examination, and assessment and plan to reflect my findings and impressions. Essential elements of the care plan were discussed with Blanca BURT . Agree with findings and assessment/plan as documented above.    Questions were encouraged, asked and answered to their

## 2019-04-15 NOTE — PROGRESS NOTES
Cautery Type: electrodesiccation Right leg measures 18 inch.  Electronically signed by Roni Neff RN on 4/15/2019 at 8:14 AM Size Of Lesion In Cm: 0.5 Bill As A Line Item Or As Units: Line Item What Was Performed First?: Curettage Render Post-Care Instructions In Note?: no Body Location Override (Optional - Billing Will Still Be Based On Selected Body Map Location If Applicable): Mid chest Anesthesia Type: 1% lidocaine with epinephrine Additional Information: (Optional): The wound was cleaned, Mupirocin ointment applied. The patient received detailed post-op instructions. Consent was obtained from the patient. The risks, benefits and alternatives to therapy were discussed in detail. Specifically, the risks of infection, scarring, bleeding, prolonged wound healing, nerve injury, incomplete removal, allergy to anesthesia and recurrence were addressed. Alternatives to ED&C, such as: surgical removal and XRT were also discussed.  Prior to the procedure, the treatment site was clearly identified and confirmed by the patient. All components of Universal Protocol/PAUSE Rule completed. Number Of Curettages: 3 Size Of Lesion After Curettage: 1.1 Post-Care Instructions: I reviewed with the patient in detail post-care instructions. Patient is to keep the area dry for 48 hours, and not to engage in any swimming until the area is healed. Should the patient develop any fevers, chills, bleeding, severe pain patient will contact the office immediately. Detail Level: Detailed

## 2019-04-15 NOTE — CARE COORDINATION
[address] Transitions Interview     4/15/2019    Patient: Amaury Briceño Patient : 1945   MRN: [id number]  Reason for Admission: Hematoma of leg  RARS: Readmission Risk Score: 23         Spoke with: Amaury Briceño and wife      Readmission Risk  Patient Active Problem List   Diagnosis    Osteoarthritis of lumbar spine    B12 deficiency    Obstructive sleep apnea on CPAP    Memory loss    Anxiety and depression    Abnormal stress test    Coronary artery disease    Hx of CABG    Hypertension    H/O heart artery stent    Chronic anticoagulation    Chronic bilateral low back pain without sciatica    Atrial fibrillation with RVR ([address]    Avulsion of right hamstring muscle    Chronic blood loss anemia    Morbid obesity (HCC)    Hematoma of leg, unspecified laterality, initial encounter       Inpatient Assessment  Care Transitions Summary    Care Transitions Inpatient Review  Medication Review  Do you have all of your prescriptions and are they filled?:  Yes   Are you able to afford your medications?:  Yes  How often do you have difficulty taking your medications?:  I always take them as prescribed. Housing Review  Who do you live with?:  Partner/Spouse/SO  Are you an active caregiver in your home?:  No  Social Support  Do you have a ?:  No  Do you have a [address] [place] [address]?:  No  Durable Medical Equipment  Patient DME:  Straight cane, Walker  Functional Review  Ability to seek help/take action for Emergent/Urgent situations i.e. fire, crime, inclement weather or health crisis. :  Independent  Ability handle personal hygiene needs (bathing/dressing/grooming): Independent  Ability to manage medications: Independent  Ability to prepare food:  Independent  Ability to maintain home (clean home, laundry): Independent  Ability to drive and/or has transportation:  Independent  Ability to do shopping:  Independent  Ability to manage finances:   Independent  Is patient able to live independently?:  Yes  Hearing and Vision  Visual Impairment:  Reading glasses  Hearing Impairment:  None  Care Transitions Interventions         Follow Up : Met at bedside with Mr. Charles Boyd and wife, discussed BPCI-A process. Contact information given. Vikki Councilman is agreeable with appropriate follow up after discharge. Patient lives at home with his wife. He has several items of DME in the home. He is independent of ADL's , medications, and finances. Will follow as inpatient and at discharge. Future Appointments   Date Time Provider Jon [place]   5/16/2019  1:15 PM Maria Teresa Viramontes MD [facility]   8/5/2019 10:30 AM SANTY Mckeon [facility]       Health Maintenance  There are no preventive care reminders to display for this patient.     Carlito Santana RN

## 2019-04-16 LAB
ANION GAP SERPL CALCULATED.3IONS-SCNC: 10 MMOL/L (ref 7–19)
BLOOD BANK DISPENSE STATUS: NORMAL
BLOOD BANK DISPENSE STATUS: NORMAL
BLOOD BANK PRODUCT CODE: NORMAL
BLOOD BANK PRODUCT CODE: NORMAL
BPU ID: NORMAL
BPU ID: NORMAL
BUN BLDV-MCNC: 17 MG/DL (ref 8–23)
CALCIUM SERPL-MCNC: 8.1 MG/DL (ref 8.8–10.2)
CHLORIDE BLD-SCNC: 103 MMOL/L (ref 98–111)
CO2: 25 MMOL/L (ref 22–29)
CREAT SERPL-MCNC: 0.9 MG/DL (ref 0.5–1.2)
DESCRIPTION BLOOD BANK: NORMAL
DESCRIPTION BLOOD BANK: NORMAL
GFR NON-AFRICAN AMERICAN: >60
GLUCOSE BLD-MCNC: 122 MG/DL (ref 74–109)
HCT VFR BLD CALC: 24.5 % (ref 42–52)
HEMOGLOBIN: 7.5 G/DL (ref 14–18)
INR BLD: 1.54 (ref 0.88–1.18)
MCH RBC QN AUTO: 33.5 PG (ref 27–31)
MCHC RBC AUTO-ENTMCNC: 30.6 G/DL (ref 33–37)
MCV RBC AUTO: 109.4 FL (ref 80–94)
PDW BLD-RTO: 21.4 % (ref 11.5–14.5)
PLATELET # BLD: 150 K/UL (ref 130–400)
PMV BLD AUTO: 9 FL (ref 9.4–12.4)
POTASSIUM SERPL-SCNC: 4.2 MMOL/L (ref 3.5–5)
PROTHROMBIN TIME: 17.8 SEC (ref 12–14.6)
RBC # BLD: 2.24 M/UL (ref 4.7–6.1)
SODIUM BLD-SCNC: 138 MMOL/L (ref 136–145)
WBC # BLD: 7.9 K/UL (ref 4.8–10.8)

## 2019-04-16 PROCEDURE — 6370000000 HC RX 637 (ALT 250 FOR IP): Performed by: HOSPITALIST

## 2019-04-16 PROCEDURE — 36415 COLL VENOUS BLD VENIPUNCTURE: CPT

## 2019-04-16 PROCEDURE — APPSS30 APP SPLIT SHARED TIME 16-30 MINUTES: Performed by: PHYSICIAN ASSISTANT

## 2019-04-16 PROCEDURE — 2700000000 HC OXYGEN THERAPY PER DAY

## 2019-04-16 PROCEDURE — 6370000000 HC RX 637 (ALT 250 FOR IP): Performed by: INTERNAL MEDICINE

## 2019-04-16 PROCEDURE — 2580000003 HC RX 258: Performed by: HOSPITALIST

## 2019-04-16 PROCEDURE — 36430 TRANSFUSION BLD/BLD COMPNT: CPT

## 2019-04-16 PROCEDURE — 80048 BASIC METABOLIC PNL TOTAL CA: CPT

## 2019-04-16 PROCEDURE — 99232 SBSQ HOSP IP/OBS MODERATE 35: CPT | Performed by: FAMILY MEDICINE

## 2019-04-16 PROCEDURE — 85027 COMPLETE CBC AUTOMATED: CPT

## 2019-04-16 PROCEDURE — 85610 PROTHROMBIN TIME: CPT

## 2019-04-16 PROCEDURE — 86923 COMPATIBILITY TEST ELECTRIC: CPT

## 2019-04-16 PROCEDURE — 1210000000 HC MED SURG R&B

## 2019-04-16 PROCEDURE — 6370000000 HC RX 637 (ALT 250 FOR IP): Performed by: FAMILY MEDICINE

## 2019-04-16 PROCEDURE — P9016 RBC LEUKOCYTES REDUCED: HCPCS

## 2019-04-16 RX ORDER — 0.9 % SODIUM CHLORIDE 0.9 %
250 INTRAVENOUS SOLUTION INTRAVENOUS ONCE
Status: DISCONTINUED | OUTPATIENT
Start: 2019-04-16 | End: 2019-04-19 | Stop reason: HOSPADM

## 2019-04-16 RX ORDER — OXYCODONE AND ACETAMINOPHEN 10; 325 MG/1; MG/1
1 TABLET ORAL EVERY 4 HOURS PRN
Status: DISCONTINUED | OUTPATIENT
Start: 2019-04-16 | End: 2019-04-19 | Stop reason: HOSPADM

## 2019-04-16 RX ADMIN — PREGABALIN 100 MG: 50 CAPSULE ORAL at 08:49

## 2019-04-16 RX ADMIN — OXYCODONE HYDROCHLORIDE AND ACETAMINOPHEN 1 TABLET: 5; 325 TABLET ORAL at 16:13

## 2019-04-16 RX ADMIN — FAMOTIDINE 20 MG: 20 TABLET ORAL at 08:50

## 2019-04-16 RX ADMIN — FLUOXETINE 60 MG: 20 CAPSULE ORAL at 08:49

## 2019-04-16 RX ADMIN — CYANOCOBALAMIN TAB 500 MCG 1000 MCG: 500 TAB at 08:50

## 2019-04-16 RX ADMIN — METOPROLOL SUCCINATE 25 MG: 25 TABLET, EXTENDED RELEASE ORAL at 08:49

## 2019-04-16 RX ADMIN — Medication 1 TABLET: at 08:49

## 2019-04-16 RX ADMIN — Medication 10 ML: at 20:45

## 2019-04-16 RX ADMIN — Medication 10 ML: at 08:50

## 2019-04-16 RX ADMIN — FAMOTIDINE 20 MG: 20 TABLET ORAL at 20:44

## 2019-04-16 RX ADMIN — OXYCODONE HYDROCHLORIDE AND ACETAMINOPHEN 1 TABLET: 5; 325 TABLET ORAL at 11:17

## 2019-04-16 RX ADMIN — OXYCODONE HYDROCHLORIDE AND ACETAMINOPHEN 1 TABLET: 5; 325 TABLET ORAL at 06:14

## 2019-04-16 RX ADMIN — OXYCODONE HYDROCHLORIDE AND ACETAMINOPHEN 1 TABLET: 5; 325 TABLET ORAL at 02:14

## 2019-04-16 RX ADMIN — OXYCODONE AND ACETAMINOPHEN 1 TABLET: 10; 325 TABLET ORAL at 20:44

## 2019-04-16 RX ADMIN — PREGABALIN 100 MG: 50 CAPSULE ORAL at 20:44

## 2019-04-16 RX ADMIN — ROSUVASTATIN CALCIUM 10 MG: 10 TABLET, FILM COATED ORAL at 20:44

## 2019-04-16 ASSESSMENT — ENCOUNTER SYMPTOMS
WHEEZING: 0
RESPIRATORY NEGATIVE: 1
EYES NEGATIVE: 1
GASTROINTESTINAL NEGATIVE: 1
COUGH: 0
BACK PAIN: 0
EYE REDNESS: 0
NAUSEA: 0
CONSTIPATION: 0
EYE PAIN: 0
ABDOMINAL PAIN: 0
DIARRHEA: 0
EYE DISCHARGE: 0
VOMITING: 0
SORE THROAT: 0
SHORTNESS OF BREATH: 0
BLOOD IN STOOL: 0

## 2019-04-16 ASSESSMENT — PAIN SCALES - GENERAL
PAINLEVEL_OUTOF10: 2
PAINLEVEL_OUTOF10: 7
PAINLEVEL_OUTOF10: 6
PAINLEVEL_OUTOF10: 2
PAINLEVEL_OUTOF10: 7
PAINLEVEL_OUTOF10: 1
PAINLEVEL_OUTOF10: 6
PAINLEVEL_OUTOF10: 7

## 2019-04-16 NOTE — PLAN OF CARE
Problem: Falls - Risk of:  Goal: Will remain free from falls  Description  Will remain free from falls  4/16/2019 0453 by Nicolle Dudley RN  Outcome: Ongoing  4/15/2019 2017 by Ji Ramirez RN  Outcome: Ongoing  Goal: Absence of physical injury  Description  Absence of physical injury  4/16/2019 0453 by Nicolle Dudley RN  Outcome: Ongoing  4/15/2019 2017 by Ji Ramirez RN  Outcome: Ongoing     Problem: Venous Thromboembolism:  Goal: Will show no signs or symptoms of venous thromboembolism  Description  Will show no signs or symptoms of venous thromboembolism  4/16/2019 0453 by Nicolle Dudley RN  Outcome: Ongoing  4/15/2019 2017 by Ji Ramirez RN  Outcome: Ongoing  Goal: Absence of signs or symptoms of impaired coagulation  Description  Absence of signs or symptoms of impaired coagulation  4/16/2019 0453 by Nicolle Dudley RN  Outcome: Ongoing  4/15/2019 2017 by Ji Ramirez RN  Outcome: Ongoing     Problem: Pain:  Goal: Pain level will decrease  Description  Pain level will decrease  4/16/2019 0453 by Nicolle Dudley RN  Outcome: Ongoing  4/15/2019 2017 by Ji Ramirez RN  Outcome: Ongoing  Goal: Control of acute pain  Description  Control of acute pain  4/16/2019 0453 by Nicolle Dudley RN  Outcome: Ongoing  4/15/2019 2017 by Ji Ramirez RN  Outcome: Ongoing  Goal: Control of chronic pain  Description  Control of chronic pain  Outcome: Ongoing   Electronically signed by Nicolle Dudley RN on 4/16/2019 at 4:53 AM

## 2019-04-16 NOTE — PLAN OF CARE
Problem: Falls - Risk of:  Goal: Will remain free from falls  Description  Will remain free from falls  Outcome: Ongoing  Goal: Absence of physical injury  Description  Absence of physical injury  Outcome: Ongoing     Problem: Venous Thromboembolism:  Goal: Will show no signs or symptoms of venous thromboembolism  Description  Will show no signs or symptoms of venous thromboembolism  Outcome: Ongoing  Goal: Absence of signs or symptoms of impaired coagulation  Description  Absence of signs or symptoms of impaired coagulation  Outcome: Ongoing     Problem: Pain:  Goal: Pain level will decrease  Description  Pain level will decrease  Outcome: Ongoing  Goal: Control of acute pain  Description  Control of acute pain  Outcome: Ongoing

## 2019-04-16 NOTE — PROGRESS NOTES
Orthopedic Surgery Progress Note    Blanca Tolliver  4/16/2019      Subjective:     Systemic or Specific Complaints: Pain Control    Pain moderate    Objective:     Patient Vitals for the past 24 hrs:   BP Temp Temp src Pulse Resp SpO2   04/16/19 1324 (!) 99/55 98.7 °F (37.1 °C) Temporal 91 20 96 %   04/16/19 1137 (!) 126/56 97.9 °F (36.6 °C) Temporal 68 16 96 %   04/16/19 0903 (!) 107/53 97.4 °F (36.3 °C) Temporal 84 16 94 %   04/16/19 0854 -- -- -- 85 -- --   04/16/19 0840 (!) 104/57 98.4 °F (36.9 °C) Temporal 76 18 96 %   04/16/19 0641 (!) 103/56 97.8 °F (36.6 °C) Temporal 61 18 93 %   04/15/19 1855 119/60 98.3 °F (36.8 °C) Temporal 71 14 94 %       right lower - surrounding muscle compartments are less firm and more compressible upon today's examination  General: alert, appears stated age and cooperative   Wound: N/A             Dressing: N/A   Extremity: Distal NVI           DVT Exam: No evidence of DVT seen on physical exam.                   Data Review:  Recent Labs     04/15/19  0436 04/16/19  0408   HGB 6.7* 7.5*     Recent Labs     04/16/19  0408      K 4.2   CREATININE 0.9       Assessment:     HD# 3 with right lower extremity swelling due to presumed venous bleed secondary to a hamstring injury for 3 weeks with improved physical exam findings are less concerning for impending compartment syndrome today    Plan:      1:  DVT prophylaxis, ICE, elevate  2:  Pain control  3:  Physical therapy/Occupational therapy  4:  Anticipate discharge once clinical condition has improved and if pain well controlled  5: Weight bearing as tolerated       Electronically signed by Yaw Rivera MD on 4/16/2019 at 3:26 PM

## 2019-04-16 NOTE — PROGRESS NOTES
[facility] ([facility]ists    Patient:  Ursula Alanis  YOB: 1945  Date of Service: 4/16/2019  MRN: [id number]   Acct: [de-identified]   Primary Care Physician: Edwin Sylvester MD  Advance Directive: Full Code  Admit Date: 4/14/2019       Hospital Day: 2    CHIEF COMPLAINT RLE pain    SUBJECTIVE: Mr. El Ferguson states RLE is still painful but mildly improved. Denies CP, heart palpitations, dyspnea. Wife concerned today that he is not being anticoagulated, discussed risks/benefits and they agree to continue holding anticoagulation for the time being. Review of Systems:   14 point review of systems is negative except as specifically addressed above. Objective:   VITALS:  BP (!) 107/53   Pulse 84   Temp 97.4 °F (36.3 °C) (Temporal)   Resp 16   Ht 5' 8\" (1.727 m)   Wt 265 lb (120.2 kg)   SpO2 94%   BMI 40.29 kg/m²   24HR INTAKE/OUTPUT:      Intake/Output Summary (Last 24 hours) at 4/16/2019 1129  Last data filed at 4/16/2019 0919  Gross per 24 hour   Intake 701.45 ml   Output 2525 ml   Net -1823.55 ml     General appearance: alert, appears stated age, cooperative and no distress, resting comfortably in bed  Head: Normocephalic, without obvious abnormality, atraumatic  Eyes: conjunctivae/corneas clear. PERRL, EOM's intact.    Ears: normal external ears and nose, throat without exudate  Neck: no adenopathy, no carotid bruit, no JVD, supple, symmetrical, trachea midline and thyroid not enlarged, symmetric, no tenderness/mass/nodules  Lungs: diminished at bases otherwise clear to auscultation bilaterally,no rales or wheezes   Heart: RRR, S1, S2 normal, no murmur  Abdomen:soft, obese, non-tender; non-distended, normal bowel sounds no masses, no organomegaly  Extremities: Diffuse RLE edema, tenderness RLE, Right calf measuring 17.5\", scattered ecchymotic areas  Skin: Skin color, texture, turgor normal. No rashes or lesions  Lymphatic: No palpable lymph node enlargment  Neurologic: Alert and oriented X 3, normal strength and tone. No focal deficits  Psychiatric: Alert and oriented, thought content appropriate, normal insight, mood appropriate    Medications:      0.9 % sodium chloride  250 mL Intravenous Once    sodium chloride flush  10 mL Intravenous 2 times per day    famotidine  20 mg Oral BID    FLUoxetine  60 mg Oral Daily    metoprolol succinate  25 mg Oral Daily    pregabalin  100 mg Oral BID    rosuvastatin  10 mg Oral Nightly    vitamin B-12  1,000 mcg Oral Daily    calcium-cholecalciferol  1 tablet Oral Daily     sodium chloride flush, ondansetron, potassium chloride **OR** potassium alternative oral replacement **OR** potassium chloride, magnesium sulfate, polyethylene glycol, cetirizine, nitroGLYCERIN, oxyCODONE-acetaminophen  DIET CARDIAC;     Lab and other Data:     Recent Labs     04/14/19  1805 04/15/19  0436 04/16/19  0408   WBC 7.1 7.5 7.9   HGB 7.5* 6.7* 7.5*    150 150     Recent Labs     04/14/19  0328 04/15/19  0436 04/16/19  0408    143 138   K 4.2 4.0 4.2    108 103   CO2 24 26 25   BUN 16 17 17   CREATININE 0.9 0.7 0.9   GLUCOSE 125* 104 122*     INR:   Recent Labs     04/14/19  0328 04/15/19  0436 04/16/19  0831   INR 2.88* 2.03* 1.54*      RAD:   Cta Lower Extremity Right W Wo Contrast  Addendum Date: 4/14/2019    Addendum: There are typographic errors in the initial report: Impression should state:    Result Date: 4/14/2019  ORIGINAL REPORT  Exam:   CTA LOWER EXTREMITY RIGHT W WO CONTRAST  Date:  4/14/2019 History:  Male, age  72 years; leg swelling COMPARISON:  None. Findings : Vascular findings: The right common iliac and extra iliac arteries demonstrate atherosclerotic disease, without flow-limiting stenosis. The right common femoral, superficial femoral, the profunda, popliteal arteries demonstrate no flow-limiting stenosis or occlusion. There is appropriate trifurcation of the popliteal artery with mild stenosis. No occlusion identified.  Nonvascular findings: There is edema circumferentially involving the right lower extremity from the acetabulum to the foot. Additionally, there are organized fluid collections, without enhancement identified in the posterior aspect of the right lower extremity, favored to be hematomas. Superiorly there is a fluid collection measuring 14 x 4 x 5.5 cm. More inferiorly and still in the posterior compartment an additional discrete fluid collection measuring 13 x 5.5 x 8.5 cm. An additional adjacent inferior fluid collection measuring 2.2 x 2.9 x 2.1 cm is noted. There is no acute osseous pathology. Impression: 1. No occlusion or high-grade stenosis in the left humerus fixation of the lumbar extremity. 2. 3 discrete hematoma above the calf as described above. Signed by Dr Izabella Brown on 4/14/2019 9:38 AM ADDENDUM #1  Impression: 1. No occlusion or high-grade stenosis in the  right lower extremity. 2. 3 discrete hematomas above the calf as described above. Signed by Dr Izabella Brown on 4/14/2019 12:04 PM    Assessment/Plan   Principal Problem:    Hematoma of leg, unspecified laterality, initial encounter  Active Problems:    Coronary artery disease    Hx of CABG    Chronic anticoagulation    Osteoarthritis of lumbar spine    Obstructive sleep apnea on CPAP    Avulsion of right hamstring muscle    Chronic blood loss anemia    Morbid obesity (HCC)    Paroxysmal atrial fibrillation ([address]  Resolved Problems:    * No resolved hospital problems. *     Coumadin/Lovenox on hold. No need for intervention at this time per ortho, less concerning for compartment syndrome but need to continue to monitor closely. 1 unit PRBC transfused per Hematology. HR in NSR. Further orders per clinical course. GI prophylaxis:  Alona Villanueva PA-C       Patient was examined independently. Chart reviewed and events noted. No new complaints other than stated above. Vitals noted. Chest CTAB. S1 S2 RRR. GI bening. Neuro no new deficits. Integumentary fading ecchymosis over abdomen, right thigh and leg edema without erythema. RLE tender. Labs noted. Pain control    Agree with above assessment and plan. Will continue to follow.     Audra Triplett MD  Hospitalist service  4/16/2019   5:49 PM

## 2019-04-16 NOTE — PROGRESS NOTES
Patient name: Negrita Panda  Patient : 1945  7:38 AM  ROOM 309    Patient Active Problem List   Diagnosis Code    Osteoarthritis of lumbar spine M47.816    B12 deficiency E53.8    Obstructive sleep apnea on CPAP G47.33, Z99.89    Memory loss R41.3    Anxiety and depression F41.9, F32.9    Abnormal stress test R94.39    Coronary artery disease I25.10    Hx of CABG Z95.1    Hypertension I10    H/O heart artery stent Z95.5    Chronic anticoagulation Z79.01    Chronic bilateral low back pain without sciatica M54.5, G89.29    Atrial fibrillation with RVR ([facility]) I48.91    Avulsion of right hamstring muscle S76.391A    Chronic blood loss anemia D50.0    Morbid obesity ([facility]) E66.01    Hematoma of leg, unspecified laterality, initial encounter S80.10XA    Paroxysmal atrial fibrillation ([facility]) I48.0       Subjective: Pain presently controlled. Hgb stable, 7.9. HISTORY OF PRESENT ILLNESS:   Mr. Zayra Baeza was seen in initial consultation on 3/30/2019 during hospitalization at [facility] admitted with right lower extremity thigh pain with decreased mobility with chronic deep vein thrombosis involving the distal femoral and chronic superficial thrombophlebitis in short saphous vein as documented on 3/28/2019 study.      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  He has a chronic indwelling [place] filter in place.      Kya Blount had had a pain/stimulator placed by Dr. Deedee Epps on 3/20/2019 for chronic back and bilateral leg pain.  For the procedure he was placed on Lovenox. Warfarin was resumed on 3/28/2019 with an INR of 2.6.      His coagulation laboratory parameters are being checked in [facility].     Venous duplex of RLE on 3/28/2019- Chronic deep vein thrombosis involving the distal femoral and chronic superficial thrombophlebitis in short saphous vein     CT of right redness. Respiratory: Negative. Negative for cough, shortness of breath and wheezing. Cardiovascular: Negative. Negative for chest pain, palpitations and leg swelling. Gastrointestinal: Negative. Negative for abdominal pain, blood in stool, constipation, diarrhea, nausea and vomiting. Endocrine: Negative for polydipsia. Genitourinary: Negative for dysuria, flank pain, frequency, hematuria and urgency. Musculoskeletal: Negative. Negative for back pain, myalgias and neck pain. RLE edema and hematomas   Skin: Negative. Negative for rash. Neurological: Positive for weakness. Negative for dizziness, tremors, seizures and headaches. Hematological: Does not bruise/bleed easily. Psychiatric/Behavioral: Negative. The patient is not nervous/anxious. Current Pain Assessment  Pain Assessment  Pain Assessment: 0-10  Pain Level: 7  Pain Type: Acute pain  Pain Location: Leg  Pain Orientation: Right  Response to Pain Intervention: Asleep with RR greater than 10    OBJECTIVE:  VITALS: BP (!) 103/56   Pulse 61   Temp 97.8 °F (36.6 °C) (Temporal)   Resp 18   Ht 5' 8\" (1.727 m)   Wt 265 lb (120.2 kg)   SpO2 93%   BMI 40.29 kg/m²   I&O:     Intake/Output Summary (Last 24 hours) at 4/16/2019 0738  Last data filed at 4/16/2019 3657  Gross per 24 hour   Intake 331.45 ml   Output 2525 ml   Net -2193.55 ml         Physical Exam   Constitutional: He is oriented to person, place, and time. He appears well-developed and well-nourished. No distress. HENT:   Head: Normocephalic and atraumatic. Mouth/Throat: Oropharynx is clear and moist.   Eyes: Conjunctivae are normal. Right eye exhibits no discharge. Left eye exhibits no discharge. No scleral icterus. Neck: Normal range of motion. Neck supple. No JVD present. No tracheal deviation present. Cardiovascular: Normal rate, regular rhythm, normal heart sounds and intact distal pulses. Exam reveals no gallop and no friction rub.    No murmur heard.  Pulmonary/Chest: Effort normal and breath sounds normal. No respiratory distress. He has no wheezes. He has no rales. He exhibits no tenderness. Abdominal: Soft. Bowel sounds are normal. He exhibits no distension and no mass. There is no tenderness. There is no rebound and no guarding. No hernia. Musculoskeletal: He exhibits edema. He exhibits no tenderness or deformity. RLE edema and hematomas   Neurological: He is alert and oriented to person, place, and time. No cranial nerve deficit. Coordination normal.   Skin: Skin is warm. No rash noted. He is not diaphoretic. No erythema. No pallor. Psychiatric: He has a normal mood and affect. His behavior is normal. Thought content normal.   Nursing note and vitals reviewed. BMP:   Recent Labs     04/15/19  0436 04/16/19  0408    138   K 4.0 4.2    103   CO2 26 25   BUN 17 17   CREATININE 0.7 0.9   GLUCOSE 104 122*   CALCIUM 8.1* 8.1*     CBC:   Recent Labs     04/15/19  0436 04/16/19  0408   WBC 7.5 7.9   HGB 6.7* 7.5*   HCT 21.8* 24.5*   .8* 109.4*    150     CMP:    Recent Labs     04/15/19  0436 04/16/19  0408    138   K 4.0 4.2    103   CO2 26 25   BUN 17 17   CREATININE 0.7 0.9   LABGLOM >60 >60   GLUCOSE 104 122*   CALCIUM 8.1* 8.1*       30Day lookback of cultures:    Blood Culture Recent: No results for input(s): BC in the last 720 hours. Gram Stain Recent: No results for input(s): LABGRAM in the last 720 hours. Resp Culture Recent: No results for input(s): CULTRESP in the last 720 hours. Body Fluid Recent : No results for input(s): BFCX in the last 720 hours. MRSA Recent : No results for input(s): [address] in the last 720 hours. Urine Culture Recent : No results for input(s): LABURIN in the last 720 hours. Organism Recent : No results for input(s): ORG in the last 720 hours. Radiology:   Ct Abdomen Pelvis Wo Contrast Additional Contrast? None    Result Date: 3/29/2019  Examination.  CT ABDOMEN PELVIS WO CONTRAST History: The patient complains of abdominal pain. History of retroperitoneal hematoma. DLP: 1742 mGycm. The CT scan of the abdomen and pelvis is performed without oral or intravenous contrast enhancement. The images are acquired in axial plane with subsequent reconstruction in coronal and sagittal planes. There is no previous study for comparison. The lung bases included in the study show scarring and areas of discoid atelectasis in the lower lungs bilaterally. A tiny, 2 mm, subpleural nodule is seen in the right lower lobe laterally, image #6 in axial plane. Severe atheromatous changes of the coronary arteries and the visualized thoracic aorta are noted. The unenhanced liver and spleen appear unremarkable. A markedly distended gallbladder seen no gallstones. Common bile duct is normal. The pancreas is normal. The adrenal glands bilaterally are normal. There is marked lobulation of renal contour bilaterally suggesting chronic renal cortical scarring. There are bilateral peripelvic renal cysts, more pronounced on the right side. No hydronephrosis. The ureters bilaterally are normal. The urinary bladder is moderately well distended. No intrinsic abnormality. There is a previous prostatectomy surgery. The small fat-containing inguinal hernias bilaterally are seen, left larger than the right. Postsurgical changes of the stomach are noted. The duodenum is normal. The small bowel is normal. A normal appendix is seen. Moderate gas and stool are seen in the colon. There is diverticulosis of the distal colon. No evidence for diverticulitis. The atheromatous changes of the abdominal aorta and iliac arteries are seen. No aneurysmal dilatation. An inferior vena cava filter seen in place with distal end opposite mid body of L2 There is no evidence of abdominal or pelvic lymphadenopathy. Chronic degenerative changes of the lumbar spine are seen with evidence of hardware fusion of L5-S1.  A dorsal column fluid collections, without enhancement identified in the posterior aspect of the right lower extremity, favored to be hematomas. Superiorly there is a fluid collection measuring 14 x 4 x 5.5 cm. More inferiorly and still in the posterior compartment an additional discrete fluid collection measuring 13 x 5.5 x 8.5 cm. An additional adjacent inferior fluid collection measuring 2.2 x 2.9 x 2.1 cm is noted. There is no acute osseous pathology. Impression: 1. No occlusion or high-grade stenosis in the left humerus fixation of the lumbar extremity. 2. 3 discrete hematoma above the calf as described above. Signed by Dr Desirae Schmitt on 4/14/2019 9:38 AM ADDENDUM #1  Impression: 1. No occlusion or high-grade stenosis in the  right lower extremity. 2. 3 discrete hematomas above the calf as described above. Signed by Dr Desirae Schmitt on 4/14/2019 12:04 PM    Cta Abdominal Aorta W Bilat Runoff W Wo Contrast    Result Date: 3/31/2019  Indication 72year-old with back pain. Technique Spiral imaging was performed through the abdomen, the pelvis, and both lower extremities during uneventful administration of intravenous contrast, with bolus optimized for arterial evaluation. 3-D MIP reformatted images were obtained. Automated exposure control was utilized to limit radiation dose. DLP 3620 mGy-cm Findings ABDOMEN/PELVIS Mild bronchiectasis in the lung bases with minimal atelectasis. Previous median sternotomy. The heart is enlarged. No pericardial effusion. No liver or splenic masses. Gallbladder is distended. Postoperative changes of the stomach. No adrenal mass. No obstructive uropathy. No acute abnormality of the bowel. Evidence of previous small bowel surgery. Colonic diverticulosis. Dorsal column stimulator lead. ABDOMINAL AORTA There is mild atherosclerosis of the abdominal aorta without aneurysm or dissection. Celiac axis and SMA are widely patent. No renal artery stenosis is seen.  The inferior mesenteric artery is patent. There is diffuse asymmetric swelling of the entire right leg. I suspect a hematoma in the posterior compartment right side. I cannot determine if this is an INTRAmuscular hematoma or an INTERmuscular hematoma. RIGHT LOWER EXTREMITY Mild atherosclerosis of the right common iliac artery which is tortuous proximally. No focal stenosis however. Right external iliac artery is widely patent with minimal plaque. Nonstenotic CFA. Mild diffuse calcific plaque of the SFA without stenosis. Popliteal artery is normal. There is trifurcation disease. Mild/moderate stenosis of the tibioperoneal trunk. Severe multifocal stenoses of the anterior tibial artery. Multifocal severe stenoses of the posterior tibial artery. There is difficulty evaluating the distal tibial vessels due to the calcific plaque. I do believe there is three-vessel runoff to the right foot however. LEFT LOWER EXTREMITY Mild atherosclerosis of the common iliac artery which is nonstenotic. Nonstenotic external iliac artery and CFA with mild atherosclerosis. Mild to moderate atherosclerosis of the distal SFA without significant luminal narrowing. Portions of the left popliteal artery is obscured by streak artifact from the left knee arthroplasty. Otherwise no popliteal artery stenosis is suspected. Trifurcation disease. Mild multifocal stenoses of the anterior/posterior tibial arteries and probably artery. There is three-vessel runoff at the ankle. 1. Asymmetric right leg swelling with hematoma in the posterior compartment right thigh. I suspect this may all be related to an acute hamstrings avulsion injury. There is a 2 cm ossific fragment in the mid/distal thigh, possibly the avulsed and retracted fragment. Confirmation with MRI may be obtained. 2. Trifurcation and tibial disease with three-vessel runoff at both ankles.  Signed by Dr Linnea Smith on 3/31/2019 10:10 AM    Ct Femur Right Wo Contrast    Addendum Date: 3/29/2019    Addendum: There is also superficial soft tissue swelling laterally and posteriorly at the level of the mid and distal femoral shaft, correlate for cellulitis. There is no superficial abscess identified. Signed by Dr Ira Trejo on 3/29/2019 8:48 AM    Result Date: 3/29/2019   ORIGINAL REPORT History: 53-year-old with swelling and induration. Reference: None. TECHNIQUE: Unenhanced CT imaging of the right femur. Automated exposure control was utilized to limit radiation dose. DLP 1459 mGy-cm Findings: The right femur is intact. No fracture. No bone lesion or periosteal reaction. No cortical destruction. No right hip fracture or dislocation. Very mild osteoarthritis of the right hip. Right superior and inferior pubic rami are intact. There is severe hamstrings insertional tendinosis with asymmetric soft tissue density at the site of insertion. There is nonspecific fluid collection centrally in the right thigh musculature between the medial abductor muscles and semitendinosis. This collection measures approximately 3.7 cm in transverse dimension by 11 cm in craniocaudal dimension by 1.7 cm in AP dimension. Vascular calcifications. 1. Intact right femur without fracture or dislocation of the hip or knee joints. 2. Nonspecific elongated fluid collection in the central right thigh. Consider muscular tear as cause but this is nonspecific. MRI is recommended. 3. Severe right hamstring tendinosis with ischiogluteal bursitis.  Signed by Dr Ira Trejo on 3/29/2019 8:43 AM ADDENDUM #1    Vl Extremity Venous Right    Result Date: 3/29/2019  Vascular Lower Extremities DVT Study Procedure  Demographics   Patient Name    Aziza Arguello Age                   72   Patient Number  [id number]           Gender                Male   Visit Number    [id number]        Jaison Gunter MD                                   Physician   Date of Birth   1945       Referring Physician   Ligia Echeverria   Accession       [id number]        [id number] Chayito Kraig RVT  Number  Procedure Type of Study:   Veins:Lower Extremities DVT Study, VL EXTREMITY VENOUS DUPLEX RIGHT. Indications for Study:Pain, right lower extremity and Edema, right lower extremity. Impression   There is evidence of chronic deep vein thrombosis in the right lower  extremity involving the distal femoral vein. Chronic superficial thrombophlebitis is seen in the short saphenous vein  of the right lower extremity(ies). Signature   ----------------------------------------------------------------  Electronically signed by Merced Monterroso MD(Interpreting  physician) on 03/29/2019 07:45 AM  ----------------------------------------------------------------  Velocities are measured in cm/s ; Diameters are measured in mm Right Lower Extremities DVT Study Measurements Right 2D Measurements +------------------------------------+----------+---------------+----------+ ! Location                            ! Visualized! Compressibility! Thrombosis! +------------------------------------+----------+---------------+----------+ ! Sapheno Femoral Junction            ! Yes       ! Yes            ! None      ! +------------------------------------+----------+---------------+----------+ ! Common Femoral                      !Yes       ! Yes            ! None      ! +------------------------------------+----------+---------------+----------+ ! Prox Femoral                        !Yes       ! Yes            ! None      ! +------------------------------------+----------+---------------+----------+ ! Mid Femoral                         !Yes       ! Yes            ! None      ! +------------------------------------+----------+---------------+----------+ ! Dist Femoral                        !Yes       ! No             !Chronic   ! +------------------------------------+----------+---------------+----------+ ! Deep Femoral                        !Yes       ! Yes            ! None      ! +------------------------------------+----------+---------------+----------+ ! Popliteal                           !Yes       ! Yes            ! None      ! +------------------------------------+----------+---------------+----------+ ! SSV                                 ! Yes       ! No             !Chronic   ! +------------------------------------+----------+---------------+----------+ ! Gastroc                             ! Yes       ! Yes            ! None      ! +------------------------------------+----------+---------------+----------+ ! PTV                                 ! Yes       ! Yes            ! None      ! +------------------------------------+----------+---------------+----------+ ! GSV                                 ! Yes       ! Yes            ! None      ! +------------------------------------+----------+---------------+----------+ ! ATV                                 ! Yes       ! Yes            ! None      ! +------------------------------------+----------+---------------+----------+ ! Peroneal                            !Yes       ! Yes            ! None      ! +------------------------------------+----------+---------------+----------+      Medications  Current Facility-Administered Medications   Medication Dose Route Frequency Provider Last Rate Last Dose    sodium chloride flush 0.9 % injection 10 mL  10 mL Intravenous 2 times per day Dasha Wolfe MD   10 mL at 04/15/19 1942    sodium chloride flush 0.9 % injection 10 mL  10 mL Intravenous PRN Dasha Wolfe MD        ondansetron [facility]) injection 4 mg  4 mg Intravenous Q6H PRN Dasha Wolfe MD        potassium chloride (KLOR-CON M) extended release tablet 40 mEq  40 mEq Oral PRCELSA Wolfe MD        Or    potassium bicarb-citric acid (EFFER-K) effervescent tablet 40 mEq  40 mEq Oral PRCELSA Wolfe MD        Or    potassium chloride 10 mEq/100 mL IVPB (Peripheral Line)  10 mEq Intravenous PRN Dasha Wolfe MD        magnesium sulfate 1 g in dextrose 5% 100 mL IVPB  1 g Intravenous PRN Megan Oconnor MD        polyethylene glycol [facility]) packet 17 g  17 g Oral Daily PRN Megan Oconnor MD   17 g at 04/14/19 2239    cetirizine (ZYRTEC) tablet 10 mg  10 mg Oral Daily PRN Megan Oconnor MD        famotidine (PEPCID) tablet 20 mg  20 mg Oral BID Megan Oconnor MD   20 mg at 04/15/19 1943    FLUoxetine (PROZAC) capsule 60 mg  60 mg Oral Daily Megan Oconnor MD   60 mg at 04/15/19 0858    metoprolol succinate (TOPROL XL) extended release tablet 25 mg  25 mg Oral Daily Megan Oconnor MD   25 mg at 04/15/19 0858    nitroGLYCERIN (NITROSTAT) SL tablet 0.4 mg  0.4 mg Sublingual Q5 Min PRN Megan Oconnor MD        pregabalin (LYRICA) capsule 100 mg  100 mg Oral BID Megan Oconnor MD   100 mg at 04/15/19 1943    rosuvastatin (CRESTOR) tablet 10 mg  10 mg Oral Nightly Megan Oconnor MD   10 mg at 04/15/19 1943    vitamin B-12 (CYANOCOBALAMIN) tablet 1,000 mcg  1,000 mcg Oral Daily Megan Oconnor MD   1,000 mcg at 04/15/19 6766    calcium-cholecalciferol 500-200 MG-UNIT per tablet 1 tablet  1 tablet Oral Daily Megan Oconnor MD   1 tablet at 04/15/19 0858    oxyCODONE-acetaminophen (PERCOCET) 5-325 MG per tablet 1 tablet  1 tablet Oral Q4H PRN Tim Marx DO   1 tablet at 04/16/19 1535       Allergies  Iodine; Morphine; and Shellfish-derived products      ASSESSMENT/PLAN:  RLE pain/swelling with chronic deep vein thrombosis and superficial thrombophlebitis      - Venous duplex of RLE on 3/28/2019- Chronic deep vein thrombosis involving the distal femoral and chronic superficial thrombophlebitis in short saphous vein  - CT of right femur on 3/29/2019- Severe right hamstring tendinosis with ischiogluteal bursitis. Nonspecific elongated fluid collection in the central right thigh. Intact right femur without fracture or dislocation of the hip or knee joints.   - CTA RLE 4/14/19 there is no evidence of right arterial occlusion or high-grade stenosis Three evaluation with plan of care reviewed and developed with Bentley Moeller and nursing staff. I have addended and/or modified the above history of present illness, physical examination, and assessment and plan to reflect my findings and impressions. Essential elements of the care plan were discussed with Blanac BURT . Agree with findings and assessment/plan as documented above. Questions were encouraged, asked and answered to their understanding and satisfaction.     Electronically signed by Oscar Garcia MD on 4/16/19 at 8:28 AM

## 2019-04-16 NOTE — PROGRESS NOTES
Right leg measuring 17 inches at liang.  Electronically signed by Papito Joaquin RN on 4/16/2019 at 8:59 AM

## 2019-04-16 NOTE — PROGRESS NOTES
RLE measuring 17\". Will cont. Monitoring.   Electronically signed by Drew Rodriges RN on 4/16/2019 at 2:21 AM

## 2019-04-16 NOTE — PROGRESS NOTES
RLE measuring right at 17.25 inches at a premarked spot on patient's right calf. Will continue monitoring.     Electronically signed by Karan Hill RN on 4/15/2019 at 9:22 PM

## 2019-04-17 LAB
ANISOCYTOSIS: ABNORMAL
BASOPHILS ABSOLUTE: 0 K/UL (ref 0–0.2)
BASOPHILS RELATIVE PERCENT: 0.4 % (ref 0–1)
EOSINOPHILS ABSOLUTE: 0.2 K/UL (ref 0–0.6)
EOSINOPHILS RELATIVE PERCENT: 2.3 % (ref 0–5)
HCT VFR BLD CALC: 28.2 % (ref 42–52)
HEMOGLOBIN: 8.8 G/DL (ref 14–18)
HYPOCHROMIA: ABNORMAL
INR BLD: 1.38 (ref 0.88–1.18)
LYMPHOCYTES ABSOLUTE: 1.4 K/UL (ref 1.1–4.5)
LYMPHOCYTES RELATIVE PERCENT: 18.4 % (ref 20–40)
MACROCYTES: ABNORMAL
MCH RBC QN AUTO: 33.5 PG (ref 27–31)
MCHC RBC AUTO-ENTMCNC: 31.2 G/DL (ref 33–37)
MCV RBC AUTO: 107.2 FL (ref 80–94)
MONOCYTES ABSOLUTE: 1.2 K/UL (ref 0–0.9)
MONOCYTES RELATIVE PERCENT: 16.1 % (ref 0–10)
NEUTROPHILS ABSOLUTE: 4.6 K/UL (ref 1.5–7.5)
NEUTROPHILS RELATIVE PERCENT: 62.3 % (ref 50–65)
OVALOCYTES: ABNORMAL
PDW BLD-RTO: 22.3 % (ref 11.5–14.5)
PLATELET # BLD: 178 K/UL (ref 130–400)
PLATELET SLIDE REVIEW: ADEQUATE
PMV BLD AUTO: 9.2 FL (ref 9.4–12.4)
POLYCHROMASIA: ABNORMAL
PROTHROMBIN TIME: 16.3 SEC (ref 12–14.6)
RBC # BLD: 2.63 M/UL (ref 4.7–6.1)
WBC # BLD: 7.4 K/UL (ref 4.8–10.8)

## 2019-04-17 PROCEDURE — 6370000000 HC RX 637 (ALT 250 FOR IP): Performed by: HOSPITALIST

## 2019-04-17 PROCEDURE — 36415 COLL VENOUS BLD VENIPUNCTURE: CPT

## 2019-04-17 PROCEDURE — 2580000003 HC RX 258: Performed by: HOSPITALIST

## 2019-04-17 PROCEDURE — 99232 SBSQ HOSP IP/OBS MODERATE 35: CPT | Performed by: PHYSICIAN ASSISTANT

## 2019-04-17 PROCEDURE — 2700000000 HC OXYGEN THERAPY PER DAY

## 2019-04-17 PROCEDURE — 1210000000 HC MED SURG R&B

## 2019-04-17 PROCEDURE — 30233N1 TRANSFUSION OF NONAUTOLOGOUS RED BLOOD CELLS INTO PERIPHERAL VEIN, PERCUTANEOUS APPROACH: ICD-10-PCS | Performed by: FAMILY MEDICINE

## 2019-04-17 PROCEDURE — 85025 COMPLETE CBC W/AUTO DIFF WBC: CPT

## 2019-04-17 PROCEDURE — 6370000000 HC RX 637 (ALT 250 FOR IP): Performed by: FAMILY MEDICINE

## 2019-04-17 PROCEDURE — 85610 PROTHROMBIN TIME: CPT

## 2019-04-17 RX ADMIN — PREGABALIN 100 MG: 50 CAPSULE ORAL at 08:56

## 2019-04-17 RX ADMIN — Medication 10 ML: at 21:54

## 2019-04-17 RX ADMIN — PREGABALIN 100 MG: 50 CAPSULE ORAL at 21:51

## 2019-04-17 RX ADMIN — CYANOCOBALAMIN TAB 500 MCG 1000 MCG: 500 TAB at 08:56

## 2019-04-17 RX ADMIN — FLUOXETINE 60 MG: 20 CAPSULE ORAL at 08:56

## 2019-04-17 RX ADMIN — FAMOTIDINE 20 MG: 20 TABLET ORAL at 21:51

## 2019-04-17 RX ADMIN — Medication 10 ML: at 09:00

## 2019-04-17 RX ADMIN — ROSUVASTATIN CALCIUM 10 MG: 10 TABLET, FILM COATED ORAL at 21:51

## 2019-04-17 RX ADMIN — FAMOTIDINE 20 MG: 20 TABLET ORAL at 08:56

## 2019-04-17 RX ADMIN — Medication 1 TABLET: at 08:56

## 2019-04-17 RX ADMIN — OXYCODONE AND ACETAMINOPHEN 1 TABLET: 10; 325 TABLET ORAL at 21:51

## 2019-04-17 RX ADMIN — METOPROLOL SUCCINATE 25 MG: 25 TABLET, EXTENDED RELEASE ORAL at 08:56

## 2019-04-17 RX ADMIN — OXYCODONE AND ACETAMINOPHEN 1 TABLET: 10; 325 TABLET ORAL at 14:17

## 2019-04-17 RX ADMIN — OXYCODONE AND ACETAMINOPHEN 1 TABLET: 10; 325 TABLET ORAL at 04:16

## 2019-04-17 RX ADMIN — OXYCODONE AND ACETAMINOPHEN 1 TABLET: 10; 325 TABLET ORAL at 09:45

## 2019-04-17 ASSESSMENT — ENCOUNTER SYMPTOMS
BACK PAIN: 0
EYE REDNESS: 0
VOMITING: 0
BLOOD IN STOOL: 0
SORE THROAT: 0
EYE DISCHARGE: 0
DIARRHEA: 0
RESPIRATORY NEGATIVE: 1
COUGH: 0
NAUSEA: 0
CONSTIPATION: 0
EYE PAIN: 0
ABDOMINAL PAIN: 0
WHEEZING: 0
EYES NEGATIVE: 1
SHORTNESS OF BREATH: 0
GASTROINTESTINAL NEGATIVE: 1

## 2019-04-17 ASSESSMENT — PAIN SCALES - GENERAL
PAINLEVEL_OUTOF10: 7
PAINLEVEL_OUTOF10: 1
PAINLEVEL_OUTOF10: 7
PAINLEVEL_OUTOF10: 0
PAINLEVEL_OUTOF10: 6
PAINLEVEL_OUTOF10: 6
PAINLEVEL_OUTOF10: 2

## 2019-04-17 NOTE — CARE COORDINATION
SW visited with Pt to discuss d/c planning needs. He indicated he lives at home with spouse, has a walker if needed, but stated he is pretty independent; does not currently have [facility] services at home either. SW asked if he felt comfortable and safe to return home; he said he does; SW asked if he has ever thought about going to short-term rehab as the physician wanted this option discussed. He said it has been mentioned to him recently, but he wants SW to talk to his wife; he stated she would be here before 4:30PM.    Electronically signed by CALLY Hernandez on 4/17/2019 at 2:47 PM     **f/u visit with Pt and wife; they both indicate they would rather d/c home with [facility] services instead of swingbed or SNF at this time; discussed all options; Keely Rice notified.     Electronically signed by CALLY Hernandez on 4/17/2019 at 5:31 PM

## 2019-04-17 NOTE — PROGRESS NOTES
Patient name: Negrita Cruz  Patient : 1945  7:07 AM  ROOM 309    Patient Active Problem List   Diagnosis Code    Osteoarthritis of lumbar spine M47.816    B12 deficiency E53.8    Obstructive sleep apnea on CPAP G47.33, Z99.89    Memory loss R41.3    Anxiety and depression F41.9, F32.9    Abnormal stress test R94.39    Coronary artery disease I25.10    Hx of CABG Z95.1    Hypertension I10    H/O heart artery stent Z95.5    Chronic anticoagulation Z79.01    Chronic bilateral low back pain without sciatica M54.5, G89.29    Atrial fibrillation with RVR ([facility]) I48.91    Avulsion of right hamstring muscle S76.391A    Chronic blood loss anemia D50.0    Morbid obesity ([facility]) E66.01    Hematoma of leg, unspecified laterality, initial encounter S80.10XA    Paroxysmal atrial fibrillation ([facility]) I48.0    Anemia D64.9       Subjective: Pain presently controlled. C/O of headache and not sleeping well. Hgb stable, 8.8. HISTORY OF PRESENT ILLNESS:   Mr. Ralph Albert was seen in initial consultation on 3/30/2019 during hospitalization at [facility] admitted with right lower extremity thigh pain with decreased mobility with chronic deep vein thrombosis involving the distal femoral and chronic superficial thrombophlebitis in short saphous vein as documented on 3/28/2019 study.      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  He has a chronic indwelling Deepak filter in place.      Kirti Cooper had had a pain/stimulator placed by Dr. Bentley Jaime on 3/20/2019 for chronic back and bilateral leg pain.  For the procedure he was placed on Lovenox. Warfarin was resumed on 3/28/2019 with an INR of 2.6.      His coagulation laboratory parameters are being checked in [facility]     Venous duplex of RLE on 3/28/2019- Chronic deep vein thrombosis involving the distal femoral and chronic superficial thrombophlebitis in short saphous vein     CT of right femur on 3/29/2019- Severe right hamstring tendinosis with ischiogluteal bursitis. Nonspecific elongated fluid collection in the central right thigh. Intact right femur without fracture or dislocation of the hip or knee joints.     Medical records Dr. Cindi Frank 4/14/2014:  Dr. Charo Boyd 's consultation note from 4/14/2014 documented that Denae Riley had a strong family history of clotting disorders to include his sister developed a DVT at the age of 12, his son developed DVT at the age of 12 and a daughter developed DVT and PE while on birth control with a low protein S.     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     Denae Riley is readmitted 4/14/19 for significant RLE swelling. Yesterday, 4/13/2019, he sat out in the yard with his grandson working on flowers. He states that he sat in a folding lawn chair, the typical kind with the bar across the front, somewhat reclining, that puts pressure right above the knee posteriorly, the precise area of his newly developed hematoma. He states that the symptoms of the right lower extremity began yesterday afternoon after being out in the yard in the lawn chair.     - CTA RLE 4/14/19 there is no evidence of right arterial occlusion or high-grade stenosis Three discrete hematomas above the calf, measuring (1) 14 x 4 x 5.5 cm  (2) 13 x 5.5 x 8.5 cm  (3) 2.2 x 2.9 x 2.1 cm. No occlusion or high-grade stenosis in the  right lower extremity. Review of Systems   Constitutional: Positive for fatigue. Negative for chills, diaphoresis and fever. HENT: Negative.   Negative for congestion, ear pain, hearing loss, nosebleeds, sore throat and friction rub. No murmur heard. Pulmonary/Chest: Effort normal and breath sounds normal. No respiratory distress. He has no wheezes. He has no rales. He exhibits no tenderness. Abdominal: Soft. Bowel sounds are normal. He exhibits no distension and no mass. There is no tenderness. There is no rebound and no guarding. No hernia. Musculoskeletal: He exhibits edema. He exhibits no tenderness or deformity. RLE edema and hematomas   Neurological: He is alert and oriented to person, place, and time. No cranial nerve deficit. Coordination normal.   Skin: Skin is warm. No rash noted. He is not diaphoretic. No erythema. No pallor. Psychiatric: He has a normal mood and affect. His behavior is normal. Thought content normal.   Nursing note and vitals reviewed. BMP:   Recent Labs     04/15/19  0436 04/16/19 0408    138   K 4.0 4.2    103   CO2 26 25   BUN 17 17   CREATININE 0.7 0.9   GLUCOSE 104 122*   CALCIUM 8.1* 8.1*     CBC:   Recent Labs     04/16/19  0408 04/17/19  0345   WBC 7.9 7.4   HGB 7.5* 8.8*   HCT 24.5* 28.2*   .4* 107.2*    178     CMP:    Recent Labs     04/15/19  0436 04/16/19  0408    138   K 4.0 4.2    103   CO2 26 25   BUN 17 17   CREATININE 0.7 0.9   LABGLOM >60 >60   GLUCOSE 104 122*   CALCIUM 8.1* 8.1*       30Day lookback of cultures:    Blood Culture Recent: No results for input(s): BC in the last 720 hours. Gram Stain Recent: No results for input(s): LABGRAM in the last 720 hours. Resp Culture Recent: No results for input(s): CULTRESP in the last 720 hours. Body Fluid Recent : No results for input(s): BFCX in the last 720 hours. MRSA Recent : No results for input(s): [address] in the last 720 hours. Urine Culture Recent : No results for input(s): LABURIN in the last 720 hours. Organism Recent : No results for input(s): ORG in the last 720 hours.        Radiology:   Ct Abdomen Pelvis Wo Contrast Additional Contrast? None    Result Date: 3/29/2019  Examination. CT ABDOMEN PELVIS WO CONTRAST History: The patient complains of abdominal pain. History of retroperitoneal hematoma. DLP: 1742 mGycm. The CT scan of the abdomen and pelvis is performed without oral or intravenous contrast enhancement. The images are acquired in axial plane with subsequent reconstruction in coronal and sagittal planes. There is no previous study for comparison. The lung bases included in the study show scarring and areas of discoid atelectasis in the lower lungs bilaterally. A tiny, 2 mm, subpleural nodule is seen in the right lower lobe laterally, image #6 in axial plane. Severe atheromatous changes of the coronary arteries and the visualized thoracic aorta are noted. The unenhanced liver and spleen appear unremarkable. A markedly distended gallbladder seen no gallstones. Common bile duct is normal. The pancreas is normal. The adrenal glands bilaterally are normal. There is marked lobulation of renal contour bilaterally suggesting chronic renal cortical scarring. There are bilateral peripelvic renal cysts, more pronounced on the right side. No hydronephrosis. The ureters bilaterally are normal. The urinary bladder is moderately well distended. No intrinsic abnormality. There is a previous prostatectomy surgery. The small fat-containing inguinal hernias bilaterally are seen, left larger than the right. Postsurgical changes of the stomach are noted. The duodenum is normal. The small bowel is normal. A normal appendix is seen. Moderate gas and stool are seen in the colon. There is diverticulosis of the distal colon. No evidence for diverticulitis. The atheromatous changes of the abdominal aorta and iliac arteries are seen. No aneurysmal dilatation. An inferior vena cava filter seen in place with distal end opposite mid body of L2 There is no evidence of abdominal or pelvic lymphadenopathy.  Chronic degenerative changes of the lumbar spine are seen with evidence of hardware fusion of L5-S1. A dorsal column stimulator electrodes are seen in place. There is a mild dextroscoliosis. No focal bony abnormality or a bone lesion. There is evidence of upper abdominal midline fat-containing ventral hernia. There is no evidence of retroperitoneal hematoma. No acute abnormality of the abdomen or pelvis. No evidence of retroperitoneal hematoma. A very distended gallbladder without stones. This may be further evaluated with radionuclide hepatobiliary imaging. The appendix is normal. The diverticulosis of the distal colon. No evidence for diverticulitis. Postsurgical changes of the stomach. No focal complication. The above finding are recorded on a digital voice clip in PACS. Signed by Dr David Tapia on 3/29/2019 8:14 AM    Xr Lumbar Spine (2-3 Views)    Result Date: 3/20/2019  History: 70-year-old with spinal cord stimulator. Reference: None. FINDINGS: 2 intraoperative fluoroscopic digital store images at the thoracolumbar junction demonstrate dorsal column stimulator leads. Total images 2. Fluoroscopic time not available. Signed by Dr Yue Turner on 3/20/2019 10:25 PM    Cta Lower Extremity Right W Wo Contrast    Addendum Date: 4/14/2019    Addendum: There are typographic errors in the initial report: Impression should state:    Result Date: 4/14/2019   ORIGINAL REPORT  Exam:   CTA LOWER EXTREMITY RIGHT W WO CONTRAST  Date:  4/14/2019 History:  Male, age  72 years; leg swelling COMPARISON:  None. Findings : Vascular findings: The right common iliac and extra iliac arteries demonstrate atherosclerotic disease, without flow-limiting stenosis. The right common femoral, superficial femoral, the profunda, popliteal arteries demonstrate no flow-limiting stenosis or occlusion. There is appropriate trifurcation of the popliteal artery with mild stenosis. No occlusion identified. Nonvascular findings: There is edema circumferentially involving the right lower extremity from the acetabulum to the foot. Additionally, there are organized fluid collections, without enhancement identified in the posterior aspect of the right lower extremity, favored to be hematomas. Superiorly there is a fluid collection measuring 14 x 4 x 5.5 cm. More inferiorly and still in the posterior compartment an additional discrete fluid collection measuring 13 x 5.5 x 8.5 cm. An additional adjacent inferior fluid collection measuring 2.2 x 2.9 x 2.1 cm is noted. There is no acute osseous pathology. Impression: 1. No occlusion or high-grade stenosis in the left humerus fixation of the lumbar extremity. 2. 3 discrete hematoma above the calf as described above. Signed by Dr Mary Esienberg on 4/14/2019 9:38 AM ADDENDUM #1  Impression: 1. No occlusion or high-grade stenosis in the  right lower extremity. 2. 3 discrete hematomas above the calf as described above. Signed by Dr Mary Eisenberg on 4/14/2019 12:04 PM    Cta Abdominal Aorta W Bilat Runoff W Wo Contrast    Result Date: 3/31/2019  Indication 72year-old with back pain. Technique Spiral imaging was performed through the abdomen, the pelvis, and both lower extremities during uneventful administration of intravenous contrast, with bolus optimized for arterial evaluation. 3-D MIP reformatted images were obtained. Automated exposure control was utilized to limit radiation dose. DLP 3620 mGy-cm Findings ABDOMEN/PELVIS Mild bronchiectasis in the lung bases with minimal atelectasis. Previous median sternotomy. The heart is enlarged. No pericardial effusion. No liver or splenic masses. Gallbladder is distended. Postoperative changes of the stomach. No adrenal mass. No obstructive uropathy. No acute abnormality of the bowel. Evidence of previous small bowel surgery. Colonic diverticulosis. Dorsal column stimulator lead. ABDOMINAL AORTA There is mild atherosclerosis of the abdominal aorta without aneurysm or dissection. Celiac axis and SMA are widely patent. No renal artery stenosis is seen.  The inferior mesenteric artery is patent. There is diffuse asymmetric swelling of the entire right leg. I suspect a hematoma in the posterior compartment right side. I cannot determine if this is an INTRAmuscular hematoma or an INTERmuscular hematoma. RIGHT LOWER EXTREMITY Mild atherosclerosis of the right common iliac artery which is tortuous proximally. No focal stenosis however. Right external iliac artery is widely patent with minimal plaque. Nonstenotic CFA. Mild diffuse calcific plaque of the SFA without stenosis. Popliteal artery is normal. There is trifurcation disease. Mild/moderate stenosis of the tibioperoneal trunk. Severe multifocal stenoses of the anterior tibial artery. Multifocal severe stenoses of the posterior tibial artery. There is difficulty evaluating the distal tibial vessels due to the calcific plaque. I do believe there is three-vessel runoff to the right foot however. LEFT LOWER EXTREMITY Mild atherosclerosis of the common iliac artery which is nonstenotic. Nonstenotic external iliac artery and CFA with mild atherosclerosis. Mild to moderate atherosclerosis of the distal SFA without significant luminal narrowing. Portions of the left popliteal artery is obscured by streak artifact from the left knee arthroplasty. Otherwise no popliteal artery stenosis is suspected. Trifurcation disease. Mild multifocal stenoses of the anterior/posterior tibial arteries and probably artery. There is three-vessel runoff at the ankle. 1. Asymmetric right leg swelling with hematoma in the posterior compartment right thigh. I suspect this may all be related to an acute hamstrings avulsion injury. There is a 2 cm ossific fragment in the mid/distal thigh, possibly the avulsed and retracted fragment. Confirmation with MRI may be obtained. 2. Trifurcation and tibial disease with three-vessel runoff at both ankles.  Signed by Dr Glendy Ahmadi on 3/31/2019 10:10 AM    Ct Femur Right Wo Contrast    Addendum Date: 3/29/2019 [id number]        [address] [place] [address] RVT  Number  Procedure Type of Study:   Veins:Lower Extremities DVT Study, VL EXTREMITY VENOUS DUPLEX RIGHT. Indications for Study:Pain, right lower extremity and Edema, right lower extremity. Impression   There is evidence of chronic deep vein thrombosis in the right lower  extremity involving the distal femoral vein. Chronic superficial thrombophlebitis is seen in the short saphenous vein  of the right lower extremity(ies). Signature   ----------------------------------------------------------------  Electronically signed by Louis Lizarraga MD(Interpreting  physician) on 03/29/2019 07:45 AM  ----------------------------------------------------------------  Velocities are measured in cm/s ; Diameters are measured in mm Right Lower Extremities DVT Study Measurements Right 2D Measurements +------------------------------------+----------+---------------+----------+ ! Location                            ! Visualized! Compressibility! Thrombosis! +------------------------------------+----------+---------------+----------+ ! Sapheno Femoral Junction            ! Yes       ! Yes            ! None      ! +------------------------------------+----------+---------------+----------+ ! Common Femoral                      !Yes       ! Yes            ! None      ! +------------------------------------+----------+---------------+----------+ ! Prox Femoral                        !Yes       ! Yes            ! None      ! +------------------------------------+----------+---------------+----------+ ! Mid Femoral                         !Yes       ! Yes            ! None      ! +------------------------------------+----------+---------------+----------+ ! Dist Femoral                        !Yes       ! No             !Chronic   ! +------------------------------------+----------+---------------+----------+ ! Deep Femoral                        !Yes       ! Yes            ! None      ! +------------------------------------+----------+---------------+----------+ ! Popliteal                           !Yes       ! Yes            ! None      ! +------------------------------------+----------+---------------+----------+ ! SSV                                 ! Yes       ! No             !Chronic   ! +------------------------------------+----------+---------------+----------+ ! Gastroc                             ! Yes       ! Yes            ! None      ! +------------------------------------+----------+---------------+----------+ ! PTV                                 ! Yes       ! Yes            ! None      ! +------------------------------------+----------+---------------+----------+ ! GSV                                 ! Yes       ! Yes            ! None      ! +------------------------------------+----------+---------------+----------+ ! ATV                                 ! Yes       ! Yes            ! None      ! +------------------------------------+----------+---------------+----------+ ! Peroneal                            !Yes       ! Yes            ! None      ! +------------------------------------+----------+---------------+----------+      Medications  Current Facility-Administered Medications   Medication Dose Route Frequency Provider Last Rate Last Dose    0.9 % sodium chloride infusion 250 mL  250 mL Intravenous Once SANTY Delgado        oxyCODONE-acetaminophen (PERCOCET)  MG per tablet 1 tablet  1 tablet Oral Q4H PRN Lois George MD   1 tablet at 04/17/19 0416    sodium chloride flush 0.9 % injection 10 mL  10 mL Intravenous 2 times per day Dasha Wolfe MD   10 mL at 04/16/19 2045    sodium chloride flush 0.9 % injection 10 mL  10 mL Intravenous PRN Dasha Wolfe MD        ondansetron [facility]) injection 4 mg  4 mg Intravenous Q6H PRN Dasha Wolfe MD        potassium chloride [facility]) extended release tablet 40 mEq  40 mEq Oral PRN Dasha Wolfe MD        Or    potassium bicarb-citric acid (EFFER-K) effervescent tablet 40 mEq  40 mEq Oral PRN Leanne Faith MD        Or    potassium chloride 10 mEq/100 mL IVPB (Peripheral Line)  10 mEq Intravenous PRN Leanne Faith MD        magnesium sulfate 1 g in dextrose 5% 100 mL IVPB  1 g Intravenous PRN Leanne Faith MD        polyethylene glycol [facility]) packet 17 g  17 g Oral Daily PRN Leanne Faith MD   17 g at 04/14/19 2239    cetirizine (ZYRTEC) tablet 10 mg  10 mg Oral Daily PRN Leanne Faith MD        famotidine (PEPCID) tablet 20 mg  20 mg Oral BID Leanne Faith MD   20 mg at 04/16/19 2044    FLUoxetine (PROZAC) capsule 60 mg  60 mg Oral Daily Leanne Faith MD   60 mg at 04/16/19 0849    metoprolol succinate (TOPROL XL) extended release tablet 25 mg  25 mg Oral Daily Leanne Faith MD   25 mg at 04/16/19 0849    nitroGLYCERIN (NITROSTAT) SL tablet 0.4 mg  0.4 mg Sublingual Q5 Min PRN Leanne Faith MD        pregabalin (LYRICA) capsule 100 mg  100 mg Oral BID Leanne Faith MD   100 mg at 04/16/19 2044    rosuvastatin (CRESTOR) tablet 10 mg  10 mg Oral Nightly Leanne Faith MD   10 mg at 04/16/19 2044    vitamin B-12 (CYANOCOBALAMIN) tablet 1,000 mcg  1,000 mcg Oral Daily Leanne Faith MD   1,000 mcg at 04/16/19 0850    calcium-cholecalciferol 500-200 MG-UNIT per tablet 1 tablet  1 tablet Oral Daily Leanne Faith MD   1 tablet at 04/16/19 0849    oxyCODONE-acetaminophen (PERCOCET) 5-325 MG per tablet 1 tablet  1 tablet Oral Q4H PRN Naman Mcgee DO   1 tablet at 04/16/19 1613       Allergies  Iodine; Morphine; and Shellfish-derived products      ASSESSMENT/PLAN:  RLE pain/swelling with chronic deep vein thrombosis and superficial thrombophlebitis      - Venous duplex of RLE on 3/28/2019- Chronic deep vein thrombosis involving the distal femoral and chronic superficial thrombophlebitis in short saphous vein  - CT of right femur on 3/29/2019- Severe right hamstring tendinosis with ischiogluteal bursitis. Nonspecific elongated fluid collection in the central right thigh. Intact right femur without fracture or dislocation of the hip or knee joints. - CTA RLE 4/14/19 there is no evidence of right arterial occlusion or high-grade stenosis Three discrete hematomas above the calf, measuring (1) 14 x 4 x 5.5 cm  (2) 13 x 5.5 x 8.5 cm  (3) 2.2 x 2.9 x 2.1 cm. No occlusion or high-grade stenosis in the  right lower extremity. - evaluated by ortho, Dr. Polly Ziegler who felt there were no immediate concerns for surgical intervention, however monitoring closely for compartment syndrome and need for fasciotomy  - followed by vascular surgery, Dr. Scott Grimm as well          GEE Gene Homozygous Mutation, chronic anticoagulation with Warfarin (managed by Dr. Darlyn Zavala)   Bard Galeano had a chronic indwelling IVC [place] filter     Admit coags on 4/14/2019 3:28 AM  -  PT/INR 29.4 / 2.88  -  PTT 99.4     -  Vitamin K 5 mg on 4/14/2019,  per Dr. Scott Grimm  -  1 unit FFP on 4/14/2019, per Dr. Scott Grimm   -  aPTT 61 o 4/15/2019  -  PT/INR 16.3/1.38      Macrocytic anemia  -  HgB 8.8, .2- s/p 1 unit of pRBCs 4/15/2019 and 1/16/2019. Serology 3/29/19:  ? Ferritin 108.5  ? Iron 87  ? Iron saturation 29%             ? TIBC 296  ? Folate 14.7  ? Vitamin B12 795  ? Reticulocyte count 0.1022  · Stool for occult blood negative on 4/3/2019    -  May need to consider bone marrow aspiration and biopsy in the future due to elevated MCV to R/O MDS.     Dr. Scott Grimm recommend on 4/14/2019, discontinuing ALL anticoagulation (plavix, coumadin, and especially lovenox) for at least 1 week, then restarting coumadin and aspirin (no lovenox, no plavix). Anticipate resuming on Coumadin and ASA on 4/21/2019. Ortho assisting and recommends DVT prophylaxis, ICE, and elevation. Weight loss is also encouraged, something the patient can do for himself to reduce risks of further DVT's, and cardiac event prevention.     SharmaineSANTY Brooks    04/17/19  7:07 AM      Extended conference undertaken with the patient and his daughter. I personally saw and examined this patient, performing a face-to-face diagnostic evaluation with plan of care reviewed and developed with Jody Robins and nursing staff. I have addended and/or modified the above history of present illness, physical examination, and assessment and plan to reflect my findings and impressions. Essential elements of the care plan were discussed with Blanca BURT . Agree with findings and assessment/plan as documented above. Questions were encouraged, asked and answered to their understanding and satisfaction.     Electronically signed by Valerie Sung MD on 4/17/19 at 2:19 PM

## 2019-04-17 NOTE — PROGRESS NOTES
Intravenous Once    sodium chloride flush  10 mL Intravenous 2 times per day    famotidine  20 mg Oral BID    FLUoxetine  60 mg Oral Daily    metoprolol succinate  25 mg Oral Daily    pregabalin  100 mg Oral BID    rosuvastatin  10 mg Oral Nightly    vitamin B-12  1,000 mcg Oral Daily    calcium-cholecalciferol  1 tablet Oral Daily     oxyCODONE-acetaminophen, sodium chloride flush, ondansetron, potassium chloride **OR** potassium alternative oral replacement **OR** potassium chloride, magnesium sulfate, polyethylene glycol, cetirizine, nitroGLYCERIN, oxyCODONE-acetaminophen  DIET CARDIAC;     Lab and other Data:     Recent Labs     04/15/19  0436 04/16/19  0408 04/17/19  0345   WBC 7.5 7.9 7.4   HGB 6.7* 7.5* 8.8*    150 178     Recent Labs     04/15/19  0436 04/16/19  0408    138   K 4.0 4.2    103   CO2 26 25   BUN 17 17   CREATININE 0.7 0.9   GLUCOSE 104 122*     INR:   Recent Labs     04/15/19  0436 04/16/19  0831 04/17/19  0345   INR 2.03* 1.54* 1.38*      RAD:   Cta Lower Extremity Right W Wo Contrast  Addendum Date: 4/14/2019    Addendum: There are typographic errors in the initial report: Impression should state:    Result Date: 4/14/2019  ORIGINAL REPORT  Exam:   CTA LOWER EXTREMITY RIGHT W WO CONTRAST  Date:  4/14/2019 History:  Male, age  72 years; leg swelling COMPARISON:  None. Findings : Vascular findings: The right common iliac and extra iliac arteries demonstrate atherosclerotic disease, without flow-limiting stenosis. The right common femoral, superficial femoral, the profunda, popliteal arteries demonstrate no flow-limiting stenosis or occlusion. There is appropriate trifurcation of the popliteal artery with mild stenosis. No occlusion identified. Nonvascular findings: There is edema circumferentially involving the right lower extremity from the acetabulum to the foot.  Additionally, there are organized fluid collections, without enhancement identified in the posterior aspect of the right lower extremity, favored to be hematomas. Superiorly there is a fluid collection measuring 14 x 4 x 5.5 cm. More inferiorly and still in the posterior compartment an additional discrete fluid collection measuring 13 x 5.5 x 8.5 cm. An additional adjacent inferior fluid collection measuring 2.2 x 2.9 x 2.1 cm is noted. There is no acute osseous pathology. Impression: 1. No occlusion or high-grade stenosis in the left humerus fixation of the lumbar extremity. 2. 3 discrete hematoma above the calf as described above. Signed by Dr Linda Cam on 4/14/2019 9:38 AM ADDENDUM #1  Impression: 1. No occlusion or high-grade stenosis in the  right lower extremity. 2. 3 discrete hematomas above the calf as described above. Signed by Dr Linda Cam on 4/14/2019 12:04 PM    Assessment/Plan   Principal Problem:    Hematoma of leg, unspecified laterality, initial encounter  Active Problems:    Coronary artery disease    Hx of CABG    Chronic anticoagulation    Osteoarthritis of lumbar spine    Obstructive sleep apnea on CPAP    Avulsion of right hamstring muscle    Chronic blood loss anemia    Morbid obesity (HCC)    Paroxysmal atrial fibrillation (HCC)    Anemia  Resolved Problems:    * No resolved hospital problems. *     Increase activity, will consult PT. May need SNF placement. Will request SW to follow. Hemoglobin stable, INR now 1.38. Further orders per clinical course.     GI prophylaxis:  Alona Sibley PA-C

## 2019-04-17 NOTE — PROGRESS NOTES
Patient's right leg measuring 16.5 inches this am. Electronically signed by Marcin Austin RN on 4/17/2019 at 10:25 AM

## 2019-04-18 LAB
INR BLD: 1.23 (ref 0.88–1.18)
PROTHROMBIN TIME: 14.9 SEC (ref 12–14.6)

## 2019-04-18 PROCEDURE — 2580000003 HC RX 258: Performed by: HOSPITALIST

## 2019-04-18 PROCEDURE — 97116 GAIT TRAINING THERAPY: CPT

## 2019-04-18 PROCEDURE — 99232 SBSQ HOSP IP/OBS MODERATE 35: CPT | Performed by: PHYSICIAN ASSISTANT

## 2019-04-18 PROCEDURE — 1210000000 HC MED SURG R&B

## 2019-04-18 PROCEDURE — 97161 PT EVAL LOW COMPLEX 20 MIN: CPT

## 2019-04-18 PROCEDURE — 85610 PROTHROMBIN TIME: CPT

## 2019-04-18 PROCEDURE — 36415 COLL VENOUS BLD VENIPUNCTURE: CPT

## 2019-04-18 PROCEDURE — 2700000000 HC OXYGEN THERAPY PER DAY

## 2019-04-18 PROCEDURE — 6370000000 HC RX 637 (ALT 250 FOR IP): Performed by: FAMILY MEDICINE

## 2019-04-18 PROCEDURE — 6370000000 HC RX 637 (ALT 250 FOR IP): Performed by: HOSPITALIST

## 2019-04-18 RX ADMIN — Medication 10 ML: at 20:43

## 2019-04-18 RX ADMIN — FAMOTIDINE 20 MG: 20 TABLET ORAL at 09:33

## 2019-04-18 RX ADMIN — ROSUVASTATIN CALCIUM 10 MG: 10 TABLET, FILM COATED ORAL at 20:43

## 2019-04-18 RX ADMIN — METOPROLOL SUCCINATE 25 MG: 25 TABLET, EXTENDED RELEASE ORAL at 09:31

## 2019-04-18 RX ADMIN — Medication 1 TABLET: at 09:33

## 2019-04-18 RX ADMIN — OXYCODONE AND ACETAMINOPHEN 1 TABLET: 10; 325 TABLET ORAL at 17:15

## 2019-04-18 RX ADMIN — OXYCODONE AND ACETAMINOPHEN 1 TABLET: 10; 325 TABLET ORAL at 03:25

## 2019-04-18 RX ADMIN — CYANOCOBALAMIN TAB 500 MCG 1000 MCG: 500 TAB at 09:33

## 2019-04-18 RX ADMIN — OXYCODONE AND ACETAMINOPHEN 1 TABLET: 10; 325 TABLET ORAL at 20:43

## 2019-04-18 RX ADMIN — PREGABALIN 100 MG: 50 CAPSULE ORAL at 20:43

## 2019-04-18 RX ADMIN — FAMOTIDINE 20 MG: 20 TABLET ORAL at 20:43

## 2019-04-18 RX ADMIN — FLUOXETINE 60 MG: 20 CAPSULE ORAL at 09:33

## 2019-04-18 RX ADMIN — OXYCODONE AND ACETAMINOPHEN 1 TABLET: 10; 325 TABLET ORAL at 09:30

## 2019-04-18 RX ADMIN — PREGABALIN 100 MG: 50 CAPSULE ORAL at 09:33

## 2019-04-18 RX ADMIN — Medication 10 ML: at 09:34

## 2019-04-18 ASSESSMENT — ENCOUNTER SYMPTOMS
BACK PAIN: 0
COUGH: 0
VOMITING: 0
EYE PAIN: 0
SHORTNESS OF BREATH: 0
EYES NEGATIVE: 1
CONSTIPATION: 0
RESPIRATORY NEGATIVE: 1
SORE THROAT: 0
BLOOD IN STOOL: 0
EYE DISCHARGE: 0
GASTROINTESTINAL NEGATIVE: 1
EYE REDNESS: 0
ABDOMINAL PAIN: 0
NAUSEA: 0
WHEEZING: 0
DIARRHEA: 0

## 2019-04-18 ASSESSMENT — PAIN SCALES - GENERAL
PAINLEVEL_OUTOF10: 8
PAINLEVEL_OUTOF10: 0
PAINLEVEL_OUTOF10: 7
PAINLEVEL_OUTOF10: 7
PAINLEVEL_OUTOF10: 0
PAINLEVEL_OUTOF10: 7
PAINLEVEL_OUTOF10: 6
PAINLEVEL_OUTOF10: 0

## 2019-04-18 ASSESSMENT — PAIN DESCRIPTION - LOCATION: LOCATION: LEG

## 2019-04-18 ASSESSMENT — PAIN DESCRIPTION - PAIN TYPE: TYPE: ACUTE PAIN

## 2019-04-18 ASSESSMENT — PAIN DESCRIPTION - ORIENTATION: ORIENTATION: RIGHT

## 2019-04-18 NOTE — PROGRESS NOTES
Physical Therapy    Facility/Department: [facility]/MED  Initial Assessment    NAME: Jeovanny Laboy  : 1945  MRN: [id number]    Date of Service: 2019    Discharge Recommendations:  Continue to assess pending progress, Patient would benefit from continued therapy after discharge, Home with Home health PT, 24 hour supervision or assist        Assessment   Body structures, Functions, Activity limitations: Decreased functional mobility ; Decreased ROM; Decreased strength;Decreased balance;Decreased endurance; Increased Pain  Assessment: Pt. will benefit from PT to decrease impairements. Anticipate pt. will benefit from cont. PT after d/c from [facility]. Pt. and wife seem set on d/c to home. Pt. would benefit from HHPT to increase mobility and strength. Treatment Diagnosis: impaired gait and mobility  Prognosis: Good  Decision Making: Low Complexity  Patient Education: purpose of PT, safety, fall risk, staff A, call bell use  Barriers to Learning: none noted  REQUIRES PT FOLLOW UP: Yes  Activity Tolerance  Activity Tolerance: Patient limited by fatigue;Patient limited by endurance; Patient limited by pain       Patient Diagnosis(es): The primary encounter diagnosis was Anemia, unspecified type. Diagnoses of Hematoma of right thigh, initial encounter and Leg swelling were also pertinent to this visit. has a past medical history of Anxiety and depression, B12 deficiency, CAD (coronary artery disease), Cervical radiculopathy, Chronic bilateral low back pain without sciatica, Diverticular disease, DVT (deep venous thrombosis) ([address] Erectile dysfunction, GERD (gastroesophageal reflux disease), Hyperlipidemia, Hypertension, Morbid obesity ([address] OCD (obsessive compulsive disorder), ANDREW (obstructive sleep apnea), Paroxysmal atrial fibrillation ([address] and Unstable angina pectoris ([address] has a past surgical history that includes Gastric bypass surgery (); Spine surgery ( and );  Spine surgery (); Tonsillectomy; Cataract removal (Bilateral, 2005); Carpal tunnel release (Left); hernia repair; Coronary angioplasty with stent; Prostate surgery; knee surgery (Left, 1969); tumor removal (Right, 1973); back surgery; joint replacement (2006); Cardiac surgery (2001 and 2004); Coronary artery bypass graft (2011); Vena Cava Filter Placement; and IMPLANTATION VAGAL NERVE STIMULATOR (N/A, 3/20/2019). Restrictions  Restrictions/Precautions  Restrictions/Precautions: Fall Risk, Weight Bearing  Lower Extremity Weight Bearing Restrictions  Right Lower Extremity Weight Bearing: Weight Bearing As Tolerated  Vision/Hearing        Subjective  General  Chart Reviewed: Yes  Patient assessed for rehabilitation services?: Yes  Response To Previous Treatment: Not applicable  Family / Caregiver Present: Yes(wife)  Referring Practitioner: Dr. Gina Alba  Referral Date : 04/17/19  Diagnosis: anemia, hematoma R thigh, leg swelling  Follows Commands: Within Functional Limits  General Comment  Comments: Vipul ROJAS states that pt. has been up to the bathroom with a cane and A x 1. Subjective  Subjective: Pt. willing to get up and walk in the room. States he has been up to the bathroom.   Pain Screening  Patient Currently in Pain: Yes  Pain Assessment  Pain Assessment: 0-10  Pain Level: 6  Pain Type: Acute pain  Pain Location: Leg  Pain Orientation: Right  Vital Signs  Patient Currently in Pain: Yes  Pre Treatment Pain Screening  Intervention List: Patient able to continue with treatment    Orientation  Orientation  Overall Orientation Status: Within Functional Limits  Social/Functional History  Social/Functional History  Lives With: Spouse  Type of Home: House  Home Layout: One level, Work area in basement, Able to Live on Main level with bedroom/bathroom  Home Access: Stairs to enter without rails  Entrance Stairs - Number of Steps: 2 VIJAY  Bathroom Shower/Tub: Walk-in shower  Bathroom Toilet: Handicap height  Bathroom per day: Twice a day  Plan weeks: 2  Current Treatment Recommendations: Strengthening, ROM, Balance Training, Functional Mobility Training, Transfer Training, Endurance Training, Gait Training, Safety Education & Training, Pain Management, Patient/Caregiver Education & Training, Equipment Evaluation, Education, & procurement, Positioning  Plan Comment: cont. PT per POC.   Safety Devices  Type of devices: Call light within reach, Gait belt, Patient at risk for falls, Left in chair, Nurse notified(BLEs elevated on pillows)    G-Code       OutComes Score                                                  AM-PAC Score             Goals  Short term goals  Time Frame for Short term goals: 2 wks  Short term goal 1: supine to sit indep  Short term goal 2: sit to stand indep  Short term goal 3: amb. 100' with RW SBA  Patient Goals   Patient goals : go home       Therapy Time   Individual Concurrent Group Co-treatment   Time In           Time Out           Minutes                   Bishnu Bowers PT    Electronically signed by Bishnu Bowers PT on 4/18/2019 at 11:57 AM

## 2019-04-18 NOTE — PROGRESS NOTES
Patient name: Edmund Soto  Patient : 1945  7:39 AM  ROOM 309    Patient Active Problem List   Diagnosis Code    Osteoarthritis of lumbar spine M47.816    B12 deficiency E53.8    Obstructive sleep apnea on CPAP G47.33, Z99.89    Memory loss R41.3    Anxiety and depression F41.9, F32.9    Abnormal stress test R94.39    Coronary artery disease I25.10    Hx of CABG Z95.1    Hypertension I10    H/O heart artery stent Z95.5    Chronic anticoagulation Z79.01    Chronic bilateral low back pain without sciatica M54.5, G89.29    Atrial fibrillation with RVR ([facility]) I48.91    Avulsion of right hamstring muscle S76.391A    Chronic blood loss anemia D50.0    Morbid obesity ([facility]) E66.01    Hematoma of leg, unspecified laterality, initial encounter S80.10XA    Paroxysmal atrial fibrillation ([facility]) I48.0    Anemia D64.9       Subjective: Pain continues to be controlled. Hgb stable. Ambulated and up in chair yesterday. HISTORY OF PRESENT ILLNESS:   Mr. Gildardo Bender was seen in initial consultation on 3/30/2019 during hospitalization at [facility] admitted with right lower extremity thigh pain with decreased mobility with chronic deep vein thrombosis involving the distal femoral and chronic superficial thrombophlebitis in short saphous vein as documented on 3/28/2019 study.      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  He has a chronic indwelling Deepak filter in place.      Kunal George had had a pain/stimulator placed by Dr. Nisha Barker on 3/20/2019 for chronic back and bilateral leg pain.  For the procedure he was placed on Lovenox. Warfarin was resumed on 3/28/2019 with an INR of 2.6.      His coagulation laboratory parameters are being checked in [facility].     Venous duplex of E on 3/28/2019- Chronic deep vein thrombosis involving the distal femoral and chronic superficial thrombophlebitis in short saphous vein     CT of right femur on 3/29/2019- Severe right hamstring tendinosis with ischiogluteal bursitis. Nonspecific elongated fluid collection in the central right thigh. Intact right femur without fracture or dislocation of the hip or knee joints.     Medical records Dr. Yaw Miller 4/14/2014:  Dr. Jyoti Jim's consultation note from 4/14/2014 documented that Emelinajovita Tejeda had a strong family history of clotting disorders to include his sister developed a DVT at the age of 12, his son developed DVT at the age of 12 and a daughter developed DVT and PE while on birth control with a low protein S.     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     Emelina Tejeda is readmitted 4/14/19 for significant RLE swelling. Yesterday, 4/13/2019, he sat out in the yard with his grandson working on flowers. He states that he sat in a folding lawn chair, the typical kind with the bar across the front, somewhat reclining, that puts pressure right above the knee posteriorly, the precise area of his newly developed hematoma. He states that the symptoms of the right lower extremity began yesterday afternoon after being out in the yard in the lawn chair.     - CTA RLE 4/14/19 there is no evidence of right arterial occlusion or high-grade stenosis Three discrete hematomas above the calf, measuring (1) 14 x 4 x 5.5 cm  (2) 13 x 5.5 x 8.5 cm  (3) 2.2 x 2.9 x 2.1 cm. No occlusion or high-grade stenosis in the  right lower extremity. Review of Systems   Constitutional: Positive for fatigue. Negative for chills, diaphoresis and fever. HENT: Negative. Negative for congestion, ear pain, hearing loss, nosebleeds, sore throat and tinnitus. Eyes: Negative. Negative for pain, discharge and redness. Respiratory: Negative. Negative for cough, shortness of breath and wheezing. Cardiovascular: Negative. Negative for chest pain, palpitations and leg swelling. Gastrointestinal: Negative. Negative for abdominal pain, blood in stool, constipation, diarrhea, nausea and vomiting. Endocrine: Negative for polydipsia. Genitourinary: Negative for dysuria, flank pain, frequency, hematuria and urgency. Musculoskeletal: Negative. Negative for back pain, myalgias and neck pain. RLE edema and hematomas   Skin: Negative. Negative for rash. Neurological: Positive for weakness. Negative for dizziness, tremors, seizures and headaches. Hematological: Does not bruise/bleed easily. Psychiatric/Behavioral: Negative. The patient is not nervous/anxious. Current Pain Assessment  Pain Assessment  Pain Assessment: 0-10  Pain Level: 7  Pain Type: Acute pain  Pain Location: Leg  Pain Orientation: Right  Response to Pain Intervention: Patient Satisfied    OBJECTIVE:  VITALS: /70   Pulse 58   Temp 98.2 °F (36.8 °C)   Resp 16   Ht 5' 8\" (1.727 m)   Wt 265 lb (120.2 kg)   SpO2 96%   BMI 40.29 kg/m²   I&O:     Intake/Output Summary (Last 24 hours) at 4/18/2019 0739  Last data filed at 4/18/2019 0326  Gross per 24 hour   Intake 1080 ml   Output 2350 ml   Net -1270 ml         Physical Exam   Constitutional: He is oriented to person, place, and time. He appears well-developed and well-nourished. No distress. HENT:   Head: Normocephalic and atraumatic. Mouth/Throat: Oropharynx is clear and moist.   Eyes: Conjunctivae are normal. Right eye exhibits no discharge. Left eye exhibits no discharge. No scleral icterus. Neck: Normal range of motion. Neck supple. No JVD present. No tracheal deviation present. Cardiovascular: Normal rate, regular rhythm, normal heart sounds and intact distal pulses. Exam reveals no gallop and no friction rub. No murmur heard. Pulmonary/Chest: Effort normal and breath sounds normal. No respiratory distress. He has no wheezes. He has no rales. He exhibits no tenderness. Abdominal: Soft. Bowel sounds are normal. He exhibits no distension and no mass. There is no tenderness. There is no rebound and no guarding. No hernia. Musculoskeletal: He exhibits edema. He exhibits no tenderness or deformity. RLE edema and hematomas   Neurological: He is alert and oriented to person, place, and time. No cranial nerve deficit. Coordination normal.   Skin: Skin is warm. No rash noted. He is not diaphoretic. No erythema. No pallor. Psychiatric: He has a normal mood and affect. His behavior is normal. Thought content normal.   Nursing note and vitals reviewed. BMP:   Recent Labs     04/16/19  0408      K 4.2      CO2 25   BUN 17   CREATININE 0.9   GLUCOSE 122*   CALCIUM 8.1*     CBC:   Recent Labs     04/16/19  0408 04/17/19  0345   WBC 7.9 7.4   HGB 7.5* 8.8*   HCT 24.5* 28.2*   .4* 107.2*    178     CMP:    Recent Labs     04/16/19  0408      K 4.2      CO2 25   BUN 17   CREATININE 0.9   LABGLOM >60   GLUCOSE 122*   CALCIUM 8.1*       30Day lookback of cultures:    Blood Culture Recent: No results for input(s): BC in the last 720 hours. Gram Stain Recent: No results for input(s): LABGRAM in the last 720 hours. Resp Culture Recent: No results for input(s): CULTRESP in the last 720 hours. Body Fluid Recent : No results for input(s): BFCX in the last 720 hours. MRSA Recent : No results for input(s): [address] in the last 720 hours. Urine Culture Recent : No results for input(s): LABURIN in the last 720 hours. Organism Recent : No results for input(s): ORG in the last 720 hours. Radiology:   Ct Abdomen Pelvis Wo Contrast Additional Contrast? None    Result Date: 3/29/2019  Examination. CT ABDOMEN PELVIS WO CONTRAST History: The patient complains of abdominal pain.  History of retroperitoneal hematoma. DLP: 1742 mGycm. The CT scan of the abdomen and pelvis is performed without oral or intravenous contrast enhancement. The images are acquired in axial plane with subsequent reconstruction in coronal and sagittal planes. There is no previous study for comparison. The lung bases included in the study show scarring and areas of discoid atelectasis in the lower lungs bilaterally. A tiny, 2 mm, subpleural nodule is seen in the right lower lobe laterally, image #6 in axial plane. Severe atheromatous changes of the coronary arteries and the visualized thoracic aorta are noted. The unenhanced liver and spleen appear unremarkable. A markedly distended gallbladder seen no gallstones. Common bile duct is normal. The pancreas is normal. The adrenal glands bilaterally are normal. There is marked lobulation of renal contour bilaterally suggesting chronic renal cortical scarring. There are bilateral peripelvic renal cysts, more pronounced on the right side. No hydronephrosis. The ureters bilaterally are normal. The urinary bladder is moderately well distended. No intrinsic abnormality. There is a previous prostatectomy surgery. The small fat-containing inguinal hernias bilaterally are seen, left larger than the right. Postsurgical changes of the stomach are noted. The duodenum is normal. The small bowel is normal. A normal appendix is seen. Moderate gas and stool are seen in the colon. There is diverticulosis of the distal colon. No evidence for diverticulitis. The atheromatous changes of the abdominal aorta and iliac arteries are seen. No aneurysmal dilatation. An inferior vena cava filter seen in place with distal end opposite mid body of L2 There is no evidence of abdominal or pelvic lymphadenopathy. Chronic degenerative changes of the lumbar spine are seen with evidence of hardware fusion of L5-S1. A dorsal column stimulator electrodes are seen in place. There is a mild dextroscoliosis.  No focal bony abnormality or a bone lesion. There is evidence of upper abdominal midline fat-containing ventral hernia. There is no evidence of retroperitoneal hematoma. No acute abnormality of the abdomen or pelvis. No evidence of retroperitoneal hematoma. A very distended gallbladder without stones. This may be further evaluated with radionuclide hepatobiliary imaging. The appendix is normal. The diverticulosis of the distal colon. No evidence for diverticulitis. Postsurgical changes of the stomach. No focal complication. The above finding are recorded on a digital voice clip in PACS. Signed by Dr Ruth Uriostegui on 3/29/2019 8:14 AM    Xr Lumbar Spine (2-3 Views)    Result Date: 3/20/2019  History: 68-year-old with spinal cord stimulator. Reference: None. FINDINGS: 2 intraoperative fluoroscopic digital store images at the thoracolumbar junction demonstrate dorsal column stimulator leads. Total images 2. Fluoroscopic time not available. Signed by Dr James Frank on 3/20/2019 10:25 PM    Cta Lower Extremity Right W Wo Contrast    Addendum Date: 4/14/2019    Addendum: There are typographic errors in the initial report: Impression should state:    Result Date: 4/14/2019   ORIGINAL REPORT  Exam:   CTA LOWER EXTREMITY RIGHT W WO CONTRAST  Date:  4/14/2019 History:  Male, age  72 years; leg swelling COMPARISON:  None. Findings : Vascular findings: The right common iliac and extra iliac arteries demonstrate atherosclerotic disease, without flow-limiting stenosis. The right common femoral, superficial femoral, the profunda, popliteal arteries demonstrate no flow-limiting stenosis or occlusion. There is appropriate trifurcation of the popliteal artery with mild stenosis. No occlusion identified. Nonvascular findings: There is edema circumferentially involving the right lower extremity from the acetabulum to the foot.  Additionally, there are organized fluid collections, without enhancement identified in the posterior aspect of the right lower extremity, favored to be hematomas. Superiorly there is a fluid collection measuring 14 x 4 x 5.5 cm. More inferiorly and still in the posterior compartment an additional discrete fluid collection measuring 13 x 5.5 x 8.5 cm. An additional adjacent inferior fluid collection measuring 2.2 x 2.9 x 2.1 cm is noted. There is no acute osseous pathology. Impression: 1. No occlusion or high-grade stenosis in the left humerus fixation of the lumbar extremity. 2. 3 discrete hematoma above the calf as described above. Signed by Dr Nora Echeverria on 4/14/2019 9:38 AM ADDENDUM #1  Impression: 1. No occlusion or high-grade stenosis in the  right lower extremity. 2. 3 discrete hematomas above the calf as described above. Signed by Dr Nora Echeverria on 4/14/2019 12:04 PM    Cta Abdominal Aorta W Bilat Runoff W Wo Contrast    Result Date: 3/31/2019  Indication 72year-old with back pain. Technique Spiral imaging was performed through the abdomen, the pelvis, and both lower extremities during uneventful administration of intravenous contrast, with bolus optimized for arterial evaluation. 3-D MIP reformatted images were obtained. Automated exposure control was utilized to limit radiation dose. DLP 3620 mGy-cm Findings ABDOMEN/PELVIS Mild bronchiectasis in the lung bases with minimal atelectasis. Previous median sternotomy. The heart is enlarged. No pericardial effusion. No liver or splenic masses. Gallbladder is distended. Postoperative changes of the stomach. No adrenal mass. No obstructive uropathy. No acute abnormality of the bowel. Evidence of previous small bowel surgery. Colonic diverticulosis. Dorsal column stimulator lead. ABDOMINAL AORTA There is mild atherosclerosis of the abdominal aorta without aneurysm or dissection. Celiac axis and SMA are widely patent. No renal artery stenosis is seen. The inferior mesenteric artery is patent. There is diffuse asymmetric swelling of the entire right leg.  I suspect a hematoma in the posterior compartment right side. I cannot determine if this is an INTRAmuscular hematoma or an INTERmuscular hematoma. RIGHT LOWER EXTREMITY Mild atherosclerosis of the right common iliac artery which is tortuous proximally. No focal stenosis however. Right external iliac artery is widely patent with minimal plaque. Nonstenotic CFA. Mild diffuse calcific plaque of the SFA without stenosis. Popliteal artery is normal. There is trifurcation disease. Mild/moderate stenosis of the tibioperoneal trunk. Severe multifocal stenoses of the anterior tibial artery. Multifocal severe stenoses of the posterior tibial artery. There is difficulty evaluating the distal tibial vessels due to the calcific plaque. I do believe there is three-vessel runoff to the right foot however. LEFT LOWER EXTREMITY Mild atherosclerosis of the common iliac artery which is nonstenotic. Nonstenotic external iliac artery and CFA with mild atherosclerosis. Mild to moderate atherosclerosis of the distal SFA without significant luminal narrowing. Portions of the left popliteal artery is obscured by streak artifact from the left knee arthroplasty. Otherwise no popliteal artery stenosis is suspected. Trifurcation disease. Mild multifocal stenoses of the anterior/posterior tibial arteries and probably artery. There is three-vessel runoff at the ankle. 1. Asymmetric right leg swelling with hematoma in the posterior compartment right thigh. I suspect this may all be related to an acute hamstrings avulsion injury. There is a 2 cm ossific fragment in the mid/distal thigh, possibly the avulsed and retracted fragment. Confirmation with MRI may be obtained. 2. Trifurcation and tibial disease with three-vessel runoff at both ankles.  Signed by Dr Burak Tobin on 3/31/2019 10:10 AM    Ct Femur Right Wo Contrast    Addendum Date: 3/29/2019    Addendum: There is also superficial soft tissue swelling laterally and posteriorly at the level of the mid and distal Study, VL EXTREMITY VENOUS DUPLEX RIGHT. Indications for Study:Pain, right lower extremity and Edema, right lower extremity. Impression   There is evidence of chronic deep vein thrombosis in the right lower  extremity involving the distal femoral vein. Chronic superficial thrombophlebitis is seen in the short saphenous vein  of the right lower extremity(ies). Signature   ----------------------------------------------------------------  Electronically signed by Cornell Christopher MD(Interpreting  physician) on 03/29/2019 07:45 AM  ----------------------------------------------------------------  Velocities are measured in cm/s ; Diameters are measured in mm Right Lower Extremities DVT Study Measurements Right 2D Measurements +------------------------------------+----------+---------------+----------+ ! Location                            ! Visualized! Compressibility! Thrombosis! +------------------------------------+----------+---------------+----------+ ! Sapheno Femoral Junction            ! Yes       ! Yes            ! None      ! +------------------------------------+----------+---------------+----------+ ! Common Femoral                      !Yes       ! Yes            ! None      ! +------------------------------------+----------+---------------+----------+ ! Prox Femoral                        !Yes       ! Yes            ! None      ! +------------------------------------+----------+---------------+----------+ ! Mid Femoral                         !Yes       ! Yes            ! None      ! +------------------------------------+----------+---------------+----------+ ! Dist Femoral                        !Yes       ! No             !Chronic   ! +------------------------------------+----------+---------------+----------+ ! Deep Femoral                        !Yes       ! Yes            ! None      ! +------------------------------------+----------+---------------+----------+ ! Popliteal                           !Yes       ! Yes !None      ! +------------------------------------+----------+---------------+----------+ ! SSV                                 ! Yes       ! No             !Chronic   ! +------------------------------------+----------+---------------+----------+ ! Gastroc                             ! Yes       ! Yes            ! None      ! +------------------------------------+----------+---------------+----------+ ! PTV                                 ! Yes       ! Yes            ! None      ! +------------------------------------+----------+---------------+----------+ ! GSV                                 ! Yes       ! Yes            ! None      ! +------------------------------------+----------+---------------+----------+ ! ATV                                 ! Yes       ! Yes            ! None      ! +------------------------------------+----------+---------------+----------+ ! Peroneal                            !Yes       ! Yes            ! None      ! +------------------------------------+----------+---------------+----------+      Medications  Current Facility-Administered Medications   Medication Dose Route Frequency Provider Last Rate Last Dose    0.9 % sodium chloride infusion 250 mL  250 mL Intravenous Once SANTY Delgado        oxyCODONE-acetaminophen (PERCOCET)  MG per tablet 1 tablet  1 tablet Oral Q4H PRN Lois George MD   1 tablet at 04/18/19 0325    sodium chloride flush 0.9 % injection 10 mL  10 mL Intravenous 2 times per day Dasha Wolfe MD   10 mL at 04/17/19 2154    sodium chloride flush 0.9 % injection 10 mL  10 mL Intravenous PRN Dasha Wolfe MD        ondansetron [facility]) injection 4 mg  4 mg Intravenous Q6H PRN Dasha Wolfe MD        potassium chloride (KLOR-CON M) extended release tablet 40 mEq  40 mEq Oral PRN Dasha Wolfe MD        Or    potassium bicarb-citric acid (EFFER-K) effervescent tablet 40 mEq  40 mEq Oral PRN Dasha Wolfe MD        Or    potassium chloride 10 mEq/100 mL IVPB (Peripheral Line)  10 mEq Intravenous PRN Tom Martin MD        magnesium sulfate 1 g in dextrose 5% 100 mL IVPB  1 g Intravenous PRN Tom Martin MD        polyethylene glycol [facility]) packet 17 g  17 g Oral Daily PRN Tom Martin MD   17 g at 04/14/19 2239    cetirizine (ZYRTEC) tablet 10 mg  10 mg Oral Daily PRN Tom Martin MD        famotidine (PEPCID) tablet 20 mg  20 mg Oral BID Tom Martin MD   20 mg at 04/17/19 2151    FLUoxetine (PROZAC) capsule 60 mg  60 mg Oral Daily Tom Martin MD   60 mg at 04/17/19 0856    metoprolol succinate (TOPROL XL) extended release tablet 25 mg  25 mg Oral Daily Tom Martin MD   25 mg at 04/17/19 0856    nitroGLYCERIN (NITROSTAT) SL tablet 0.4 mg  0.4 mg Sublingual Q5 Min PRN Tom Martin MD        pregabalin (LYRICA) capsule 100 mg  100 mg Oral BID Tom Martin MD   100 mg at 04/17/19 2151    rosuvastatin (CRESTOR) tablet 10 mg  10 mg Oral Nightly Tmo Martin MD   10 mg at 04/17/19 2151    vitamin B-12 (CYANOCOBALAMIN) tablet 1,000 mcg  1,000 mcg Oral Daily Tom Martin MD   1,000 mcg at 04/17/19 1020    calcium-cholecalciferol 500-200 MG-UNIT per tablet 1 tablet  1 tablet Oral Daily Tom Martin MD   1 tablet at 04/17/19 0856    oxyCODONE-acetaminophen (PERCOCET) 5-325 MG per tablet 1 tablet  1 tablet Oral Q4H PRN Rocio Pesa DO   1 tablet at 04/16/19 1613       Allergies  Iodine; Morphine; and Shellfish-derived products      ASSESSMENT/PLAN:  RLE pain/swelling with chronic deep vein thrombosis and superficial thrombophlebitis      - Venous duplex of RLE on 3/28/2019- Chronic deep vein thrombosis involving the distal femoral and chronic superficial thrombophlebitis in short saphous vein  - CT of right femur on 3/29/2019- Severe right hamstring tendinosis with ischiogluteal bursitis. Nonspecific elongated fluid collection in the central right thigh. Intact right femur without fracture or dislocation of the hip or knee joints.   - CTA RLE 4/14/19 there is no evidence of right arterial occlusion or high-grade stenosis Three discrete hematomas above the calf, measuring (1) 14 x 4 x 5.5 cm  (2) 13 x 5.5 x 8.5 cm  (3) 2.2 x 2.9 x 2.1 cm. No occlusion or high-grade stenosis in the  right lower extremity. - evaluated by ortho, Dr. Amaury Allen who felt there were no immediate concerns for surgical intervention, however monitoring closely for compartment syndrome and need for fasciotomy  - followed by vascular surgery, Dr. Andrew Guerrero as well          GEE Gene Homozygous Mutation, chronic anticoagulation with Warfarin (managed by Dr. Gabrielle Ruggiero)   Matry Sosa had a chronic indwelling IVC Deepak filter     Admit coags on 4/14/2019 3:28 AM  -  PT/INR 29.4 / 2.88  -  PTT 99.4     -  Vitamin K 5 mg on 4/14/2019,  per Dr. Andrew Guerrero  -  1 unit FFP on 4/14/2019, per Dr. Andrew Guerrero   -  aPTT 61 o 4/15/2019  -  PT/INR 14.9/1.23      Macrocytic anemia  -  HgB 8.8, .2 on 4/17/2019- s/p 1 unit of pRBCs 4/15/2019 and 1/16/2019. CBC ordered for 4/19/2019      Serology 3/29/19:  ? Ferritin 108.5  ? Iron 87  ? Iron saturation 29%             ? TIBC 296  ? Folate 14.7  ? Vitamin B12 795  ? Reticulocyte count 0.1022  · Stool for occult blood negative on 4/3/2019    -  May need to consider bone marrow aspiration and biopsy in the future due to elevated MCV to R/O MDS.     Dr. Andrew Guerrero recommend on 4/14/2019, discontinuing ALL anticoagulation (plavix, coumadin, and especially lovenox) for at least 1 week, then restarting coumadin and aspirin (no lovenox, no plavix). Anticipate resuming on Coumadin and ASA on 4/21/2019. Ortho assisting and recommends DVT prophylaxis, ICE, and elevation. Weight loss is also encouraged, something the patient can do for himself to reduce risks of further DVT's, and cardiac event prevention. OK to discharge from hematology standpoint, 2 week follow up arranged in clinic.     SANTY Lemons    04/18/19  7:39 AM    I personally saw and examined this patient, performing a face-to-face diagnostic evaluation with plan of care reviewed and developed with Mariya Parekh and nursing staff. I have addended and/or modified the above history of present illness, physical examination, and assessment and plan to reflect my findings and impressions. Essential elements of the care plan were discussed with Blanca BURT . Agree with findings and assessment/plan as documented above. Questions were encouraged, asked and answered to their understanding and satisfaction.     Electronically signed by Jozef Suárez MD on 4/18/19 at 8:43 AM

## 2019-04-18 NOTE — PROGRESS NOTES
RLE is measuring 16\" at the marked area of leg. Will continue to monitor.    Electronically signed by Keny Ramos RN on 4/18/2019 at 11:08 AM

## 2019-04-18 NOTE — PROGRESS NOTES
[id number] [facility]    Patient:  Natan Raya  YOB: 1945  Date of Service: 4/18/2019  MRN: [id number]   Acct: [de-identified]   Primary Care Physician: Luis M Joseph MD  Advance Directive: Full Code  Admit Date: 4/14/2019       Hospital Day: 4    CHIEF COMPLAINT RLE pain    SUBJECTIVE: Mr. Andreas Sanchez has no new complaints today. RLE edema continues to improve. He refuses need for SNF, states home with [facility] is his preferred discharge plan     Review of Systems:   14 point review of systems is negative except as specifically addressed above. Objective:   VITALS:  /70   Pulse 58   Temp 98.2 °F (36.8 °C)   Resp 16   Ht 5' 8\" (1.727 m)   Wt 265 lb (120.2 kg)   SpO2 96%   BMI 40.29 kg/m²   24HR INTAKE/OUTPUT:      Intake/Output Summary (Last 24 hours) at 4/18/2019 0726  Last data filed at 4/18/2019 0326  Gross per 24 hour   Intake 1080 ml   Output 2350 ml   Net -1270 ml     General appearance: alert, appears stated age, cooperative and no distress, laying in bed on his back   Head: Normocephalic, without obvious abnormality, atraumatic  Eyes: conjunctivae/corneas clear. PERRL, EOM's intact. Ears: normal external ears and nose, throat without exudate  Neck: no adenopathy, no carotid bruit, no JVD, supple, symmetrical, trachea midline and thyroid not enlarged, symmetric, no tenderness/mass/nodules  Lungs: clear to auscultation bilaterally, no rales or wheezes or rhonchi  Heart: regular rate and rhythm, S1, S2 normal, no murmur  Abdomen:soft, obese, non-tender; non-distended, normal bowel sounds no masses, no organomegaly  Extremities:RLE edema with continued improvement, tenderness RLE, scattered ecchymotic areas  Skin: Skin color, texture, turgor normal. No rashes or lesions  Lymphatic: No palpable lymph node enlargment  Neurologic: Alert and oriented X 3, generalized weakness and normal tone.  No focal deficits  Psychiatric: Alert and oriented, thought content appropriate, normal insight, mood appropriate    Medications:      0.9 % sodium chloride  250 mL Intravenous Once    sodium chloride flush  10 mL Intravenous 2 times per day    famotidine  20 mg Oral BID    FLUoxetine  60 mg Oral Daily    metoprolol succinate  25 mg Oral Daily    pregabalin  100 mg Oral BID    rosuvastatin  10 mg Oral Nightly    vitamin B-12  1,000 mcg Oral Daily    calcium-cholecalciferol  1 tablet Oral Daily     oxyCODONE-acetaminophen, sodium chloride flush, ondansetron, potassium chloride **OR** potassium alternative oral replacement **OR** potassium chloride, magnesium sulfate, polyethylene glycol, cetirizine, nitroGLYCERIN, oxyCODONE-acetaminophen  DIET CARDIAC;     Lab and other Data:     Recent Labs     04/16/19  0408 04/17/19  0345   WBC 7.9 7.4   HGB 7.5* 8.8*    178     Recent Labs     04/16/19  0408      K 4.2      CO2 25   BUN 17   CREATININE 0.9   GLUCOSE 122*     INR:   Recent Labs     04/16/19  0831 04/17/19  0345 04/18/19  0338   INR 1.54* 1.38* 1.23*      RAD:   Cta Lower Extremity Right W Wo Contrast  Addendum Date: 4/14/2019    Addendum: There are typographic errors in the initial report: Impression should state:    Result Date: 4/14/2019  ORIGINAL REPORT  Exam:   CTA LOWER EXTREMITY RIGHT W WO CONTRAST  Date:  4/14/2019 History:  Male, age  72 years; leg swelling COMPARISON:  None. Findings : Vascular findings: The right common iliac and extra iliac arteries demonstrate atherosclerotic disease, without flow-limiting stenosis. The right common femoral, superficial femoral, the profunda, popliteal arteries demonstrate no flow-limiting stenosis or occlusion. There is appropriate trifurcation of the popliteal artery with mild stenosis. No occlusion identified. Nonvascular findings: There is edema circumferentially involving the right lower extremity from the acetabulum to the foot.  Additionally, there are organized fluid collections, without enhancement identified in the posterior aspect of the right lower extremity, favored to be hematomas. Superiorly there is a fluid collection measuring 14 x 4 x 5.5 cm. More inferiorly and still in the posterior compartment an additional discrete fluid collection measuring 13 x 5.5 x 8.5 cm. An additional adjacent inferior fluid collection measuring 2.2 x 2.9 x 2.1 cm is noted. There is no acute osseous pathology. Impression: 1. No occlusion or high-grade stenosis in the left humerus fixation of the lumbar extremity. 2. 3 discrete hematoma above the calf as described above. Signed by Dr Benny Rogers on 4/14/2019 9:38 AM ADDENDUM #1  Impression: 1. No occlusion or high-grade stenosis in the  right lower extremity. 2. 3 discrete hematomas above the calf as described above. Signed by Dr Benny Rogers on 4/14/2019 12:04 PM    Assessment/Plan   Principal Problem:    Hematoma of leg, unspecified laterality, initial encounter  Active Problems:    Coronary artery disease    Hx of CABG    Chronic anticoagulation    Osteoarthritis of lumbar spine    Obstructive sleep apnea on CPAP    Avulsion of right hamstring muscle    Chronic blood loss anemia    Morbid obesity (HCC)    Paroxysmal atrial fibrillation (HCC)    Anemia  Resolved Problems:    * No resolved hospital problems. *     Requiring less pain medication. Consult HH, continue to increase activity.  Repeat CBC in AM. Continue to increase activity with PT    GI prophylaxis:  Alona Hazel PA-C

## 2019-04-18 NOTE — PLAN OF CARE
Problem: Falls - Risk of:  Goal: Will remain free from falls  Description  Will remain free from falls  Outcome: Ongoing  Goal: Absence of physical injury  Description  Absence of physical injury  Outcome: Ongoing     Problem: Venous Thromboembolism:  Goal: Will show no signs or symptoms of venous thromboembolism  Description  Will show no signs or symptoms of venous thromboembolism  Outcome: Ongoing  Goal: Absence of signs or symptoms of impaired coagulation  Description  Absence of signs or symptoms of impaired coagulation  Outcome: Ongoing     Problem: Pain:  Goal: Pain level will decrease  Description  Pain level will decrease  Outcome: Ongoing  Goal: Control of acute pain  Description  Control of acute pain  Outcome: Ongoing  Goal: Control of chronic pain  Description  Control of chronic pain  Outcome: Ongoing

## 2019-04-19 VITALS
WEIGHT: 265 LBS | HEIGHT: 68 IN | BODY MASS INDEX: 40.16 KG/M2 | RESPIRATION RATE: 18 BRPM | HEART RATE: 64 BPM | SYSTOLIC BLOOD PRESSURE: 127 MMHG | DIASTOLIC BLOOD PRESSURE: 59 MMHG | OXYGEN SATURATION: 93 % | TEMPERATURE: 98.1 F

## 2019-04-19 LAB
HCT VFR BLD CALC: 28.2 % (ref 42–52)
HEMOGLOBIN: 8.8 G/DL (ref 14–18)
INR BLD: 1.31 (ref 0.88–1.18)
MCH RBC QN AUTO: 33.5 PG (ref 27–31)
MCHC RBC AUTO-ENTMCNC: 31.2 G/DL (ref 33–37)
MCV RBC AUTO: 107.2 FL (ref 80–94)
PDW BLD-RTO: 19.9 % (ref 11.5–14.5)
PLATELET # BLD: 192 K/UL (ref 130–400)
PMV BLD AUTO: 9.2 FL (ref 9.4–12.4)
PROTHROMBIN TIME: 15.6 SEC (ref 12–14.6)
RBC # BLD: 2.63 M/UL (ref 4.7–6.1)
WBC # BLD: 6.7 K/UL (ref 4.8–10.8)

## 2019-04-19 PROCEDURE — 36415 COLL VENOUS BLD VENIPUNCTURE: CPT

## 2019-04-19 PROCEDURE — 6370000000 HC RX 637 (ALT 250 FOR IP): Performed by: HOSPITALIST

## 2019-04-19 PROCEDURE — 85610 PROTHROMBIN TIME: CPT

## 2019-04-19 PROCEDURE — 6370000000 HC RX 637 (ALT 250 FOR IP): Performed by: FAMILY MEDICINE

## 2019-04-19 PROCEDURE — 85027 COMPLETE CBC AUTOMATED: CPT

## 2019-04-19 PROCEDURE — 2580000003 HC RX 258: Performed by: HOSPITALIST

## 2019-04-19 PROCEDURE — 99239 HOSP IP/OBS DSCHRG MGMT >30: CPT | Performed by: FAMILY MEDICINE

## 2019-04-19 RX ORDER — OXYCODONE HYDROCHLORIDE AND ACETAMINOPHEN 5; 325 MG/1; MG/1
1 TABLET ORAL EVERY 4 HOURS PRN
Qty: 18 TABLET | Refills: 0 | Status: SHIPPED | OUTPATIENT
Start: 2019-04-19 | End: 2019-04-22

## 2019-04-19 RX ORDER — WARFARIN SODIUM 7.5 MG/1
7.5 TABLET ORAL DAILY
Qty: 30 TABLET | Refills: 0 | Status: SHIPPED | OUTPATIENT
Start: 2019-04-21 | End: 2020-03-16

## 2019-04-19 RX ORDER — ASPIRIN 81 MG/1
81 TABLET ORAL DAILY
Qty: 30 TABLET | Refills: 5 | Status: SHIPPED | OUTPATIENT
Start: 2019-04-21

## 2019-04-19 RX ADMIN — PREGABALIN 100 MG: 50 CAPSULE ORAL at 08:29

## 2019-04-19 RX ADMIN — Medication 10 ML: at 09:56

## 2019-04-19 RX ADMIN — Medication 1 TABLET: at 08:30

## 2019-04-19 RX ADMIN — METOPROLOL SUCCINATE 25 MG: 25 TABLET, EXTENDED RELEASE ORAL at 08:29

## 2019-04-19 RX ADMIN — FLUOXETINE 60 MG: 20 CAPSULE ORAL at 08:34

## 2019-04-19 RX ADMIN — FAMOTIDINE 20 MG: 20 TABLET ORAL at 08:29

## 2019-04-19 RX ADMIN — CYANOCOBALAMIN TAB 500 MCG 1000 MCG: 500 TAB at 08:27

## 2019-04-19 RX ADMIN — OXYCODONE AND ACETAMINOPHEN 1 TABLET: 10; 325 TABLET ORAL at 08:30

## 2019-04-19 ASSESSMENT — ENCOUNTER SYMPTOMS
EYE DISCHARGE: 0
GASTROINTESTINAL NEGATIVE: 1
RESPIRATORY NEGATIVE: 1
VOMITING: 0
EYES NEGATIVE: 1
SHORTNESS OF BREATH: 0
EYE REDNESS: 0
COUGH: 0
SORE THROAT: 0
BLOOD IN STOOL: 0
WHEEZING: 0
ABDOMINAL PAIN: 0
DIARRHEA: 0
CONSTIPATION: 0
BACK PAIN: 0
EYE PAIN: 0
NAUSEA: 0

## 2019-04-19 ASSESSMENT — PAIN SCALES - GENERAL
PAINLEVEL_OUTOF10: 7
PAINLEVEL_OUTOF10: 0
PAINLEVEL_OUTOF10: 0
PAINLEVEL_OUTOF10: 5

## 2019-04-19 NOTE — PROGRESS NOTES
femoral and chronic superficial thrombophlebitis in short saphous vein     CT of right femur on 3/29/2019- Severe right hamstring tendinosis with ischiogluteal bursitis. Nonspecific elongated fluid collection in the central right thigh. Intact right femur without fracture or dislocation of the hip or knee joints.     Medical records Dr. Joseph Mason 4/14/2014:  Dr. Nadiya Claudio 's consultation note from 4/14/2014 documented that Anahi Javed had a strong family history of clotting disorders to include his sister developed a DVT at the age of 12, his son developed DVT at the age of 12 and a daughter developed DVT and PE while on birth control with a low protein S.     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     Anahi Javed is readmitted 4/14/19 for significant RLE swelling. Yesterday, 4/13/2019, he sat out in the yard with his grandson working on flowers. He states that he sat in a folding lawn chair, the typical kind with the bar across the front, somewhat reclining, that puts pressure right above the knee posteriorly, the precise area of his newly developed hematoma. He states that the symptoms of the right lower extremity began yesterday afternoon after being out in the yard in the lawn chair.     - CTA RLE 4/14/19 there is no evidence of right arterial occlusion or high-grade stenosis Three discrete hematomas above the calf, measuring (1) 14 x 4 x 5.5 cm  (2) 13 x 5.5 x 8.5 cm  (3) 2.2 x 2.9 x 2.1 cm. No occlusion or high-grade stenosis in the  right lower extremity. Review of Systems   Constitutional: Positive for fatigue. Negative for chills, diaphoresis and fever. HENT: Negative.   Negative for congestion, ear pain, hearing loss, nosebleeds, sore throat and tinnitus. Eyes: Negative. Negative for pain, discharge and redness. Respiratory: Negative. Negative for cough, shortness of breath and wheezing. Cardiovascular: Negative. Negative for chest pain, palpitations and leg swelling. Gastrointestinal: Negative. Negative for abdominal pain, blood in stool, constipation, diarrhea, nausea and vomiting. Endocrine: Negative for polydipsia. Genitourinary: Negative for dysuria, flank pain, frequency, hematuria and urgency. Musculoskeletal: Negative. Negative for back pain, myalgias and neck pain. RLE edema and hematomas   Skin: Negative. Negative for rash. Neurological: Positive for weakness. Negative for dizziness, tremors, seizures and headaches. Hematological: Does not bruise/bleed easily. Psychiatric/Behavioral: Negative. The patient is not nervous/anxious. Current Pain Assessment  Pain Assessment  Pain Assessment: FLACC  Pain Level: 0  Pain Type: Acute pain  Pain Location: Leg  Pain Orientation: Right  Response to Pain Intervention: Patient Satisfied    OBJECTIVE:  VITALS: /68   Pulse 77   Temp 96.8 °F (36 °C) (Temporal)   Resp 18   Ht 5' 8\" (1.727 m)   Wt 265 lb (120.2 kg)   SpO2 93%   BMI 40.29 kg/m²   I&O:     Intake/Output Summary (Last 24 hours) at 4/19/2019 0644  Last data filed at 4/18/2019 2046  Gross per 24 hour   Intake 370 ml   Output 300 ml   Net 70 ml         Physical Exam   Constitutional: He is oriented to person, place, and time. He appears well-developed and well-nourished. No distress. HENT:   Head: Normocephalic and atraumatic. Mouth/Throat: Oropharynx is clear and moist.   Eyes: Conjunctivae are normal. Right eye exhibits no discharge. Left eye exhibits no discharge. No scleral icterus. Neck: Normal range of motion. Neck supple. No JVD present. No tracheal deviation present. Cardiovascular: Normal rate, regular rhythm, normal heart sounds and intact distal pulses. History: The patient complains of abdominal pain. History of retroperitoneal hematoma. DLP: 1742 mGycm. The CT scan of the abdomen and pelvis is performed without oral or intravenous contrast enhancement. The images are acquired in axial plane with subsequent reconstruction in coronal and sagittal planes. There is no previous study for comparison. The lung bases included in the study show scarring and areas of discoid atelectasis in the lower lungs bilaterally. A tiny, 2 mm, subpleural nodule is seen in the right lower lobe laterally, image #6 in axial plane. Severe atheromatous changes of the coronary arteries and the visualized thoracic aorta are noted. The unenhanced liver and spleen appear unremarkable. A markedly distended gallbladder seen no gallstones. Common bile duct is normal. The pancreas is normal. The adrenal glands bilaterally are normal. There is marked lobulation of renal contour bilaterally suggesting chronic renal cortical scarring. There are bilateral peripelvic renal cysts, more pronounced on the right side. No hydronephrosis. The ureters bilaterally are normal. The urinary bladder is moderately well distended. No intrinsic abnormality. There is a previous prostatectomy surgery. The small fat-containing inguinal hernias bilaterally are seen, left larger than the right. Postsurgical changes of the stomach are noted. The duodenum is normal. The small bowel is normal. A normal appendix is seen. Moderate gas and stool are seen in the colon. There is diverticulosis of the distal colon. No evidence for diverticulitis. The atheromatous changes of the abdominal aorta and iliac arteries are seen. No aneurysmal dilatation. An inferior vena cava filter seen in place with distal end opposite mid body of L2 There is no evidence of abdominal or pelvic lymphadenopathy. Chronic degenerative changes of the lumbar spine are seen with evidence of hardware fusion of L5-S1.  A dorsal column stimulator electrodes are seen in place. There is a mild dextroscoliosis. No focal bony abnormality or a bone lesion. There is evidence of upper abdominal midline fat-containing ventral hernia. There is no evidence of retroperitoneal hematoma. No acute abnormality of the abdomen or pelvis. No evidence of retroperitoneal hematoma. A very distended gallbladder without stones. This may be further evaluated with radionuclide hepatobiliary imaging. The appendix is normal. The diverticulosis of the distal colon. No evidence for diverticulitis. Postsurgical changes of the stomach. No focal complication. The above finding are recorded on a digital voice clip in PACS. Signed by Dr Patience Mcelroy on 3/29/2019 8:14 AM    Xr Lumbar Spine (2-3 Views)    Result Date: 3/20/2019  History: 59-year-old with spinal cord stimulator. Reference: None. FINDINGS: 2 intraoperative fluoroscopic digital store images at the thoracolumbar junction demonstrate dorsal column stimulator leads. Total images 2. Fluoroscopic time not available. Signed by Dr Fortino Gilmore on 3/20/2019 10:25 PM    Cta Lower Extremity Right W Wo Contrast    Addendum Date: 4/14/2019    Addendum: There are typographic errors in the initial report: Impression should state:    Result Date: 4/14/2019   ORIGINAL REPORT  Exam:   CTA LOWER EXTREMITY RIGHT W WO CONTRAST  Date:  4/14/2019 History:  Male, age  72 years; leg swelling COMPARISON:  None. Findings : Vascular findings: The right common iliac and extra iliac arteries demonstrate atherosclerotic disease, without flow-limiting stenosis. The right common femoral, superficial femoral, the profunda, popliteal arteries demonstrate no flow-limiting stenosis or occlusion. There is appropriate trifurcation of the popliteal artery with mild stenosis. No occlusion identified. Nonvascular findings: There is edema circumferentially involving the right lower extremity from the acetabulum to the foot.  Additionally, there are organized fluid collections, without enhancement identified in the posterior aspect of the right lower extremity, favored to be hematomas. Superiorly there is a fluid collection measuring 14 x 4 x 5.5 cm. More inferiorly and still in the posterior compartment an additional discrete fluid collection measuring 13 x 5.5 x 8.5 cm. An additional adjacent inferior fluid collection measuring 2.2 x 2.9 x 2.1 cm is noted. There is no acute osseous pathology. Impression: 1. No occlusion or high-grade stenosis in the left humerus fixation of the lumbar extremity. 2. 3 discrete hematoma above the calf as described above. Signed by Dr Lupillo Umana on 4/14/2019 9:38 AM ADDENDUM #1  Impression: 1. No occlusion or high-grade stenosis in the  right lower extremity. 2. 3 discrete hematomas above the calf as described above. Signed by Dr Lupillo Umana on 4/14/2019 12:04 PM    Cta Abdominal Aorta W Bilat Runoff W Wo Contrast    Result Date: 3/31/2019  Indication 72year-old with back pain. Technique Spiral imaging was performed through the abdomen, the pelvis, and both lower extremities during uneventful administration of intravenous contrast, with bolus optimized for arterial evaluation. 3-D MIP reformatted images were obtained. Automated exposure control was utilized to limit radiation dose. DLP 3620 mGy-cm Findings ABDOMEN/PELVIS Mild bronchiectasis in the lung bases with minimal atelectasis. Previous median sternotomy. The heart is enlarged. No pericardial effusion. No liver or splenic masses. Gallbladder is distended. Postoperative changes of the stomach. No adrenal mass. No obstructive uropathy. No acute abnormality of the bowel. Evidence of previous small bowel surgery. Colonic diverticulosis. Dorsal column stimulator lead. ABDOMINAL AORTA There is mild atherosclerosis of the abdominal aorta without aneurysm or dissection. Celiac axis and SMA are widely patent. No renal artery stenosis is seen. The inferior mesenteric artery is patent.  There is diffuse asymmetric swelling of the entire right leg. I suspect a hematoma in the posterior compartment right side. I cannot determine if this is an INTRAmuscular hematoma or an INTERmuscular hematoma. RIGHT LOWER EXTREMITY Mild atherosclerosis of the right common iliac artery which is tortuous proximally. No focal stenosis however. Right external iliac artery is widely patent with minimal plaque. Nonstenotic CFA. Mild diffuse calcific plaque of the SFA without stenosis. Popliteal artery is normal. There is trifurcation disease. Mild/moderate stenosis of the tibioperoneal trunk. Severe multifocal stenoses of the anterior tibial artery. Multifocal severe stenoses of the posterior tibial artery. There is difficulty evaluating the distal tibial vessels due to the calcific plaque. I do believe there is three-vessel runoff to the right foot however. LEFT LOWER EXTREMITY Mild atherosclerosis of the common iliac artery which is nonstenotic. Nonstenotic external iliac artery and CFA with mild atherosclerosis. Mild to moderate atherosclerosis of the distal SFA without significant luminal narrowing. Portions of the left popliteal artery is obscured by streak artifact from the left knee arthroplasty. Otherwise no popliteal artery stenosis is suspected. Trifurcation disease. Mild multifocal stenoses of the anterior/posterior tibial arteries and probably artery. There is three-vessel runoff at the ankle. 1. Asymmetric right leg swelling with hematoma in the posterior compartment right thigh. I suspect this may all be related to an acute hamstrings avulsion injury. There is a 2 cm ossific fragment in the mid/distal thigh, possibly the avulsed and retracted fragment. Confirmation with MRI may be obtained. 2. Trifurcation and tibial disease with three-vessel runoff at both ankles.  Signed by Dr Melida Rice on 3/31/2019 10:10 AM    Ct Femur Right Wo Contrast    Addendum Date: 3/29/2019    Addendum: There is also superficial soft tissue swelling laterally and posteriorly at the level of the mid and distal femoral shaft, correlate for cellulitis. There is no superficial abscess identified. Signed by Dr Efrain Delatorre on 3/29/2019 8:48 AM    Result Date: 3/29/2019   ORIGINAL REPORT History: 61-year-old with swelling and induration. Reference: None. TECHNIQUE: Unenhanced CT imaging of the right femur. Automated exposure control was utilized to limit radiation dose. DLP 1459 mGy-cm Findings: The right femur is intact. No fracture. No bone lesion or periosteal reaction. No cortical destruction. No right hip fracture or dislocation. Very mild osteoarthritis of the right hip. Right superior and inferior pubic rami are intact. There is severe hamstrings insertional tendinosis with asymmetric soft tissue density at the site of insertion. There is nonspecific fluid collection centrally in the right thigh musculature between the medial abductor muscles and semitendinosis. This collection measures approximately 3.7 cm in transverse dimension by 11 cm in craniocaudal dimension by 1.7 cm in AP dimension. Vascular calcifications. 1. Intact right femur without fracture or dislocation of the hip or knee joints. 2. Nonspecific elongated fluid collection in the central right thigh. Consider muscular tear as cause but this is nonspecific. MRI is recommended. 3. Severe right hamstring tendinosis with ischiogluteal bursitis.  Signed by Dr Efrain Delatorre on 3/29/2019 8:43 AM ADDENDUM #1    Vl Extremity Venous Right    Result Date: 3/29/2019  Vascular Lower Extremities DVT Study Procedure  Demographics   Patient Name    Guadalupe Ram Age                   72   Patient Number  [id number]           Gender                Male   Visit Number    [id number]        Interpreting          Mickey Sorensen MD                                   Physician   Date of Birth   1945       Referring Physician   Jeff Gonsalez   Accession       [id number]        [address] [place] [address] RVT  Number  Procedure Type of Study:   Veins:Lower Extremities DVT Study, VL EXTREMITY VENOUS DUPLEX RIGHT. Indications for Study:Pain, right lower extremity and Edema, right lower extremity. Impression   There is evidence of chronic deep vein thrombosis in the right lower  extremity involving the distal femoral vein. Chronic superficial thrombophlebitis is seen in the short saphenous vein  of the right lower extremity(ies). Signature   ----------------------------------------------------------------  Electronically signed by Chauncey Pitt MD(Interpreting  physician) on 03/29/2019 07:45 AM  ----------------------------------------------------------------  Velocities are measured in cm/s ; Diameters are measured in mm Right Lower Extremities DVT Study Measurements Right 2D Measurements +------------------------------------+----------+---------------+----------+ ! Location                            ! Visualized! Compressibility! Thrombosis! +------------------------------------+----------+---------------+----------+ ! Sapheno Femoral Junction            ! Yes       ! Yes            ! None      ! +------------------------------------+----------+---------------+----------+ ! Common Femoral                      !Yes       ! Yes            ! None      ! +------------------------------------+----------+---------------+----------+ ! Prox Femoral                        !Yes       ! Yes            ! None      ! +------------------------------------+----------+---------------+----------+ ! Mid Femoral                         !Yes       ! Yes            ! None      ! +------------------------------------+----------+---------------+----------+ ! Dist Femoral                        !Yes       ! No             !Chronic   ! +------------------------------------+----------+---------------+----------+ ! Deep Femoral                        !Yes       ! Yes            ! None      ! +------------------------------------+----------+---------------+----------+ ! Popliteal                           !Yes       ! Yes            ! None      ! +------------------------------------+----------+---------------+----------+ ! SSV                                 ! Yes       ! No             !Chronic   ! +------------------------------------+----------+---------------+----------+ ! Gastroc                             ! Yes       ! Yes            ! None      ! +------------------------------------+----------+---------------+----------+ ! PTV                                 ! Yes       ! Yes            ! None      ! +------------------------------------+----------+---------------+----------+ ! GSV                                 ! Yes       ! Yes            ! None      ! +------------------------------------+----------+---------------+----------+ ! ATV                                 ! Yes       ! Yes            ! None      ! +------------------------------------+----------+---------------+----------+ ! Peroneal                            !Yes       ! Yes            ! None      ! +------------------------------------+----------+---------------+----------+      Medications  Current Facility-Administered Medications   Medication Dose Route Frequency Provider Last Rate Last Dose    0.9 % sodium chloride infusion 250 mL  250 mL Intravenous Once SANTY Delgado        oxyCODONE-acetaminophen (PERCOCET)  MG per tablet 1 tablet  1 tablet Oral Q4H PRN Aguilar Seaman MD   1 tablet at 04/18/19 2043    sodium chloride flush 0.9 % injection 10 mL  10 mL Intravenous 2 times per day Tabitha Galeazzi, MD   10 mL at 04/18/19 2043    sodium chloride flush 0.9 % injection 10 mL  10 mL Intravenous PRN Tabitha Galeazzi, MD        ondansetron [facility]) injection 4 mg  4 mg Intravenous Q6H PRN Tabitha Galeazzi, MD        potassium chloride [employer] [facility]) extended release tablet 40 mEq  40 mEq Oral PRN Tabitha Galeazzi, MD        Or    potassium bicarb-citric acid (EFFER-K) effervescent tablet 40 mEq  40 mEq Oral PRN Alicia Overton MD        Or    potassium chloride 10 mEq/100 mL IVPB (Peripheral Line)  10 mEq Intravenous PRN Alicia Overton MD        magnesium sulfate 1 g in dextrose 5% 100 mL IVPB  1 g Intravenous PRN Alicia Overton MD        polyethylene glycol [facility]) packet 17 g  17 g Oral Daily PRN Alicia Overton MD   17 g at 04/14/19 2239    cetirizine (ZYRTEC) tablet 10 mg  10 mg Oral Daily PRN Alicia Overton MD        famotidine (PEPCID) tablet 20 mg  20 mg Oral BID Alicia Overton MD   20 mg at 04/18/19 2043    FLUoxetine (PROZAC) capsule 60 mg  60 mg Oral Daily Alicia Overton MD   60 mg at 04/18/19 0933    metoprolol succinate (TOPROL XL) extended release tablet 25 mg  25 mg Oral Daily Alicia Overton MD   25 mg at 04/18/19 0931    nitroGLYCERIN (NITROSTAT) SL tablet 0.4 mg  0.4 mg Sublingual Q5 Min PRN Alicia Overton MD        pregabalin (LYRICA) capsule 100 mg  100 mg Oral BID Alicia Overton MD   100 mg at 04/18/19 2043    rosuvastatin (CRESTOR) tablet 10 mg  10 mg Oral Nightly Alicia Overton MD   10 mg at 04/18/19 2043    vitamin B-12 (CYANOCOBALAMIN) tablet 1,000 mcg  1,000 mcg Oral Daily Alicia Overton MD   1,000 mcg at 04/18/19 6063    calcium-cholecalciferol 500-200 MG-UNIT per tablet 1 tablet  1 tablet Oral Daily Alicia Overton MD   1 tablet at 04/18/19 0933    oxyCODONE-acetaminophen (PERCOCET) 5-325 MG per tablet 1 tablet  1 tablet Oral Q4H PRN Babatunde Silva DO   1 tablet at 04/16/19 1613       Allergies  Iodine; Morphine; and Shellfish-derived products      ASSESSMENT/PLAN:  RLE pain/swelling with chronic deep vein thrombosis and superficial thrombophlebitis      - Venous duplex of RLE on 3/28/2019- Chronic deep vein thrombosis involving the distal femoral and chronic superficial thrombophlebitis in short saphous vein  - CT of right femur on 3/29/2019- Severe right hamstring tendinosis with ischiogluteal bursitis. Nonspecific elongated fluid collection in the central right thigh. Intact right femur without fracture or dislocation of the hip or knee joints. - CTA RLE 4/14/19 there is no evidence of right arterial occlusion or high-grade stenosis Three discrete hematomas above the calf, measuring (1) 14 x 4 x 5.5 cm  (2) 13 x 5.5 x 8.5 cm  (3) 2.2 x 2.9 x 2.1 cm. No occlusion or high-grade stenosis in the  right lower extremity. - evaluated by ortho, Dr. Christy Davey who felt there were no immediate concerns for surgical intervention, however monitoring closely for compartment syndrome and need for fasciotomy  - followed by vascular surgery, Dr. Dougie Kendrick as well          GEE Gene Homozygous Mutation, chronic anticoagulation with Warfarin (managed by Dr. Alejandro Scott)   Tanmay Lawson had a chronic indwelling IVC [place] filter     Admit coags on 4/14/2019 3:28 AM  -  PT/INR 29.4 / 2.88  -  PTT 99.4     -  Vitamin K 5 mg on 4/14/2019,  per Dr. Dougie Kendrick  -  1 unit FFP on 4/14/2019, per Dr. Dougie Kendrick   -  aPTT 61 o 4/15/2019  -  PT/INR 15.6/1.31      Macrocytic anemia  -  HgB 8.8, .2 on 4/19/2019- s/p 1 unit of pRBCs 4/15/2019 and 4/16/2019. Serology 3/29/19:  ? Ferritin 108.5  ? Iron 87  ? Iron saturation 29%             ? TIBC 296  ? Folate 14.7  ? Vitamin B12 795  ? Reticulocyte count 0.1022  · Stool for occult blood negative on 4/3/2019    -  May need to consider bone marrow aspiration and biopsy in the future due to elevated MCV to R/O MDS.     Dr. Dougie Kendrick recommend on 4/14/2019, discontinuing ALL anticoagulation (plavix, coumadin, and especially lovenox) for at least 1 week, then restarting coumadin and aspirin (no lovenox, no plavix). Anticipate resuming on Coumadin and ASA on 4/21/2019. Ortho assisting and recommends DVT prophylaxis, ICE, and elevation. Weight loss is also encouraged, something the patient can do for himself to reduce risks of further DVT's, and cardiac event prevention.     OK to discharge from hematology standpoint, 2 week follow up arranged in clinic. Jairo Hwang, APRN    04/19/19  6:44 AM    I personally saw and examined this patient, performing a face-to-face diagnostic evaluation with plan of care reviewed and developed with Vero Witt and nursing staff. I have addended and/or modified the above history of present illness, physical examination, and assessment and plan to reflect my findings and impressions. Essential elements of the care plan were discussed with Blanca BURT . Agree with findings and assessment/plan as documented above. Questions were encouraged, asked and answered to their understanding and satisfaction.     Electronically signed by Javier Brown MD on 4/19/19 at 9:07 AM

## 2019-04-19 NOTE — PROGRESS NOTES
04/19/19 1408   Subjective   Subjective I don't want to walk I am going home   Physical Therapy    Electronically signed by Zoraida Wilson PTA on 4/19/2019 at 2:09 PM

## 2019-04-19 NOTE — DISCHARGE SUMMARY
Hospitalist   Discharge Summary    Fadia Chaudhari  :  1945  MRN:  [id number]    Admit date:  2019  Discharge date:  2019    Admitting Physician:  Audra Triplett MD    Advance Directive: Full Code    Consults: hematology/oncology and orthopedic surgery    Primary Care Physician:  Feliberto Cooper MD    Discharge Diagnoses:  Principal Problem:    Hematoma of leg, unspecified laterality, initial encounter  Active Problems:    Coronary artery disease    Hx of CABG    Chronic anticoagulation    Osteoarthritis of lumbar spine    Obstructive sleep apnea on CPAP    Avulsion of right hamstring muscle    Chronic blood loss anemia    Morbid obesity (HCC)    Paroxysmal atrial fibrillation ([facility] [address]    Anemia  Resolved Problems:    * No resolved hospital problems. *  Bleeding in right thigh due to recent hamstring avulsion and anticoagulation as well as local trauma    Significant Diagnostic Studies:   Cta Lower Extremity Right W Wo Contrast    Addendum Date: 2019    Addendum: There are typographic errors in the initial report: Impression should state: Result Date: 2019  Impression: 1. No occlusion or high-grade stenosis in the left humerus fixation of the lumbar extremity. 2. 3 discrete hematoma above the calf as described above. Impression: 1. No occlusion or high-grade stenosis in the  right lower extremity. 2. 3 discrete hematomas above the calf as described above. Signed by Dr Azucena Cox on 2019 12:04 PM    Labs:    CBC:   Recent Labs     19  0345 19  0107   WBC 7.4 6.7   HGB 8.8* 8.8*    192     INR:   Recent Labs     19  0345 19  0338 19  0107   INR 1.38* 1.23* 1.31*     Hospital Course:   72year old male chronically anticoagulated that presents with recurring pain and swelling over right thigh. He had sustained some local trauma and noted worsening pain. He was admitted to medical floor with an hematology and orthopedic surgery consults.  Initial concern was for need to surgically evacuate the hematoma, this was addressed and considered not necessary by ortho. Anticoagulation was discontinued and tolerated. Vascular surgery recommended to restart Asa and Coumadin only on 4/21/19. The patient hematoma and pain are markedly improved and he is eager to return home. We have offered placement but he has refused again. Physical Exam:    Vitals: /68   Pulse 70   Temp 96.8 °F (36 °C) (Temporal)   Resp 18   Ht 5' 8\" (1.727 m)   Wt 265 lb (120.2 kg)   SpO2 93%   BMI 40.29 kg/m²   General appearance: alert, appears stated age and cooperative  Skin: Skin color, texture, turgor normal. No rashes or lesions   HEENT: Head: Normocephalic, no lesions, without obvious abnormality. Neck: no adenopathy, no carotid bruit, no JVD, supple, symmetrical, trachea midline and thyroid not enlarged, symmetric, no tenderness/mass/nodules  Lungs: clear to auscultation bilaterally  Heart: regular rate and rhythm, S1, S2 normal, no murmur, click, rub or gallop  Abdomen: soft, non-tender; bowel sounds normal; no masses,  no organomegaly  Extremities: improved hematoma right thigh  Neurologic: Mental status: Alert, oriented, thought content appropriate    Discharge Medications:       River Schilling   Home Medication Instructions [id number]    Printed on:04/19/19 1983   Medication Information                      aspirin EC 81 MG EC tablet  Take 1 tablet by mouth daily             Calcium Carbonate-Vitamin D (CALCIUM 600+D PO)  Take 1 tablet by mouth every morning              cetirizine (ZYRTEC) 10 MG tablet  Take 10 mg by mouth daily             famotidine (PEPCID) 20 MG tablet  Take 1 tablet by mouth 2 times daily. FLUoxetine (PROZAC) 20 MG capsule  Take 3 capsules by mouth daily.              metoprolol succinate (TOPROL XL) 25 MG extended release tablet  Take 25 mg by mouth daily             Multiple Vitamins-Minerals (MULTIVITAMIN ADULT PO)  Take 1 tablet by mouth daily              nitroGLYCERIN (NITROSTAT) 0.4 MG SL tablet  Place 1 tablet under the tongue every 5 minutes as needed for Chest pain             oxyCODONE-acetaminophen (PERCOCET) 5-325 MG per tablet  Take 1 tablet by mouth every 4 hours as needed for Pain for up to 3 days. pregabalin (LYRICA) 100 MG capsule  Take 1 capsule by mouth 2 times daily for 30 days. rosuvastatin (CRESTOR) 10 MG tablet  Take 1 tablet by mouth daily. vitamin B-12 (CYANOCOBALAMIN) 1000 MCG tablet  Take 1,000 mcg by mouth daily. warfarin (COUMADIN) 7.5 MG tablet  Take 1 tablet by mouth daily             Anticipate resuming on Coumadin and ASA on 4/21/2019. Discharge Instructions: Follow up with Roxie Gaucher, MD in 7 days. Follow up with Hematology in 2 weeks. Take medications as directed. Resume activity as tolerated. Diet: DIET CARDIAC; Disposition: Patient is medically stable and will be discharged home with home health. Time spent on discharge 41 minutes.     Signed:  Isabella Johnson MD  Hospitalist service  4/19/2019  1:56 PM

## 2019-04-25 LAB — INR BLD: 1.2

## 2019-04-26 ENCOUNTER — ANTI-COAG VISIT (OUTPATIENT)
Dept: CARDIOLOGY | Age: 74
End: 2019-04-26

## 2019-04-29 ENCOUNTER — ANTI-COAG VISIT (OUTPATIENT)
Dept: CARDIOLOGY | Age: 74
End: 2019-04-29

## 2019-04-29 LAB — INR BLD: 1.5

## 2019-05-06 ENCOUNTER — ANTI-COAG VISIT (OUTPATIENT)
Dept: CARDIOLOGY | Age: 74
End: 2019-05-06

## 2019-05-06 LAB — INR BLD: 1.9

## 2019-05-20 ENCOUNTER — ANTI-COAG VISIT (OUTPATIENT)
Dept: CARDIOLOGY | Age: 74
End: 2019-05-20

## 2019-05-20 LAB
INR BLD: 0.8
INR BLD: 1.8

## 2019-05-20 NOTE — CONSULTS
Inpatient consult to Vascular Surgery  Consult performed by: Miguelina Wynn MD  Consult ordered by: Jimi Peñaloza MD          See my previous consultation note and progress notes from last hospitalization. Pt. known to me from prior hospitalization for the same problem.     He rebled into the right posterior thigh, likely secondary to lovenox, coumadin, plavix.     I have reviewed the CTA report and images. There is no active arterial blush that I could offer any endovascular coil embolization.     I will defer to orthopedic surgery for their recommendations regarding hematomas and possible need for surgery as I do not have experience with thigh decompression/fasciotomy.     I would strongly recommend discontinuing ALL anticoagulation (plavix, coumadin, and especially lovenox) for at least 1 week. I would then restart coumadin and aspirin (no lovenox, no plavix).     He has a permanent IVC filter placed in [place] some time ago.     I will sign off for now.

## 2019-06-17 LAB — INR BLD: 2.6

## 2019-06-18 ENCOUNTER — ANTI-COAG VISIT (OUTPATIENT)
Dept: CARDIOLOGY | Age: 74
End: 2019-06-18

## 2019-07-16 ENCOUNTER — TELEPHONE (OUTPATIENT)
Dept: CARDIOLOGY | Age: 74
End: 2019-07-16

## 2019-07-17 ENCOUNTER — ANTI-COAG VISIT (OUTPATIENT)
Dept: CARDIOLOGY | Age: 74
End: 2019-07-17

## 2019-07-17 LAB — INR BLD: 2.4

## 2019-08-05 ENCOUNTER — OFFICE VISIT (OUTPATIENT)
Dept: CARDIOLOGY | Age: 74
End: 2019-08-05
Payer: MEDICARE

## 2019-08-05 VITALS
DIASTOLIC BLOOD PRESSURE: 88 MMHG | OXYGEN SATURATION: 96 % | WEIGHT: 269 LBS | BODY MASS INDEX: 40.77 KG/M2 | HEART RATE: 63 BPM | HEIGHT: 68 IN | SYSTOLIC BLOOD PRESSURE: 126 MMHG

## 2019-08-05 DIAGNOSIS — I48.91 ATRIAL FIBRILLATION, UNSPECIFIED TYPE (HCC): Primary | ICD-10-CM

## 2019-08-05 DIAGNOSIS — I25.10 CORONARY ARTERY DISEASE INVOLVING NATIVE CORONARY ARTERY OF NATIVE HEART WITHOUT ANGINA PECTORIS: ICD-10-CM

## 2019-08-05 DIAGNOSIS — I10 ESSENTIAL HYPERTENSION: ICD-10-CM

## 2019-08-05 DIAGNOSIS — E78.5 DYSLIPIDEMIA: ICD-10-CM

## 2019-08-05 PROCEDURE — 99214 OFFICE O/P EST MOD 30 MIN: CPT | Performed by: NURSE PRACTITIONER

## 2019-08-05 PROCEDURE — 93000 ELECTROCARDIOGRAM COMPLETE: CPT | Performed by: NURSE PRACTITIONER

## 2019-08-05 NOTE — PROGRESS NOTES
[facility]  Established Patient Office Visit   [facility] [address] [phone]        OFFICE VISIT:  2019    Mahad De La Cruz - : 1945    Reason For Visit:  Homer Tamayo is a 69 y.o. male who is here for 6 Month Follow-Up (Patient denies any cardiac issues today ); Atrial Fibrillation; and Palpitations    1. Atrial fibrillation, unspecified type ([address]    2. Coronary artery disease involving native coronary artery of native heart without angina pectoris    3. Essential hypertension    4. Dyslipidemia      Patient with a history of hypertension, hyperlipidemia, paroxysmal atrial fib, ANDREW, GERD, DVT/PE, and coronary artery disease/CABG/stenting. He is a patient of Dr. Shanique Menon. Patient presents to clinic today for six-month follow-up. She denies any complaints of chest pain, pressure or tightness. There is no shortness of breath, orthopnea or PND. Patient denies any lightheadedness, dizziness or syncope.         Subjective    Mahad De La Cruz is a 69 y.o. male with the following history as [facility] in [facility]:    Patient Active Problem List    Diagnosis Date Noted    Abnormal stress test 2019     Priority: High    Coronary artery disease 2019     Priority: High    Hx of CABG 2019     Priority: High    Hypertension 2019     Priority: High    H/O heart artery stent 2019     Priority: High    Chronic anticoagulation 2019     Priority: High    Anemia     Paroxysmal atrial fibrillation ([address] 04/15/2019    Hematoma of leg, unspecified laterality, initial encounter 2019    Morbid obesity ([address] 2019    Avulsion of right hamstring muscle     Chronic blood loss anemia 2019    Atrial fibrillation with RVR ([address] 2019    Chronic bilateral low back pain without sciatica 2019    Anxiety and depression 2018    Memory loss 2017    Obstructive sleep apnea on CPAP 2012  Osteoarthritis of lumbar spine 06/15/2012    B12 deficiency 06/15/2012     Current Outpatient Medications   Medication Sig Dispense Refill    warfarin (COUMADIN) 7.5 MG tablet Take 1 tablet by mouth daily 30 tablet 0    aspirin EC 81 MG EC tablet Take 1 tablet by mouth daily 30 tablet 5    cetirizine (ZYRTEC) 10 MG tablet Take 10 mg by mouth daily      nitroGLYCERIN (NITROSTAT) 0.4 MG SL tablet Place 1 tablet under the tongue every 5 minutes as needed for Chest pain 25 tablet 3    metoprolol succinate (TOPROL XL) 25 MG extended release tablet Take 25 mg by mouth daily      Multiple Vitamins-Minerals (MULTIVITAMIN ADULT PO) Take 1 tablet by mouth daily       famotidine (PEPCID) 20 MG tablet Take 1 tablet by mouth 2 times daily. 180 tablet 3    FLUoxetine (PROZAC) 20 MG capsule Take 3 capsules by mouth daily. 270 capsule 3    rosuvastatin (CRESTOR) 10 MG tablet Take 1 tablet by mouth daily. (Patient taking differently: Take 10 mg by mouth nightly ) 90 tablet 3    vitamin B-12 (CYANOCOBALAMIN) 1000 MCG tablet Take 1,000 mcg by mouth daily.  Calcium Carbonate-Vitamin D (CALCIUM 600+D PO) Take 1 tablet by mouth every morning       pregabalin (LYRICA) 100 MG capsule Take 1 capsule by mouth 2 times daily for 30 days. 60 capsule 0     No current facility-administered medications for this visit.       Allergies: Iodine; Morphine; and Shellfish-derived products  Past Medical History:   Diagnosis Date    Anxiety and depression 12/11/2017    B12 deficiency     CAD (coronary artery disease)     sees dr. combs    Cervical radiculopathy     Chronic bilateral low back pain without sciatica 3/20/2019    Diverticular disease     [facility]     Erectile dysfunction     GERD (gastroesophageal reflux disease)     Hyperlipidemia     Hypertension     Morbid [facility] ([facility] [address]     OCD (obsessive compulsive disorder)     ANDREW (obstructive sleep apnea)     CPAP 12    Paroxysmal atrial

## 2019-08-13 LAB — INR BLD: 2.8

## 2019-08-16 ENCOUNTER — ANTI-COAG VISIT (OUTPATIENT)
Dept: CARDIOLOGY | Age: 74
End: 2019-08-16

## 2019-09-11 LAB — INR BLD: 2.2

## 2019-09-12 ENCOUNTER — ANTI-COAG VISIT (OUTPATIENT)
Dept: CARDIOLOGY | Age: 74
End: 2019-09-12

## 2019-10-09 ENCOUNTER — ANTI-COAG VISIT (OUTPATIENT)
Dept: CARDIOLOGY | Age: 74
End: 2019-10-09

## 2019-10-09 LAB — INR BLD: 2.3

## 2019-11-06 ENCOUNTER — TELEPHONE (OUTPATIENT)
Dept: CARDIOLOGY | Age: 74
End: 2019-11-06

## 2019-11-11 ENCOUNTER — OFFICE VISIT (OUTPATIENT)
Dept: CARDIOLOGY | Age: 74
End: 2019-11-11
Payer: MEDICARE

## 2019-11-11 VITALS
DIASTOLIC BLOOD PRESSURE: 80 MMHG | HEART RATE: 53 BPM | BODY MASS INDEX: 41.07 KG/M2 | SYSTOLIC BLOOD PRESSURE: 118 MMHG | WEIGHT: 271 LBS | HEIGHT: 68 IN

## 2019-11-11 DIAGNOSIS — I25.10 CORONARY ARTERY DISEASE INVOLVING NATIVE CORONARY ARTERY OF NATIVE HEART WITHOUT ANGINA PECTORIS: ICD-10-CM

## 2019-11-11 DIAGNOSIS — I48.0 PAF (PAROXYSMAL ATRIAL FIBRILLATION) (HCC): ICD-10-CM

## 2019-11-11 DIAGNOSIS — I49.3 PVC (PREMATURE VENTRICULAR CONTRACTION): Primary | ICD-10-CM

## 2019-11-11 PROCEDURE — 93000 ELECTROCARDIOGRAM COMPLETE: CPT | Performed by: INTERNAL MEDICINE

## 2019-11-11 PROCEDURE — 1036F TOBACCO NON-USER: CPT | Performed by: INTERNAL MEDICINE

## 2019-11-11 PROCEDURE — 1123F ACP DISCUSS/DSCN MKR DOCD: CPT | Performed by: INTERNAL MEDICINE

## 2019-11-11 PROCEDURE — 3017F COLORECTAL CA SCREEN DOC REV: CPT | Performed by: INTERNAL MEDICINE

## 2019-11-11 PROCEDURE — G8484 FLU IMMUNIZE NO ADMIN: HCPCS | Performed by: INTERNAL MEDICINE

## 2019-11-11 PROCEDURE — G8598 ASA/ANTIPLAT THER USED: HCPCS | Performed by: INTERNAL MEDICINE

## 2019-11-11 PROCEDURE — G8427 DOCREV CUR MEDS BY ELIG CLIN: HCPCS | Performed by: INTERNAL MEDICINE

## 2019-11-11 PROCEDURE — 4040F PNEUMOC VAC/ADMIN/RCVD: CPT | Performed by: INTERNAL MEDICINE

## 2019-11-11 PROCEDURE — 99213 OFFICE O/P EST LOW 20 MIN: CPT | Performed by: INTERNAL MEDICINE

## 2019-11-11 PROCEDURE — G8417 CALC BMI ABV UP PARAM F/U: HCPCS | Performed by: INTERNAL MEDICINE

## 2019-11-11 RX ORDER — NITROGLYCERIN 0.4 MG/1
0.4 TABLET SUBLINGUAL EVERY 5 MIN PRN
Qty: 25 TABLET | Refills: 3 | Status: SHIPPED | OUTPATIENT
Start: 2019-11-11

## 2019-11-13 LAB — INR BLD: 2.4

## 2019-11-14 ENCOUNTER — ANTI-COAG VISIT (OUTPATIENT)
Dept: CARDIOLOGY | Age: 74
End: 2019-11-14

## 2019-11-21 ENCOUNTER — ANTI-COAG VISIT (OUTPATIENT)
Dept: CARDIOLOGY | Age: 74
End: 2019-11-21

## 2019-12-13 ENCOUNTER — ANTI-COAG VISIT (OUTPATIENT)
Dept: CARDIOLOGY | Age: 74
End: 2019-12-13

## 2019-12-13 LAB — INR BLD: 2.4

## 2020-01-09 ENCOUNTER — ANTI-COAG VISIT (OUTPATIENT)
Dept: CARDIOLOGY | Age: 75
End: 2020-01-09

## 2020-01-09 LAB — INR BLD: 2.7

## 2020-02-07 ENCOUNTER — HOSPITAL ENCOUNTER (OUTPATIENT)
Dept: INFUSION THERAPY | Age: 75
End: 2020-02-07
Payer: MEDICARE

## 2020-02-13 ENCOUNTER — ANTI-COAG VISIT (OUTPATIENT)
Dept: CARDIOLOGY | Age: 75
End: 2020-02-13

## 2020-02-13 LAB — INR BLD: 2.6

## 2020-03-04 LAB — INR BLD: 2.8

## 2020-03-05 ENCOUNTER — ANTI-COAG VISIT (OUTPATIENT)
Dept: CARDIOLOGY | Age: 75
End: 2020-03-05

## 2020-03-16 RX ORDER — WARFARIN SODIUM 7.5 MG/1
TABLET ORAL
Qty: 90 TABLET | Refills: 1 | Status: SHIPPED | OUTPATIENT
Start: 2020-03-16 | End: 2020-10-12

## 2020-04-08 ENCOUNTER — TELEPHONE (OUTPATIENT)
Dept: CARDIOLOGY | Age: 75
End: 2020-04-08

## 2020-04-08 ENCOUNTER — ANTI-COAG VISIT (OUTPATIENT)
Dept: CARDIOLOGY | Age: 75
End: 2020-04-08

## 2020-04-08 LAB — INR BLD: 2.6

## 2020-05-11 ENCOUNTER — ANTI-COAG VISIT (OUTPATIENT)
Dept: CARDIOLOGY | Age: 75
End: 2020-05-11

## 2020-05-11 LAB — INR BLD: 4.5

## 2020-05-11 RX ORDER — WARFARIN SODIUM 10 MG/1
10 TABLET ORAL DAILY
Qty: 30 TABLET | Refills: 5 | Status: SHIPPED | OUTPATIENT
Start: 2020-05-11

## 2020-05-18 ENCOUNTER — ANTI-COAG VISIT (OUTPATIENT)
Dept: CARDIOLOGY | Age: 75
End: 2020-05-18

## 2020-05-18 LAB — INR BLD: 2.1

## 2020-06-17 ENCOUNTER — ANTI-COAG VISIT (OUTPATIENT)
Dept: CARDIOLOGY | Age: 75
End: 2020-06-17

## 2020-06-17 LAB — INR BLD: 2.7

## 2020-07-15 LAB — INR BLD: 3.7

## 2020-07-16 ENCOUNTER — ANTI-COAG VISIT (OUTPATIENT)
Dept: CARDIOLOGY | Age: 75
End: 2020-07-16

## 2020-07-28 ENCOUNTER — TELEPHONE (OUTPATIENT)
Dept: CARDIOLOGY | Age: 75
End: 2020-07-28

## 2020-07-28 NOTE — TELEPHONE ENCOUNTER
Dr. Popeye Hansen called to speak to me or Dr. Foster Marcos. Dr. Foster Marcos was already gone for the day so I took the call. She had Mr. Miller Mins in to see her and he communicated some ZUÑIGA. She has already ordered a CPAP and he has been compliant with it but still SOB. She is also ordering a PFT to rule out Pulmonary disease but would like him to get evaluated by Dr. Foster Marcos due to his cardiac history. I gave her the open appointment tomorrow at [facility] and she was very thankful.

## 2020-07-29 ENCOUNTER — OFFICE VISIT (OUTPATIENT)
Dept: CARDIOLOGY | Age: 75
End: 2020-07-29
Payer: MEDICARE

## 2020-07-29 VITALS
HEIGHT: 68 IN | WEIGHT: 271 LBS | SYSTOLIC BLOOD PRESSURE: 130 MMHG | HEART RATE: 55 BPM | BODY MASS INDEX: 41.07 KG/M2 | DIASTOLIC BLOOD PRESSURE: 80 MMHG

## 2020-07-29 PROCEDURE — G8427 DOCREV CUR MEDS BY ELIG CLIN: HCPCS | Performed by: INTERNAL MEDICINE

## 2020-07-29 PROCEDURE — 1036F TOBACCO NON-USER: CPT | Performed by: INTERNAL MEDICINE

## 2020-07-29 PROCEDURE — 93000 ELECTROCARDIOGRAM COMPLETE: CPT | Performed by: INTERNAL MEDICINE

## 2020-07-29 PROCEDURE — 4040F PNEUMOC VAC/ADMIN/RCVD: CPT | Performed by: INTERNAL MEDICINE

## 2020-07-29 PROCEDURE — 1123F ACP DISCUSS/DSCN MKR DOCD: CPT | Performed by: INTERNAL MEDICINE

## 2020-07-29 PROCEDURE — 99213 OFFICE O/P EST LOW 20 MIN: CPT | Performed by: INTERNAL MEDICINE

## 2020-07-29 PROCEDURE — G8417 CALC BMI ABV UP PARAM F/U: HCPCS | Performed by: INTERNAL MEDICINE

## 2020-07-29 PROCEDURE — 3017F COLORECTAL CA SCREEN DOC REV: CPT | Performed by: INTERNAL MEDICINE

## 2020-07-29 NOTE — PROGRESS NOTES
77-year-old gentleman with a history of dyslipidemia, hypertension, obstructive sleep apnea, DVT/PE/Coumadin therapy, paroxysmal atrial fibrillation, and coronary disease referred back because of increased fatigue and increased AHI on his sleep assessment. Pertinent history reveals the fact that he had stent placement in 2001 and 2004 with a bypass in 2011 [LIMA to the LAD, sequential saphenous vein to first and second obtuse marginals, and saphenous vein to the PDA branch of the right coronary]. Antecedent to those interventions he complained primarily of abdominal discomfort which has not recurred. His fatigue has been somewhat worse over the last few months. He has a history of DVT and a pulmonary embolus and is maintained on Coumadin while still taking aspirin 81 mg a day. He denies orthopnea PND and any symptoms of overt failure. His blood pressure and his lipids are managed by Dr. Babatunde Cardenas and have been acceptable. On exam he carries 271 pounds in a 5 foot 8 inch frame. Pressure is 130/80 with a pulse of 55. Obese, depressed appearing middle-aged gentleman no acute distress. EOMs full, sclerae and conjunctiva normal. PERRLA. Mask in place. Trachea midline with no neck masses. Assessment of internal jugular veins reveals no elevation of central venous pressure at 45 degrees though neck configuration makes assessment difficult. Carotid pulses normal without delay or bruit. Thyroid normal to palpation. Healed midline sternotomy scar. Chest exam reveals normal respiratory effort, no abnormal breath sounds and normal expiratory phase. No skin lesions seen. PMI normal. S1, S2 normal without murmur or aure or click. Obese abdomen. Normal bowel sounds without palpable mass or bruit. No clubbing or acrocyanosis. No significant lower extremity edema or signs of venous insufficiency. General motor strength appears to be within normal limits. Normal range of motion with normal gait.  Alert, oriented x 3, memory and cognition normal as reflected by history and conversation. EKG reveals sinus bradycardia at 55 with pre-transitional Q waves and poor R wave progression consistent with prior anterior infarction along with some apparent nonspecific ST-T wave abnormalities [technical difficulties with the tracing]. Assessment/plan:  1. Coronary disease -increased AHI and fatigue are soft, possibly unrelated to his coronary disease. Nevertheless it is been 6 years since his coronary perfusion was assessed. Will obtain an echocardiogram and a dobutamine stress echo. 2.  Hypertension -acceptable pressures today, monitored and managed by Dr. Ladarius Hoffmann  3. Dyslipidemia -reportedly well controlled  4. Obstructive sleep apnea -uses CPAP nightly with recent equipment change prompted by abnormal rescreening    Medical records reviewed prior to today's clinic visit including visually reviewing recent diagnostic studies such as ECHOs and angiograms. More than 15 minutes spent face-to-face with patient in evaluating, and carefully explaining problems and the planned approach and the reasons behind the decisions.

## 2020-08-05 ENCOUNTER — HOSPITAL ENCOUNTER (OUTPATIENT)
Dept: NON INVASIVE DIAGNOSTICS | Age: 75
Discharge: HOME | End: 2020-08-05
Payer: MEDICARE

## 2020-08-05 VITALS
DIASTOLIC BLOOD PRESSURE: 62 MMHG | BODY MASS INDEX: 41.05 KG/M2 | HEART RATE: 71 BPM | WEIGHT: 270 LBS | SYSTOLIC BLOOD PRESSURE: 117 MMHG

## 2020-08-05 LAB
LV EF: 50 %
LVEF MODALITY: NORMAL

## 2020-08-05 PROCEDURE — 6370000000 HC RX 637 (ALT 250 FOR IP): Performed by: INTERNAL MEDICINE

## 2020-08-05 PROCEDURE — 2500000003 HC RX 250 WO HCPCS: Performed by: INTERNAL MEDICINE

## 2020-08-05 PROCEDURE — 6360000004 HC RX CONTRAST MEDICATION: Performed by: INTERNAL MEDICINE

## 2020-08-05 PROCEDURE — C8928 TTE W OR W/O FOL W/CON,STRES: HCPCS

## 2020-08-05 PROCEDURE — 93306 TTE W/DOPPLER COMPLETE: CPT

## 2020-08-05 PROCEDURE — 2580000003 HC RX 258: Performed by: INTERNAL MEDICINE

## 2020-08-05 PROCEDURE — 6360000002 HC RX W HCPCS: Performed by: INTERNAL MEDICINE

## 2020-08-05 PROCEDURE — 93017 CV STRESS TEST TRACING ONLY: CPT

## 2020-08-05 RX ORDER — METOPROLOL TARTRATE 5 MG/5ML
5 INJECTION INTRAVENOUS PRN
Status: DISCONTINUED | OUTPATIENT
Start: 2020-08-05 | End: 2020-08-06 | Stop reason: HOSPADM

## 2020-08-05 RX ORDER — DOBUTAMINE HYDROCHLORIDE 200 MG/100ML
10 INJECTION INTRAVENOUS CONTINUOUS PRN
Status: DISCONTINUED | OUTPATIENT
Start: 2020-08-05 | End: 2020-08-06 | Stop reason: HOSPADM

## 2020-08-05 RX ORDER — NITROGLYCERIN 0.4 MG/1
0.4 TABLET SUBLINGUAL EVERY 5 MIN PRN
Status: DISCONTINUED | OUTPATIENT
Start: 2020-08-05 | End: 2020-08-07 | Stop reason: HOSPADM

## 2020-08-05 RX ORDER — SODIUM CHLORIDE 9 MG/ML
INJECTION, SOLUTION INTRAVENOUS
Status: COMPLETED | OUTPATIENT
Start: 2020-08-05 | End: 2020-08-05

## 2020-08-05 RX ADMIN — SODIUM CHLORIDE: 9 INJECTION, SOLUTION INTRAVENOUS at 14:10

## 2020-08-05 RX ADMIN — METOPROLOL TARTRATE 5 MG: 1 INJECTION, SOLUTION INTRAVENOUS at 14:05

## 2020-08-05 RX ADMIN — PERFLUTREN 1.65 MG: 6.52 INJECTION, SUSPENSION INTRAVENOUS at 14:10

## 2020-08-05 RX ADMIN — DOBUTAMINE HYDROCHLORIDE 10 MCG/KG/MIN: 200 INJECTION INTRAVENOUS at 14:10

## 2020-08-05 RX ADMIN — NITROGLYCERIN 0.4 MG: 0.4 TABLET SUBLINGUAL at 14:12

## 2020-08-06 ENCOUNTER — TELEPHONE (OUTPATIENT)
Dept: CARDIOLOGY | Age: 75
End: 2020-08-06

## 2020-08-06 NOTE — TELEPHONE ENCOUNTER
Called and spoke with patient, have LC&G scheduled for 08/24/2020 at 0900 with arrival of 0700. Patient is to be NPO after midnight. Patient instructed to arrive through front entrance of hospital and make immediate left. Patient advised they can have one person with them but they both must wear a mask. Patient advised may take morning medications with sip of water. Also advised patient must have COVID testing completed on 08/20/2020 anywhere from 800-1100 am at [facility]. Advised patient that they will be able to proceed with procedure as long as test results are negative. Patient made aware that if testing is not resulted evening prior to procedure that they may have to be rescheduled and possibly retested. Given instructions on where to go and to self quarantine between testing and procedure. Patient does not have IV dye allergy. Patient verbally understood. Called and spoke to Pato in cath lab on 08/06/2020 and scheduled procedure.

## 2020-08-09 RX ORDER — PREDNISONE 50 MG/1
TABLET ORAL
Qty: 3 TABLET | Refills: 0 | Status: SHIPPED | OUTPATIENT
Start: 2020-08-09

## 2020-08-11 ENCOUNTER — TELEPHONE (OUTPATIENT)
Dept: CARDIOLOGY | Age: 75
End: 2020-08-11

## 2020-08-11 NOTE — TELEPHONE ENCOUNTER
I called the pt back and he wanted to know about stopping coumadin and bridging over to lovenox I let him know that I would get the details when Dr. Raul Sharpe comes in office and helps me get the dosages, pt verbalized understanding. SM

## 2020-08-11 NOTE — TELEPHONE ENCOUNTER
Had a message from after hours to return patient's call regarding a heart cath question he had. Left vm to call me back but Dr. Foster Marcos has a full schedule today so I may not be available until the end of the day.

## 2020-08-13 ENCOUNTER — TELEPHONE (OUTPATIENT)
Dept: CARDIOLOGY | Age: 75
End: 2020-08-13

## 2020-08-13 NOTE — TELEPHONE ENCOUNTER
I spoke with pt and his wife and let them know that I got his lovenox sent in to the pharmacy and I gave them the instructions, they both verbally understood. SM

## 2020-08-20 ENCOUNTER — OFFICE VISIT (OUTPATIENT)
Age: 75
End: 2020-08-20

## 2020-08-20 VITALS — TEMPERATURE: 96.7 F | HEART RATE: 55 BPM | OXYGEN SATURATION: 94 %

## 2020-08-20 DIAGNOSIS — I10 ESSENTIAL HYPERTENSION: ICD-10-CM

## 2020-08-20 LAB
ALBUMIN SERPL-MCNC: 4.1 G/DL (ref 3.5–5.2)
ALP BLD-CCNC: 71 U/L (ref 40–130)
ALT SERPL-CCNC: 24 U/L (ref 5–41)
ANION GAP SERPL CALCULATED.3IONS-SCNC: 12 MMOL/L (ref 7–19)
AST SERPL-CCNC: 26 U/L (ref 5–40)
BILIRUB SERPL-MCNC: 0.4 MG/DL (ref 0.2–1.2)
BUN BLDV-MCNC: 15 MG/DL (ref 8–23)
CALCIUM SERPL-MCNC: 9 MG/DL (ref 8.8–10.2)
CHLORIDE BLD-SCNC: 104 MMOL/L (ref 98–111)
CO2: 26 MMOL/L (ref 22–29)
CREAT SERPL-MCNC: 0.9 MG/DL (ref 0.5–1.2)
GFR AFRICAN AMERICAN: >59
GFR NON-AFRICAN AMERICAN: >60
GLUCOSE BLD-MCNC: 102 MG/DL (ref 74–109)
POTASSIUM SERPL-SCNC: 4.6 MMOL/L (ref 3.5–5)
SODIUM BLD-SCNC: 142 MMOL/L (ref 136–145)
TOTAL PROTEIN: 6.7 G/DL (ref 6.6–8.7)

## 2020-08-21 LAB — SARS-COV-2, NAA: NOT DETECTED

## 2020-08-24 ENCOUNTER — HOSPITAL ENCOUNTER (OUTPATIENT)
Dept: CARDIAC CATH/INVASIVE PROCEDURES | Age: 75
Discharge: HOME | End: 2020-08-24
Attending: INTERNAL MEDICINE | Admitting: INTERNAL MEDICINE
Payer: MEDICARE

## 2020-08-24 VITALS
DIASTOLIC BLOOD PRESSURE: 69 MMHG | HEIGHT: 68 IN | RESPIRATION RATE: 25 BRPM | WEIGHT: 270 LBS | OXYGEN SATURATION: 96 % | SYSTOLIC BLOOD PRESSURE: 134 MMHG | HEART RATE: 74 BPM | BODY MASS INDEX: 40.92 KG/M2 | TEMPERATURE: 97.9 F

## 2020-08-24 LAB
HCT VFR BLD CALC: 39.8 % (ref 42–52)
HEMOGLOBIN: 13.7 G/DL (ref 14–18)
INR BLD: 2.47 (ref 0.88–1.18)
MCH RBC QN AUTO: 33.9 PG (ref 27–31)
MCHC RBC AUTO-ENTMCNC: 34.4 G/DL (ref 33–37)
MCV RBC AUTO: 98.5 FL (ref 80–94)
PDW BLD-RTO: 11.7 % (ref 11.5–14.5)
PLATELET # BLD: 182 K/UL (ref 130–400)
PMV BLD AUTO: 9.1 FL (ref 9.4–12.4)
PROTHROMBIN TIME: 27.3 SEC (ref 12–14.6)
RBC # BLD: 4.04 M/UL (ref 4.7–6.1)
WBC # BLD: 9.8 K/UL (ref 4.8–10.8)

## 2020-08-24 PROCEDURE — 85610 PROTHROMBIN TIME: CPT

## 2020-08-24 PROCEDURE — C1887 CATHETER, GUIDING: HCPCS

## 2020-08-24 PROCEDURE — 99152 MOD SED SAME PHYS/QHP 5/>YRS: CPT | Performed by: INTERNAL MEDICINE

## 2020-08-24 PROCEDURE — C1760 CLOSURE DEV, VASC: HCPCS

## 2020-08-24 PROCEDURE — 6360000004 HC RX CONTRAST MEDICATION: Performed by: INTERNAL MEDICINE

## 2020-08-24 PROCEDURE — 2709999900 HC NON-CHARGEABLE SUPPLY

## 2020-08-24 PROCEDURE — 99153 MOD SED SAME PHYS/QHP EA: CPT

## 2020-08-24 PROCEDURE — C1769 GUIDE WIRE: HCPCS

## 2020-08-24 PROCEDURE — 36415 COLL VENOUS BLD VENIPUNCTURE: CPT

## 2020-08-24 PROCEDURE — 99152 MOD SED SAME PHYS/QHP 5/>YRS: CPT

## 2020-08-24 PROCEDURE — C1894 INTRO/SHEATH, NON-LASER: HCPCS

## 2020-08-24 PROCEDURE — 93459 L HRT ART/GRFT ANGIO: CPT | Performed by: INTERNAL MEDICINE

## 2020-08-24 PROCEDURE — 2580000003 HC RX 258: Performed by: INTERNAL MEDICINE

## 2020-08-24 PROCEDURE — 6360000002 HC RX W HCPCS

## 2020-08-24 PROCEDURE — 85027 COMPLETE CBC AUTOMATED: CPT

## 2020-08-24 PROCEDURE — 2500000003 HC RX 250 WO HCPCS

## 2020-08-24 PROCEDURE — 93459 L HRT ART/GRFT ANGIO: CPT

## 2020-08-24 RX ORDER — SODIUM CHLORIDE 0.9 % (FLUSH) 0.9 %
10 SYRINGE (ML) INJECTION PRN
Status: DISCONTINUED | OUTPATIENT
Start: 2020-08-24 | End: 2020-08-24 | Stop reason: HOSPADM

## 2020-08-24 RX ORDER — ONDANSETRON 2 MG/ML
4 INJECTION INTRAMUSCULAR; INTRAVENOUS EVERY 6 HOURS PRN
Status: DISCONTINUED | OUTPATIENT
Start: 2020-08-24 | End: 2020-08-24

## 2020-08-24 RX ORDER — SODIUM CHLORIDE 9 MG/ML
INJECTION, SOLUTION INTRAVENOUS CONTINUOUS
Status: DISCONTINUED | OUTPATIENT
Start: 2020-08-24 | End: 2020-08-24 | Stop reason: HOSPADM

## 2020-08-24 RX ORDER — ASPIRIN 325 MG
325 TABLET ORAL ONCE
Status: DISCONTINUED | OUTPATIENT
Start: 2020-08-24 | End: 2020-08-24 | Stop reason: HOSPADM

## 2020-08-24 RX ORDER — ONDANSETRON 2 MG/ML
4 INJECTION INTRAMUSCULAR; INTRAVENOUS EVERY 6 HOURS PRN
Status: DISCONTINUED | OUTPATIENT
Start: 2020-08-24 | End: 2020-08-24 | Stop reason: HOSPADM

## 2020-08-24 RX ORDER — ACETAMINOPHEN 325 MG/1
650 TABLET ORAL EVERY 4 HOURS PRN
Status: DISCONTINUED | OUTPATIENT
Start: 2020-08-24 | End: 2020-08-24 | Stop reason: HOSPADM

## 2020-08-24 RX ORDER — SODIUM CHLORIDE 9 MG/ML
INJECTION, SOLUTION INTRAVENOUS CONTINUOUS
Status: DISCONTINUED | OUTPATIENT
Start: 2020-08-24 | End: 2020-08-24 | Stop reason: SDUPTHER

## 2020-08-24 RX ORDER — NITROGLYCERIN 0.4 MG/1
0.4 TABLET SUBLINGUAL EVERY 5 MIN PRN
Status: DISCONTINUED | OUTPATIENT
Start: 2020-08-24 | End: 2020-08-24 | Stop reason: HOSPADM

## 2020-08-24 RX ORDER — SODIUM CHLORIDE 0.9 % (FLUSH) 0.9 %
10 SYRINGE (ML) INJECTION EVERY 12 HOURS SCHEDULED
Status: DISCONTINUED | OUTPATIENT
Start: 2020-08-24 | End: 2020-08-24 | Stop reason: HOSPADM

## 2020-08-24 RX ORDER — IODIXANOL 320 MG/ML
150 INJECTION, SOLUTION INTRAVASCULAR
Status: COMPLETED | OUTPATIENT
Start: 2020-08-24 | End: 2020-08-24

## 2020-08-24 RX ADMIN — IODIXANOL 125 ML: 320 INJECTION, SOLUTION INTRAVASCULAR at 11:29

## 2020-08-24 RX ADMIN — SODIUM CHLORIDE: 9 INJECTION, SOLUTION INTRAVENOUS at 09:45

## 2020-08-24 ASSESSMENT — ENCOUNTER SYMPTOMS
BACK PAIN: 0
ABDOMINAL PAIN: 0
VOMITING: 0
ABDOMINAL DISTENTION: 0
BLOOD IN STOOL: 0
WHEEZING: 0
SHORTNESS OF BREATH: 0
DIARRHEA: 0
COUGH: 0
CHEST TIGHTNESS: 1

## 2020-08-24 NOTE — PROGRESS NOTES
Patient ambulated without difficulty. Discharge instructions given. Patient and wife voiced understanding.

## 2020-08-24 NOTE — H&P
Patient:  Saud Bee                  1945  MRN: [id number]    PROBLEM LIST:    Patient Active Problem List    Diagnosis Date Noted    Abnormal stress test 01/17/2019     Priority: High    Coronary artery disease 01/17/2019     Priority: High    Hx of CABG 01/17/2019     Priority: High    Hypertension 01/17/2019     Priority: High    H/O heart artery stent 01/17/2019     Priority: High    Chronic anticoagulation 01/17/2019     Priority: High    Anemia      Priority: Low    Paroxysmal atrial fibrillation ([facility] 04/15/2019     Priority: Low    Hematoma of leg, unspecified laterality, initial encounter 04/14/2019     Priority: Low    Morbid obesity ([facility] 04/04/2019     Priority: Low    Avulsion of right hamstring muscle      Priority: Low    Chronic blood loss anemia 03/30/2019     Priority: Low    Atrial fibrillation with RVR ([facility] 03/29/2019     Priority: Low    Chronic bilateral low back pain without sciatica 03/20/2019     Priority: Low    Anxiety and depression 01/19/2018     Priority: Low    Memory loss 12/11/2017     Priority: Low    Obstructive sleep apnea on CPAP 08/30/2012     Priority: Low    Osteoarthritis of lumbar spine 06/15/2012     Priority: Low     Overview Note:     replace inactive diagnosis      B12 deficiency 06/15/2012     Priority: Low       PRESENTATION: Saud Bee is a 70y.o. year old male who presents with hypertension, ANDREW, prior DVT/PE on Coumadin, paroxysmal atrial fibrillation, coronary artery disease with prior PCI 2001 and 2004 with subsequent CABG 2011 with LIMA to LAD, SVG sequential to OM1 and OM 2, SVG to PDA presenting with complaints of progressive exertional fatigue found to have a positive dobutamine stress echo with reproduction of chest pain referred for cardiac catheterization. REVIEW OF SYSTEMS:  Review of Systems   Constitutional: Positive for fatigue. Negative for activity change and diaphoresis.    HENT: Negative for hearing loss, nosebleeds and tinnitus. Eyes: Negative for visual disturbance. Respiratory: Positive for chest tightness. Negative for cough, shortness of breath and wheezing. Cardiovascular: Negative for chest pain, palpitations and leg swelling. Gastrointestinal: Negative for abdominal distention, abdominal pain, blood in stool, diarrhea and vomiting. Endocrine: Negative for cold intolerance, heat intolerance, polydipsia, polyphagia and polyuria. Genitourinary: Negative for difficulty urinating, flank pain and hematuria. Musculoskeletal: Negative for arthralgias, back pain, joint swelling and myalgias. Skin: Negative for pallor and rash. Neurological: Negative for dizziness, seizures, syncope and headaches. Psychiatric/Behavioral: Negative for behavioral problems and dysphoric mood. The patient is not nervous/anxious.         Past Medical History:      Diagnosis Date    Anxiety and depression 12/11/2017    B12 deficiency     CAD (coronary artery disease)     sees dr. combs    Cervical radiculopathy     Chronic bilateral low back pain without sciatica 3/20/2019    Diverticular disease     DVT (deep venous thrombosis) (HCC)     Erectile dysfunction     GERD (gastroesophageal reflux disease)     Hyperlipidemia     Hypertension     Morbid obesity ([address]     OCD (obsessive compulsive disorder)     ANDREW (obstructive sleep apnea)     CPAP 12    Paroxysmal atrial fibrillation ([address]     Unstable angina pectoris ([address]        Past Surgical History:      Procedure Laterality Date    BACK SURGERY      total x 5 : lower 1983, lower 1997, upper 1999, 11Mut11, [facility]  2001 and 2004    stents    CARPAL TUNNEL RELEASE Left     1985    CATARACT REMOVAL Bilateral 2005    L in Dec, R in Oct    CORONARY ANGIOPLASTY WITH STENT PLACEMENT      12/07/2004,  [address] [place] [address], [id number]    CORONARY ARTERY BYPASS GRAFT  2011    [facility]    DIAGNOSTIC CARDIAC CATH LAB PROCEDURE      GASTRIC rosuvastatin (CRESTOR) 10 MG tablet Take 1 tablet by mouth daily. Patient taking differently: Take 10 mg by mouth nightly  6/13/13   Tae Gonzalez MD   vitamin B-12 (CYANOCOBALAMIN) 1000 MCG tablet Take 1,000 mcg by mouth daily. Historical Provider, MD   Calcium Carbonate-Vitamin D (CALCIUM 600+D PO) Take 1 tablet by mouth every morning     Historical Provider, MD       Allergies:  Iodine; Morphine; and Shellfish-derived products    Past Social History:  Social History     Socioeconomic History    Marital status:      Spouse name: Mrs. Ana Rosa Garay Number of children: 3    Years of education: Not on file    Highest education level: Not on file   Occupational History    Occupation: retired  for [employer] resource strain: Not on file    Food insecurity     Worry: Not on file     Inability: Not on file   [employer] needs     Medical: Not on file     Non-medical: Not on file   Tobacco Use    Smoking status: Never Smoker    Smokeless tobacco: Never Used   Substance and Sexual Activity    Alcohol use:  Yes     Alcohol/week: 1.0 standard drinks     Types: 1 Cans of beer per week     Frequency: Monthly or less     Comment: has 1 beer a month with a piece of pizza    Drug use: No    Sexual activity: Not on file     Comment: Mrs. Fariba Colón    Physical activity     Days per week: Not on file     Minutes per session: Not on file    Stress: Not on file   Relationships    Social connections     Talks on phone: Not on file     Gets together: Not on file     Attends Voodoo service: Not on file     Active member of club or organization: Not on file     Attends meetings of clubs or organizations: Not on file     Relationship status: Not on file    Intimate partner violence     Fear of current or ex partner: Not on file     Emotionally abused: Not on file     Physically abused: Not on file     Forced sexual activity: Not on file   Other Topics Concern    Not on file   Social History Narrative    CODE STATUS: Full Code    HEALTH CARE PROXY: Mrs. 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 Family History:       Problem Relation Age of Onset    Cancer Mother         of the lymph nodes in her neck    Cancer Father         lung cancer    Other Father         4-5 PPD smokes    Obesity Sister     Alcohol Abuse Sister     Other Sister         tobacco abuse    Heart Disease Brother     Other Brother         smoker    Other Brother          in accident at age 48 years    Alcohol Abuse Brother     Liver Disease Brother     Heart Disease Brother     No Known Problems Sister     Other Son         blood disorder - undiagnosed    Kidney Disease Daughter     Other Daughter         blood disorder - Protein S Deficiency    Hemochromatosis Daughter      Physical Exam:    Vitals: There were no vitals taken for this visit. 24HR INTAKE/OUTPUT:  No intake or output data in the 24 hours ending 20 0725    Physical Exam  Constitutional:       Appearance: He is well-developed. He is obese. HENT:      Mouth/Throat:      Pharynx: No oropharyngeal exudate. Eyes:      General: No scleral icterus. Right eye: No discharge. Left eye: No discharge. Neck:      Thyroid: No thyromegaly. Vascular: No JVD.    Cardiovascular:      Rate and Rhythm: Normal rate and regular rhythm. Heart sounds: No murmur. No friction rub. No gallop. Pulmonary:      Effort: No respiratory distress. Breath sounds: No stridor. No wheezing or rales. Abdominal:      General: Bowel sounds are normal. There is no distension. Palpations: Abdomen is soft. There is no mass. Tenderness: There is no abdominal tenderness. There is no guarding or rebound. Musculoskeletal:         General: No deformity. Skin:     General: Skin is warm. Coloration: Skin is not pale. Findings: No erythema or rash. Neurological:      Mental Status: He is alert and oriented to person, place, and time. Motor: No abnormal muscle tone. Coordination: Coordination normal.      Deep Tendon Reflexes: Reflexes normal.         LAB DATA:  CBC: No results for input(s): WBC, HGB, PLT in the last 72 hours. BMP:  No results for input(s): NA, K, CL, CO2, BUN, CREATININE, GLUCOSE in the last 72 hours. Hepatic: No results for input(s): AST, ALT, ALB, BILITOT, ALKPHOS in the last 72 hours. CK, CKMB, Troponin: @LABRCNT (CKTOTAL:3, CKMB:3, TROPONINI:3)@  Pro-BNP: No results for input(s): BNP in the last 72 hours. Lipids: No results for input(s): CHOL, HDL in the last 72 hours. Invalid input(s): LDL  ABGs: No results for input(s): PHART, IJL8AWD, PO2ART, KFK4EJL, BEART, HGBAE, D5PPZFNR, CARBOXHGBART, 02THERAPY in the last 72 hours. INR: No results for input(s): INR in the last 72 hours. A1c:Invalid input(s):  HEMOGLOBIN A1C  URINALYSIS:   Lab Results   Component Value Date    NITRU Negative 02/27/2019    WBCUA 0 02/27/2019    BACTERIA NEGATIVE 02/27/2019    RBCUA 0 02/27/2019    BLOODU SMALL 02/27/2019    SPECGRAV 1.011 02/27/2019    GLUCOSEU Negative 02/27/2019     -----------------------------------------------------------------  IMAGING:  No orders to display      Summary   Dobutamine stress echocardiogram at 88% predicted maximal heart rate with   the development of suspicious chest discomfort but negative EKG response   for ischemia. Suboptimal segmental wall motion images suggest   abnormalities of the mid and basal septum, possibly representing ischemia. All the wall segments were normal in response to target heart rate with   dobutamine. Test was supervised by Dr. Marcela Xiong. Recommendation   Clinical correlation is advised with consideration for a heart   catheterization if symptoms persist.      Signature         Assessment and Recommendations: This is a 70y.o. year old male with past medical history of hypertension, ANDREW, prior DVT/PE on Coumadin, paroxysmal atrial fibrillation, coronary artery disease with prior PCI 2001 and 2004, CABG 2011 with four-vessel grafting with complaints of increasing exertional fatigue with a positive dobutamine stress echo referred for cardiac catheterization. Risks, benefits, alternatives of cardiac catheterization/PCI discussed with the patient and full informed consent obtained.   Acceptable Mallampati score  Consent for moderate conscious sedation  ASA 3             Electronically signed by Ricki Alanis MD on 8/24/2020 at 7:25 AM

## 2020-08-25 ENCOUNTER — ANTI-COAG VISIT (OUTPATIENT)
Dept: CARDIOLOGY | Age: 75
End: 2020-08-25
Payer: MEDICARE

## 2020-08-25 LAB
INTERNATIONAL NORMALIZATION RATIO, POC: 2
PROTHROMBIN TIME, POC: NORMAL

## 2020-08-25 PROCEDURE — 85610 PROTHROMBIN TIME: CPT | Performed by: NURSE PRACTITIONER

## 2020-09-24 ENCOUNTER — ANTI-COAG VISIT (OUTPATIENT)
Dept: CARDIOLOGY | Age: 75
End: 2020-09-24

## 2020-09-24 LAB — INR BLD: 2.8

## 2020-09-28 ENCOUNTER — ANTI-COAG VISIT (OUTPATIENT)
Dept: CARDIOLOGY | Age: 75
End: 2020-09-28

## 2020-10-09 ENCOUNTER — OFFICE VISIT (OUTPATIENT)
Dept: CARDIOLOGY | Age: 75
End: 2020-10-09
Payer: MEDICARE

## 2020-10-09 VITALS
BODY MASS INDEX: 41.83 KG/M2 | HEART RATE: 68 BPM | HEIGHT: 68 IN | WEIGHT: 276 LBS | DIASTOLIC BLOOD PRESSURE: 70 MMHG | SYSTOLIC BLOOD PRESSURE: 138 MMHG

## 2020-10-09 PROCEDURE — 99213 OFFICE O/P EST LOW 20 MIN: CPT | Performed by: INTERNAL MEDICINE

## 2020-10-09 PROCEDURE — 93000 ELECTROCARDIOGRAM COMPLETE: CPT | Performed by: INTERNAL MEDICINE

## 2020-10-09 PROCEDURE — 4040F PNEUMOC VAC/ADMIN/RCVD: CPT | Performed by: INTERNAL MEDICINE

## 2020-10-09 PROCEDURE — G8484 FLU IMMUNIZE NO ADMIN: HCPCS | Performed by: INTERNAL MEDICINE

## 2020-10-09 PROCEDURE — G8427 DOCREV CUR MEDS BY ELIG CLIN: HCPCS | Performed by: INTERNAL MEDICINE

## 2020-10-09 PROCEDURE — 1123F ACP DISCUSS/DSCN MKR DOCD: CPT | Performed by: INTERNAL MEDICINE

## 2020-10-09 PROCEDURE — G8417 CALC BMI ABV UP PARAM F/U: HCPCS | Performed by: INTERNAL MEDICINE

## 2020-10-09 PROCEDURE — 1036F TOBACCO NON-USER: CPT | Performed by: INTERNAL MEDICINE

## 2020-10-09 PROCEDURE — 3017F COLORECTAL CA SCREEN DOC REV: CPT | Performed by: INTERNAL MEDICINE

## 2020-10-09 NOTE — PROGRESS NOTES
63-year-old gentleman with history of dyslipidemia, hypertension, sleep apnea, DVT/PE, paroxysmal atrial fibrillation, and coronary disease returns for follow-up after recent angiographic assessment. Because of worsening fatigue and abnormal noninvasive testing he underwent angiographic restudy on August 24. That revealed normal left ventricular function and critical triple-vessel disease and all grafts patent [LIMA to the LAD, sequential to OM1 and OM 2, and saphenous vein to the PDA branch of the right coronary]. On return today he relates no symptoms that would suggest dysrhythmia, overt LV dysfunction, or recurrent ischemia. He is sedentary despite repeated urging to exercise regularly. On exam carries 276 pounds in a 5 foot frame. Pressure is 138/70 pulse of 68. EOMs full, sclerae and conjunctiva normal. PERRLA. Mask in place. Trachea midline with no neck masses. Assessment of internal jugular veins reveals no elevation of central venous pressure at 45 degrees. Carotid pulses normal without delay or bruit. Thyroid normal to palpation. Healed midline sternotomy scar. Chest exam reveals normal respiratory effort, no abnormal breath sounds and normal expiratory phase. No skin lesions seen. PMI normal. S1, S2 normal without murmur or aure or click. Obese abdomen Normal bowel sounds without palpable mass or bruit. No clubbing or acrocyanosis. Trace pretibial edema. General motor strength appears to be within normal limits. Normal range of motion with normal gait. Alert, oriented x 3, memory and cognition normal as reflected by history and conversation. EKG reveals a sinus rhythm with first-degree AV block [234 ms], prominent voltage, moderate right ventricular conduction delay, inferior Q waves, and poor R-wave progression. Assessment/plan:  1. Coronary disease - recent angiographic assessment shows patency of all grafts  2. Hypertension - marginal control.   Advised that ideally at least systolic pressures run consistently less than 130. Follows up with Dr. Jaun Alba in the near future  3. Dyslipidemia - monitored and managed by Dr. Jaun Alba  4. Pandemic response - appropriate  5. Paroxysmal atrial fibrillation - remains anticoagulated [also needed because of prior DVT/PE]    Medical records reviewed prior to today's clinic visit including visually reviewing recent diagnostic studies such as ECHOs, labs, and angiograms as well as reading previous encounter notes. More than 15 minutes spent face-to-face with patient in evaluating, and carefully explaining problems and the planned approach and the reasons behind the decisions.

## 2020-10-12 RX ORDER — WARFARIN SODIUM 7.5 MG/1
TABLET ORAL
Qty: 90 TABLET | Refills: 3 | Status: SHIPPED | OUTPATIENT
Start: 2020-10-12

## 2020-10-21 ENCOUNTER — ANTI-COAG VISIT (OUTPATIENT)
Dept: CARDIOLOGY | Age: 75
End: 2020-10-21

## 2020-10-21 LAB — INR BLD: 2.9

## 2020-11-14 LAB — INR BLD: 3.6

## 2020-11-16 ENCOUNTER — ANTI-COAG VISIT (OUTPATIENT)
Dept: CARDIOLOGY | Age: 75
End: 2020-11-16

## 2020-12-03 ENCOUNTER — TELEPHONE (OUTPATIENT)
Dept: CARDIOLOGY | Age: 75
End: 2020-12-03

## 2020-12-11 ENCOUNTER — ANTI-COAG VISIT (OUTPATIENT)
Dept: CARDIOLOGY | Age: 75
End: 2020-12-11

## 2020-12-11 LAB — INR BLD: 4.1

## 2020-12-14 ENCOUNTER — ANTI-COAG VISIT (OUTPATIENT)
Dept: CARDIOLOGY | Age: 75
End: 2020-12-14

## 2020-12-14 LAB — INR BLD: 1.8

## 2020-12-28 LAB
INTERNATIONAL NORMALIZATION RATIO, POC: 3.5
PROTHROMBIN TIME, POC: 35.3

## 2020-12-29 ENCOUNTER — ANTI-COAG VISIT (OUTPATIENT)
Dept: CARDIOLOGY | Age: 75
End: 2020-12-29
Payer: MEDICARE

## 2020-12-29 PROCEDURE — 85610 PROTHROMBIN TIME: CPT | Performed by: CLINICAL NURSE SPECIALIST

## 2021-01-04 ENCOUNTER — ANTI-COAG VISIT (OUTPATIENT)
Dept: CARDIOLOGY CLINIC | Age: 76
End: 2021-01-04

## 2021-01-04 LAB — INR BLD: 2.1

## 2021-02-08 ENCOUNTER — ANTI-COAG VISIT (OUTPATIENT)
Dept: CARDIOLOGY CLINIC | Age: 76
End: 2021-02-08

## 2021-02-08 LAB — INR BLD: 2.6

## 2021-02-26 ENCOUNTER — TELEPHONE (OUTPATIENT)
Dept: CARDIOLOGY CLINIC | Age: 76
End: 2021-02-26

## 2021-02-26 NOTE — TELEPHONE ENCOUNTER
Patients wife Tani Lund called stating  has a upcoming appt with Dr. Chika Caba on 4/12. She asked if  could get his appt the same day as her appt 3/30 due to them moving out of [place] at the beginning of April. I looked at schedule there were no openings. Please call back. Thanks.

## 2021-03-08 ENCOUNTER — ANTI-COAG VISIT (OUTPATIENT)
Dept: CARDIOLOGY CLINIC | Age: 76
End: 2021-03-08
Payer: MEDICARE

## 2021-03-08 DIAGNOSIS — I48.0 PAF (PAROXYSMAL ATRIAL FIBRILLATION) (HCC): Primary | ICD-10-CM

## 2021-03-08 LAB
INTERNATIONAL NORMALIZATION RATIO, POC: 2.7
PROTHROMBIN TIME, POC: 26.9

## 2021-03-08 PROCEDURE — 85610 PROTHROMBIN TIME: CPT | Performed by: CLINICAL NURSE SPECIALIST

## 2021-04-06 ENCOUNTER — ANTI-COAG VISIT (OUTPATIENT)
Dept: CARDIOLOGY CLINIC | Age: 76
End: 2021-04-06

## 2021-04-06 LAB — INR BLD: 2.4

## 2021-04-26 ENCOUNTER — TELEPHONE (OUTPATIENT)
Dept: CARDIOLOGY CLINIC | Age: 76
End: 2021-04-26

## (undated) DEVICE — SUTURE PERMA-HAND 0 L18IN NONABSORBABLE BLK MO-7 L22MM 1/2 C041D

## (undated) DEVICE — YANKAUER SUCTION INSTRUMENT WITHOUT CONTROL VENT, OPEN TIP, CLEAR: Brand: YANKAUER

## (undated) DEVICE — COVER US PRB W5XL96IN LTX W/ GEL

## (undated) DEVICE — C-ARMOR C-ARM EQUIPMENT COVERS CLEAR STERILE UNIVERSAL FIT 12 PER CASE: Brand: C-ARMOR

## (undated) DEVICE — DRAPE,LAP,CHOLE,W/TROUGHS,STERILE: Brand: MEDLINE

## (undated) DEVICE — SOLUTION IV 1000ML 0.9% SOD CHL PH 5 INJ USP VIAFLX PLAS

## (undated) DEVICE — SURGICAL SUCTION CONNECTING TUBE WITH MALE CONNECTOR AND SUCTION CLAMP, 2 FT. LONG (.6 M), 5 MM I.D.: Brand: CONMED

## (undated) DEVICE — Device

## (undated) DEVICE — WRENCH SURG L76CM SPNL CRD HEX STIM SYS SURG EQUIP PRECIS

## (undated) DEVICE — GLOVE SURG SZ 75 L12IN FNGR THK79MIL GRN LTX FREE

## (undated) DEVICE — SUTURE MCRYL SZ 4-0 L18IN ABSRB UD L19MM PS-2 3/8 CIR PRIM Y496G

## (undated) DEVICE — NEEDLE SPINAL 22GA L3.5IN SPINOCAN

## (undated) DEVICE — DRAPE,UTILITY,TAPE,15X26,STERILE: Brand: MEDLINE

## (undated) DEVICE — SUTURE VCRL + SZ 2-0 L18IN ABSRB VLT L26MM SH 1/2 CIR VCP775D

## (undated) DEVICE — GLOVE SURG SZ 75 L12IN FNGR THK94MIL TRNSLUC YEL LTX

## (undated) DEVICE — TUNNELER NEUROSTIMULATOR L28CM FOR SPNL CRD STIM SYS

## (undated) DEVICE — REMOTE CONTROL KIT: Brand: FREELINK™

## (undated) DEVICE — DRAPE,UTILITY,XL,4/PK,STERILE: Brand: MEDLINE

## (undated) DEVICE — SUTURE ETHBND EXCEL SZ 0 L18IN NONABSORBABLE GRN L36MM CT-1 CX21D

## (undated) DEVICE — MINOR CDS: Brand: MEDLINE INDUSTRIES, INC.

## (undated) DEVICE — C-ARM: Brand: UNBRANDED

## (undated) DEVICE — NEURO CDS

## (undated) DEVICE — SUTURE ABSORBABLE BRAIDED 0 CT-1 8X27 IN UD VICRYL JJ41G

## (undated) DEVICE — FORCEP BPLR IRIS

## (undated) DEVICE — SUTURE VCRL SZ 2-0 L18IN ABSRB UD CT-1 L36MM 1/2 CIR J839D

## (undated) DEVICE — SYSTEM SKIN CLSR 22CM DERMBND PRINEO

## (undated) DEVICE — NEEDLE SPNL 25GA L3.5IN BLU HUB S STL REG WALL FIT STYL W/